# Patient Record
Sex: FEMALE | Race: BLACK OR AFRICAN AMERICAN | NOT HISPANIC OR LATINO | Employment: FULL TIME | URBAN - METROPOLITAN AREA
[De-identification: names, ages, dates, MRNs, and addresses within clinical notes are randomized per-mention and may not be internally consistent; named-entity substitution may affect disease eponyms.]

---

## 2018-08-13 ENCOUNTER — OFFICE VISIT (OUTPATIENT)
Dept: OBSTETRICS AND GYNECOLOGY | Facility: CLINIC | Age: 33
End: 2018-08-13
Payer: COMMERCIAL

## 2018-08-13 ENCOUNTER — PATIENT MESSAGE (OUTPATIENT)
Dept: OBSTETRICS AND GYNECOLOGY | Facility: CLINIC | Age: 33
End: 2018-08-13

## 2018-08-13 VITALS
SYSTOLIC BLOOD PRESSURE: 114 MMHG | WEIGHT: 149.94 LBS | BODY MASS INDEX: 25.6 KG/M2 | HEIGHT: 64 IN | DIASTOLIC BLOOD PRESSURE: 74 MMHG

## 2018-08-13 DIAGNOSIS — Z01.419 WELL WOMAN EXAM WITH ROUTINE GYNECOLOGICAL EXAM: Primary | ICD-10-CM

## 2018-08-13 DIAGNOSIS — Z30.431 INTRAUTERINE DEVICE SURVEILLANCE: ICD-10-CM

## 2018-08-13 DIAGNOSIS — N91.1 SECONDARY AMENORRHEA: ICD-10-CM

## 2018-08-13 DIAGNOSIS — N64.4 BILATERAL MASTODYNIA: ICD-10-CM

## 2018-08-13 PROCEDURE — 88175 CYTOPATH C/V AUTO FLUID REDO: CPT

## 2018-08-13 PROCEDURE — 99385 PREV VISIT NEW AGE 18-39: CPT | Mod: S$GLB,,, | Performed by: NURSE PRACTITIONER

## 2018-08-13 PROCEDURE — 99999 PR PBB SHADOW E&M-NEW PATIENT-LVL III: CPT | Mod: PBBFAC,,, | Performed by: NURSE PRACTITIONER

## 2018-08-13 NOTE — PROGRESS NOTES
HISTORY OF PRESENT ILLNESS:    Ganesh Schroeder is a 33 y.o. female, [id number]., No LMP recorded. Patient has had an implant.,  presents for a routine exam and c/o monthly breast pain.   -Denies bleeding or cramping with Mirena IUD, but has monthly breast pain.   -Denies lumps, nipple discharge, fever, trauma.   -Reports increased caffeine intake.     PAST HISTORY:  Past Medical History:   Diagnosis Date    Abnormal Pap smear of cervix        Past Surgical History:   Procedure Laterality Date     SECTION      x2    PELVIC LAPAROSCOPY      endometriosis    SALPINGECTOMY Right 2011    ectopic pregnancy    UMBILICAL HERNIA REPAIR         MEDICATIONS AND ALLERGIES:    Current Outpatient Medications:     levonorgestrel (MIRENA) 20 mcg/24 hr (5 years) IUD, 1 each by Intrauterine route once., Disp: , Rfl:     ibuprofen (ADVIL,MOTRIN) 600 MG tablet, Take 1 tablet by mouth Every 6 hours as needed., Disp: , Rfl:     Review of patient's allergies indicates:  No Known Allergies    Family History   Problem Relation Age of Onset    No Known Problems Paternal Grandfather     No Known Problems Paternal Grandmother     No Known Problems Maternal Grandmother     No Known Problems Maternal Grandfather     No Known Problems Father     No Known Problems Mother     No Known Problems Brother     No Known Problems Sister        Social History     Socioeconomic History    Marital status:      Spouse name: Not on file    Number of children: Not on file    Years of education: Not on file    Highest education level: Not on file   Social Needs    Financial resource strain: Not on file    Food insecurity - worry: Not on file    Food insecurity - inability: Not on file    Transportation needs - medical: Not on file    Transportation needs - non-medical: Not on file   Occupational History    Not on file   Tobacco Use    Smoking status: Never Smoker   Substance and Sexual Activity    Alcohol use: No      "Frequency: Never    Drug use: No    Sexual activity: Yes     Partners: Male     Birth control/protection: IUD   Other Topics Concern    Not on file   Social History Narrative    Not on file       OB HISTORY: Number of C/S: 2 Number of ectopic pregnancies:1    COMPREHENSIVE GYN HISTORY:  PAP History: Denies abnormal Paps. LAST PAP: 2010 NORMAL.  Infection History: Denies STDs. Denies PID.  Benign History: Denies uterine fibroids. Denies ovarian cysts. Denies endometriosis. Denies other conditions.  Cancer History: Denies cervical cancer. Denies uterine cancer or hyperplasia. Denies ovarian cancer. Denies vulvar cancer or pre-cancer. Denies vaginal cancer or pre-cancer. Denies breast cancer. Denies colon cancer.  Sexual Activity History: Reports currently being sexually active  Menstrual History: Denies menses.    Dysmenorrhea History: Denies dysmenorrhea.   Contraception: Mirena IUD inserted 2016 per pt "by Dr Veras".      ROS:  GENERAL: No weight changes. No swelling. No fatigue. No fever.  CARDIOVASCULAR: No chest pain. No shortness of breath. No leg cramps.   NEUROLOGICAL: No headaches. No vision changes.  BREASTS: + PAIN. No lumps. No discharge.  ABDOMEN: No pain. No nausea. No vomiting. No diarrhea. No constipation.  REPRODUCTIVE: No abnormal bleeding.   VULVA: No pain. No lesions. No itching.  VAGINA: No relaxation. No itching. No odor. No discharge. No lesions.  URINARY: No incontinence. No nocturia. No frequency. No dysuria.    /74   Ht 5' 4" (1.626 m)   Wt 68 kg (149 lb 14.6 oz)   BMI 25.73 kg/m²     PE:  APPEARANCE: Well nourished, well developed, in no acute distress.  AFFECT: WNL, alert and oriented x 3.  SKIN: No acne or hirsutism.  NECK: Neck symmetric, without masses or thyromegaly.  NODES: No inguinal, cervical, axillary or femoral lymph node enlargement.  CHEST: Good respiratory effort.   ABDOMEN: Soft. No tenderness or masses. UMBILICAL HERNIA has re-occurred. ASYMPTOMATIC.  BREASTS: " Symmetrical, no skin changes, visible lesions, palpable masses or nipple discharge bilaterally.  PELVIC: External female genitalia without lesions.  Female hair distribution. Adequate perineal body, Normal urethral meatus. Vagina moist and well rugated without lesions or discharge.  No significant cystocele or rectocele present. Cervix pink without lesions, discharge or tenderness. IUD STRINGS VISUALIZED AT OS. Uterus is 8 week size, regular, mobile and nontender. Adnexa without masses or tenderness.  EXTREMITIES: No edema    DIAGNOSIS:  1. Well woman exam with routine gynecological exam    2. Secondary amenorrhea due to IUD    3. Intrauterine device surveillance    4. Bilateral mastodynia        PLAN:    Orders Placed This Encounter    Liquid-based pap smear, screening   Declined STD testing    COUNSELING:  The patient was counseled today on:  -IUD danger signs;  -to decrease caffeine intake, wear good support bra, take OTC NSAIDs prn pain;  -A.C.S. Pap and pelvic exam guidelines (pap every 3 years), recomendations for yearly mammogram;  -to follow up with her PCP for other health maintenance.    FOLLOW-UP annually with Dr Veras.

## 2018-08-20 ENCOUNTER — OFFICE VISIT (OUTPATIENT)
Dept: INTERNAL MEDICINE | Facility: CLINIC | Age: 33
End: 2018-08-20
Payer: COMMERCIAL

## 2018-08-20 VITALS
HEIGHT: 64 IN | HEART RATE: 63 BPM | DIASTOLIC BLOOD PRESSURE: 70 MMHG | WEIGHT: 150 LBS | SYSTOLIC BLOOD PRESSURE: 124 MMHG | OXYGEN SATURATION: 97 % | BODY MASS INDEX: 25.61 KG/M2

## 2018-08-20 DIAGNOSIS — K42.9 UMBILICAL HERNIA WITHOUT OBSTRUCTION AND WITHOUT GANGRENE: ICD-10-CM

## 2018-08-20 DIAGNOSIS — Z00.00 HEALTH CARE MAINTENANCE: Primary | ICD-10-CM

## 2018-08-20 PROCEDURE — 99385 PREV VISIT NEW AGE 18-39: CPT | Mod: S$GLB,,, | Performed by: INTERNAL MEDICINE

## 2018-08-20 PROCEDURE — 99999 PR PBB SHADOW E&M-EST. PATIENT-LVL III: CPT | Mod: PBBFAC,,, | Performed by: INTERNAL MEDICINE

## 2018-08-20 NOTE — PROGRESS NOTES
"Subjective:       Patient ID: Ganesh Schroeder is a 33 y.o. female.    Chief Complaint: Establish Care (est care as a new patient.) and Umbilical Hernia (pt states that she had a hernia (mid abdominal/navel area) years ago and thinks that it may have returned. it causes inflammation, certain strain when bending over, growth in size but no SOB.)    HPI   Ganesh Schroeder is a 33 y.o. female here to establish care and have yearly preventative healthcare visit.     She has 2 children. Hadn't been keeping up with her own preventative visits.     She has hx of umbilical hernia which was repaired 5/6/16. Seems like it recurred for last 6 months. Feels strain and pulling jose guadalupe when holding 4 yo.  Reducible. Not painful.    Review of Systems   Constitutional: Negative for activity change and unexpected weight change.   HENT: Negative for hearing loss, rhinorrhea and trouble swallowing.    Eyes: Negative for discharge and visual disturbance.   Respiratory: Negative for chest tightness and wheezing.    Cardiovascular: Negative for chest pain and palpitations.   Gastrointestinal: Negative for blood in stool, constipation, diarrhea and vomiting.   Endocrine: Negative for polydipsia and polyuria.   Genitourinary: Negative for difficulty urinating, dysuria, hematuria and menstrual problem.   Musculoskeletal: Negative for arthralgias, joint swelling and neck pain.   Neurological: Positive for headaches. Negative for weakness.   Psychiatric/Behavioral: Negative for confusion and dysphoric mood.       Objective:   /70 (BP Location: Left arm, Patient Position: Sitting, BP Method: Medium (Manual))   Pulse 63   Ht 5' 4" (1.626 m)   Wt 68 kg (150 lb)   SpO2 97%   BMI 25.75 kg/m²      Physical Exam   Constitutional: She is oriented to person, place, and time. She appears well-developed and well-nourished.   HENT:   Head: Normocephalic and atraumatic.   Eyes: Conjunctivae and EOM are normal. Pupils are equal, round, and reactive to " light.   Neck: Neck supple. No thyromegaly present.   Cardiovascular: Normal rate, regular rhythm and normal heart sounds.   No murmur heard.  Pulmonary/Chest: Effort normal and breath sounds normal. No respiratory distress. She has no wheezes.   Abdominal: Soft. Bowel sounds are normal. She exhibits no distension. There is no tenderness. A hernia is present.   Soft reducible umbilical hernia   Musculoskeletal: Normal range of motion.   Neurological: She is alert and oriented to person, place, and time.   Skin: Skin is warm and dry. No rash noted.   Psychiatric: She has a normal mood and affect. Judgment and thought content normal.   Vitals reviewed.      Assessment:       1. Health care maintenance    2. Umbilical hernia without obstruction and without gangrene        Plan:       Ganesh was seen today for establish care and umbilical hernia.    Diagnoses and all orders for this visit:    Health care maintenance  -     CBC auto differential; Future  -     Hemoglobin A1c; Future  -     Comprehensive metabolic panel; Future  -     Lipid panel; Future  -     TSH; Future  -     Vitamin D; Future    Umbilical hernia without obstruction and without gangrene  Reviewed red flags, if these occur or becomes bothersome will refer to general surgery

## 2018-08-24 ENCOUNTER — LAB VISIT (OUTPATIENT)
Dept: LAB | Facility: HOSPITAL | Age: 33
End: 2018-08-24
Attending: INTERNAL MEDICINE
Payer: COMMERCIAL

## 2018-08-24 DIAGNOSIS — Z00.00 HEALTH CARE MAINTENANCE: ICD-10-CM

## 2018-08-24 LAB
25(OH)D3+25(OH)D2 SERPL-MCNC: 18 NG/ML
ALBUMIN SERPL BCP-MCNC: 3.8 G/DL
ALP SERPL-CCNC: 44 U/L
ALT SERPL W/O P-5'-P-CCNC: 11 U/L
ANION GAP SERPL CALC-SCNC: 6 MMOL/L
AST SERPL-CCNC: 16 U/L
BASOPHILS # BLD AUTO: 0.02 K/UL
BASOPHILS NFR BLD: 0.4 %
BILIRUB SERPL-MCNC: 0.5 MG/DL
BUN SERPL-MCNC: 9 MG/DL
CALCIUM SERPL-MCNC: 9.2 MG/DL
CHLORIDE SERPL-SCNC: 106 MMOL/L
CHOLEST SERPL-MCNC: 177 MG/DL
CHOLEST/HDLC SERPL: 2.7 {RATIO}
CO2 SERPL-SCNC: 25 MMOL/L
CREAT SERPL-MCNC: 1.1 MG/DL
DIFFERENTIAL METHOD: NORMAL
EOSINOPHIL # BLD AUTO: 0.1 K/UL
EOSINOPHIL NFR BLD: 2.8 %
ERYTHROCYTE [DISTWIDTH] IN BLOOD BY AUTOMATED COUNT: 13.1 %
EST. GFR  (AFRICAN AMERICAN): >60 ML/MIN/1.73 M^2
EST. GFR  (NON AFRICAN AMERICAN): >60 ML/MIN/1.73 M^2
ESTIMATED AVG GLUCOSE: 100 MG/DL
GLUCOSE SERPL-MCNC: 88 MG/DL
HBA1C MFR BLD HPLC: 5.1 %
HCT VFR BLD AUTO: 37 %
HDLC SERPL-MCNC: 66 MG/DL
HDLC SERPL: 37.3 %
HGB BLD-MCNC: 12.4 G/DL
LDLC SERPL CALC-MCNC: 98.8 MG/DL
LYMPHOCYTES # BLD AUTO: 1.4 K/UL
LYMPHOCYTES NFR BLD: 29.7 %
MCH RBC QN AUTO: 30.1 PG
MCHC RBC AUTO-ENTMCNC: 33.5 G/DL
MCV RBC AUTO: 90 FL
MONOCYTES # BLD AUTO: 0.4 K/UL
MONOCYTES NFR BLD: 8.1 %
NEUTROPHILS # BLD AUTO: 2.8 K/UL
NEUTROPHILS NFR BLD: 58.8 %
NONHDLC SERPL-MCNC: 111 MG/DL
PLATELET # BLD AUTO: 216 K/UL
PMV BLD AUTO: 9.7 FL
POTASSIUM SERPL-SCNC: 3.9 MMOL/L
PROT SERPL-MCNC: 7.1 G/DL
RBC # BLD AUTO: 4.12 M/UL
SODIUM SERPL-SCNC: 137 MMOL/L
TRIGL SERPL-MCNC: 61 MG/DL
TSH SERPL DL<=0.005 MIU/L-ACNC: 1.76 UIU/ML
WBC # BLD AUTO: 4.71 K/UL

## 2018-08-24 PROCEDURE — 85025 COMPLETE CBC W/AUTO DIFF WBC: CPT

## 2018-08-24 PROCEDURE — 83036 HEMOGLOBIN GLYCOSYLATED A1C: CPT

## 2018-08-24 PROCEDURE — 82306 VITAMIN D 25 HYDROXY: CPT

## 2018-08-24 PROCEDURE — 80061 LIPID PANEL: CPT

## 2018-08-24 PROCEDURE — 36415 COLL VENOUS BLD VENIPUNCTURE: CPT

## 2018-08-24 PROCEDURE — 84443 ASSAY THYROID STIM HORMONE: CPT

## 2018-08-24 PROCEDURE — 80053 COMPREHEN METABOLIC PANEL: CPT

## 2019-01-07 ENCOUNTER — PATIENT MESSAGE (OUTPATIENT)
Dept: INTERNAL MEDICINE | Facility: CLINIC | Age: 34
End: 2019-01-07

## 2019-01-07 DIAGNOSIS — K42.9 UMBILICAL HERNIA WITHOUT OBSTRUCTION AND WITHOUT GANGRENE: Primary | ICD-10-CM

## 2019-01-08 ENCOUNTER — PATIENT MESSAGE (OUTPATIENT)
Dept: INTERNAL MEDICINE | Facility: CLINIC | Age: 34
End: 2019-01-08

## 2019-01-23 ENCOUNTER — OFFICE VISIT (OUTPATIENT)
Dept: SURGERY | Facility: CLINIC | Age: 34
End: 2019-01-23
Payer: COMMERCIAL

## 2019-01-23 VITALS
HEIGHT: 64 IN | DIASTOLIC BLOOD PRESSURE: 90 MMHG | HEART RATE: 75 BPM | WEIGHT: 153 LBS | SYSTOLIC BLOOD PRESSURE: 120 MMHG | BODY MASS INDEX: 26.12 KG/M2

## 2019-01-23 DIAGNOSIS — K43.9 VENTRAL HERNIA WITHOUT OBSTRUCTION OR GANGRENE: Primary | ICD-10-CM

## 2019-01-23 DIAGNOSIS — Z01.818 PRE-OP TESTING: Primary | ICD-10-CM

## 2019-01-23 DIAGNOSIS — K43.2 INCISIONAL HERNIA, WITHOUT OBSTRUCTION OR GANGRENE: ICD-10-CM

## 2019-01-23 DIAGNOSIS — K43.2 RECURRENT VENTRAL INCISIONAL HERNIA: Primary | ICD-10-CM

## 2019-01-23 PROCEDURE — 99203 PR OFFICE/OUTPT VISIT, NEW, LEVL III, 30-44 MIN: ICD-10-PCS | Mod: S$GLB,,, | Performed by: SURGERY

## 2019-01-23 PROCEDURE — 99999 PR PBB SHADOW E&M-EST. PATIENT-LVL III: CPT | Mod: PBBFAC,,, | Performed by: SURGERY

## 2019-01-23 PROCEDURE — 3008F PR BODY MASS INDEX (BMI) DOCUMENTED: ICD-10-PCS | Mod: CPTII,S$GLB,, | Performed by: SURGERY

## 2019-01-23 PROCEDURE — 3008F BODY MASS INDEX DOCD: CPT | Mod: CPTII,S$GLB,, | Performed by: SURGERY

## 2019-01-23 PROCEDURE — 99999 PR PBB SHADOW E&M-EST. PATIENT-LVL III: ICD-10-PCS | Mod: PBBFAC,,, | Performed by: SURGERY

## 2019-01-23 PROCEDURE — 99203 OFFICE O/P NEW LOW 30 MIN: CPT | Mod: S$GLB,,, | Performed by: SURGERY

## 2019-01-23 RX ORDER — TOBRAMYCIN AND DEXAMETHASONE 3; 1 MG/ML; MG/ML
SUSPENSION/ DROPS OPHTHALMIC
Refills: 0 | COMMUNITY
Start: 2018-12-19 | End: 2019-09-09

## 2019-01-23 NOTE — PROGRESS NOTES
History & Physical    SUBJECTIVE:     History of Present Illness:  Patient is a 33 y.o. female who presents with a recurrent incisional ventral hernia. Her surgical history includes diagnostic laparoscopy and fulguration for endometriosis in  and laparoscopic unilateral salpingectomy for an ectopic pregnancy in , as well as  for the birth of her 2 children in  and . She developed a hernia during the pregnancy of her son in  and underwent ventral incisional hernia repair in May 2016; she is unsure of whether mesh was used or not. About a year ago she noticed recurrence however it has not been bothersome until more recently. Typically it is small and spontaneously reducible, however she has had a few episodes of increased pain up to 6-7/10 with a more prominent bulge that needs to be manually reduced. She denies any obstructive or strangulation symptoms.     Chief Complaint   Patient presents with    Hernia     Review of patient's allergies indicates:  No Known Allergies    Current Outpatient Medications   Medication Sig Dispense Refill    levonorgestrel (MIRENA) 20 mcg/24 hr (5 years) IUD 1 each by Intrauterine route once.      tobramycin-dexamethasone 0.3-0.1% (TOBRADEX) 0.3-0.1 % DrpS INT 1 GTT INTO OS QID  0     No current facility-administered medications for this visit.      Past Medical History:   Diagnosis Date    Abnormal Pap smear of cervix      Past Surgical History:   Procedure Laterality Date     SECTION  2010, 7/29/2014    x2    PELVIC LAPAROSCOPY      endometriosis    SALPINGECTOMY Right 2011    ectopic pregnancy    UMBILICAL HERNIA REPAIR  2016     Family History   Problem Relation Age of Onset    No Known Problems Paternal Grandfather     No Known Problems Paternal Grandmother     No Known Problems Maternal Grandmother     No Known Problems Maternal Grandfather     No Known Problems Father     No Known Problems Mother     No Known Problems  "Sister     No Known Problems Child     No Known Problems Child     Heart attack Neg Hx     Stroke Neg Hx     Diabetes Neg Hx     Cancer Neg Hx      Social History     Tobacco Use    Smoking status: Never Smoker    Smokeless tobacco: Never Used   Substance Use Topics    Alcohol use: No     Frequency: Never    Drug use: No      Review of Systems:  Review of Systems   Constitutional: Negative for activity change, appetite change, chills and fatigue.   HENT: Negative for congestion, sinus pain and sore throat.    Eyes: Negative.    Respiratory: Negative for shortness of breath and wheezing.    Cardiovascular: Negative for chest pain and palpitations.   Gastrointestinal: Positive for abdominal pain (at periumbilical hernia site). Negative for blood in stool, diarrhea and vomiting.   Endocrine: Negative.    Genitourinary: Negative.    Musculoskeletal: Negative.    Skin: Negative for rash and wound.   Allergic/Immunologic: Negative for environmental allergies, food allergies and immunocompromised state.   Neurological: Positive for headaches. Negative for dizziness and weakness.   Hematological: Negative.    Psychiatric/Behavioral: Negative.        OBJECTIVE:     Vital Signs (Most Recent)  Pulse: 75 (01/23/19 1326)  BP: (!) 120/90 (01/23/19 1326)  5' 4" (1.626 m)  69.4 kg (152 lb 16 oz)     Physical Exam:  Physical Exam   Constitutional: She is oriented to person, place, and time. She appears well-developed and well-nourished. No distress.   HENT:   Head: Normocephalic and atraumatic.   Eyes: Conjunctivae and EOM are normal. Pupils are equal, round, and reactive to light.   Neck: Normal range of motion. Neck supple.   Cardiovascular: Normal rate and regular rhythm.   Pulmonary/Chest: Effort normal. No respiratory distress.   Abdominal: Soft. She exhibits no mass. There is no tenderness. There is no rebound and no guarding. A hernia (small fat-containing supraumbilical ventral hernia) is present. "   Musculoskeletal: Normal range of motion. She exhibits no deformity.   Neurological: She is alert and oriented to person, place, and time.   Skin: Skin is warm and dry. No rash noted. No erythema.   Psychiatric: She has a normal mood and affect. Her behavior is normal.     Laboratory  CBC: Reviewed  CMP: Reviewed    ASSESSMENT/PLAN:   Mrs. Schroeder is a 33 year-old woman who presents with a small, reducible, minimally symptomatic recurrent ventral incisional hernia just above her umbilicus. Given that she is symptomatic, she would like repair. She has not had any symptoms of obstruction or strangulation. We discussed the risks, benefits and alternatives to hernia repair, the risks including but not limited to bleeding, infection, injury to intraabdominal organs such as bowel or bladder, and chronic pain, as well as recurrence. We reviewed the various approaches including open, laparoscopic or robotic. I would like to get her operative reports from Dr. Duran ([facility]? [facility]?) to see whether mesh was previously used or not. Given the size, if this was repair primarily, then I would approach this open with use of a mesh underlay. If mesh was used and may need to be excised to improve incorporation of new mesh, then a minimally invasive approach would be preferred. Given her post-op restrictions (no lifting, pushing or pulling >10lbs for 1 month post-op) she would like to hold off on surgery until early March. We will review her op note and discuss at that time. It was a pleasure meeting Mrs. Schroeder and thank you for this consultation.    April Burgess  1/23/2019

## 2019-01-23 NOTE — LETTER
January 23, 2019      Faviola Solis MD  [address]  [place] [address]           [facility] General Surgery  [address]  [place] [address]  Phone: [phone]          Patient: Ganesh Schroeder   MR Number: [id number]   YOB: 1985   Date of Visit: 1/23/2019       Dear Dr. Faviola Solis:    Thank you for referring Ganesh Schroeder to me for evaluation. Attached you will find relevant portions of my assessment and plan of care.    If you have questions, please do not hesitate to call me. I look forward to following Ganesh Schroeder along with you.    Sincerely,    April Burgess MD    Enclosure  CC:  No Recipients    If you would like to receive this communication electronically, please contact [email or web address] or [phone] to request more information on [email or web address] Link access.    For providers and/or their staff who would like to refer a patient to [facility], please contact us through our one-stop-shop provider referral line, [facility], at [phone].    If you feel you have received this communication in error or would no longer like to receive these types of communications, please e-mail [email or web address]

## 2019-01-23 NOTE — MEDICAL/APP STUDENT
Patient ID: Ganesh Schroeder is a 33 y.o. female.    Chief Complaint: No chief complaint on file.      HPI:  HPI    Pt has a PMHx of umbilical hernia repair (2016) about a year after birth of second child. Over the past 2 weeks she has been experience episodes of pain 3-4 times a week. Pt rates the intensity at 5 to 6 and describes it has a pressure type pain. The pain is the worst when the hernia obtrudes. She is able to push the hernia back in. Picking up her child is a trigger for the pain. Pertinent negatives include bowel changes, nausea or emesis. Pt erick chills, fever, chest pain, hematochezia, dysuria, and dizziness.     Pt has also been experiencing around 3 headaches a week. She notices they come on if she is the passenger in a car. Alive helps alleviate the pain.       Review of Systems   Constitutional: Negative for chills and fever.   Cardiovascular: Negative for chest pain.   Gastrointestinal: Positive for abdominal pain. Negative for blood in stool, constipation, diarrhea, nausea and vomiting.   Genitourinary: Negative for dysuria and frequency.   Skin: Negative for rash.   Neurological: Positive for headaches. Negative for dizziness.       Current Outpatient Medications   Medication Sig Dispense Refill    levonorgestrel (MIRENA) 20 mcg/24 hr (5 years) IUD 1 each by Intrauterine route once.      tobramycin-dexamethasone 0.3-0.1% (TOBRADEX) 0.3-0.1 % DrpS INT 1 GTT INTO OS QID  0     No current facility-administered medications for this visit.        Review of patient's allergies indicates:  No Known Allergies    Past Medical History:   Diagnosis Date    Abnormal Pap smear of cervix        Past Surgical History:   Procedure Laterality Date     SECTION  2010, 7/29/2014    x2    PELVIC LAPAROSCOPY      endometriosis    SALPINGECTOMY Right 2011    ectopic pregnancy    UMBILICAL HERNIA REPAIR  2016       Family History   Problem Relation Age of Onset    No Known Problems  Paternal Grandfather     No Known Problems Paternal Grandmother     No Known Problems Maternal Grandmother     No Known Problems Maternal Grandfather     No Known Problems Father     No Known Problems Mother     No Known Problems Sister     No Known Problems Child     No Known Problems Child     Heart attack Neg Hx     Stroke Neg Hx     Diabetes Neg Hx     Cancer Neg Hx        Social History     Socioeconomic History    Marital status:      Spouse name: Not on file    Number of children: Not on file    Years of education: Not on file    Highest education level: Not on file   Social Needs    Financial resource strain: Not on file    Food insecurity - worry: Not on file    Food insecurity - inability: Not on file    Transportation needs - medical: Not on file    Transportation needs - non-medical: Not on file   Occupational History    Not on file   Tobacco Use    Smoking status: Never Smoker    Smokeless tobacco: Never Used   Substance and Sexual Activity    Alcohol use: No     Frequency: Never    Drug use: No    Sexual activity: Yes     Partners: Male     Birth control/protection: IUD   Other Topics Concern    Not on file   Social History Narrative    Not on file       Vitals:    01/23/19 1326   BP: (!) 120/90   Pulse: 75       Physical Exam   Constitutional: No distress.   Cardiovascular: Normal heart sounds. Exam reveals no gallop and no friction rub.   No murmur heard.  Pulmonary/Chest: Effort normal and breath sounds normal. No stridor. No respiratory distress. She has no wheezes. She has no rales.   Abdominal: Soft. She exhibits no distension. There is no tenderness. A hernia (Umbilical hernia with small nodule of fat ) is present.       Assessment & Plan:   - obtain op notes from previous umbilical hernia repair to decide which approach to take for the hernia repair

## 2019-01-27 ENCOUNTER — PATIENT MESSAGE (OUTPATIENT)
Dept: ADMINISTRATIVE | Facility: OTHER | Age: 34
End: 2019-01-27

## 2019-02-21 ENCOUNTER — OFFICE VISIT (OUTPATIENT)
Dept: SURGERY | Facility: CLINIC | Age: 34
End: 2019-02-21
Payer: COMMERCIAL

## 2019-02-21 ENCOUNTER — DOCUMENTATION ONLY (OUTPATIENT)
Dept: CARDIOTHORACIC SURGERY | Facility: CLINIC | Age: 34
End: 2019-02-21

## 2019-02-21 VITALS
DIASTOLIC BLOOD PRESSURE: 72 MMHG | SYSTOLIC BLOOD PRESSURE: 118 MMHG | BODY MASS INDEX: 25.95 KG/M2 | WEIGHT: 152 LBS | HEIGHT: 64 IN | TEMPERATURE: 98 F | HEART RATE: 72 BPM

## 2019-02-21 DIAGNOSIS — K43.2 RECURRENT VENTRAL INCISIONAL HERNIA: Primary | ICD-10-CM

## 2019-02-21 PROCEDURE — 3008F PR BODY MASS INDEX (BMI) DOCUMENTED: ICD-10-PCS | Mod: CPTII,S$GLB,, | Performed by: SURGERY

## 2019-02-21 PROCEDURE — 3008F BODY MASS INDEX DOCD: CPT | Mod: CPTII,S$GLB,, | Performed by: SURGERY

## 2019-02-21 PROCEDURE — 99213 OFFICE O/P EST LOW 20 MIN: CPT | Mod: S$GLB,,, | Performed by: SURGERY

## 2019-02-21 PROCEDURE — 99999 PR PBB SHADOW E&M-EST. PATIENT-LVL III: ICD-10-PCS | Mod: PBBFAC,,, | Performed by: SURGERY

## 2019-02-21 PROCEDURE — 99213 PR OFFICE/OUTPT VISIT, EST, LEVL III, 20-29 MIN: ICD-10-PCS | Mod: S$GLB,,, | Performed by: SURGERY

## 2019-02-21 PROCEDURE — 99999 PR PBB SHADOW E&M-EST. PATIENT-LVL III: CPT | Mod: PBBFAC,,, | Performed by: SURGERY

## 2019-02-21 RX ORDER — PENICILLIN V POTASSIUM 500 MG/1
TABLET, FILM COATED ORAL
Refills: 0 | COMMUNITY
Start: 2019-01-31 | End: 2019-09-09

## 2019-02-21 NOTE — H&P (VIEW-ONLY)
History & Physical    SUBJECTIVE:     Chief Complaint: Recurrent ventral incisional hernia    History of Present Illness:  Ganesh Schroeder is a 33 y.o. female who presents with a recurrent incisional ventral hernia. Her surgical history includes diagnostic laparoscopy and fulguration for endometriosis in  and laparoscopic unilateral salpingectomy for an ectopic pregnancy in , as well as  for the birth of her 2 children in  and . She developed a hernia during the pregnancy of her son in  and underwent ventral incisional hernia repair in May 2016; she is unsure of whether mesh was used or not. About a year ago she noticed recurrence however it has not been bothersome until more recently. Typically it is small and spontaneously reducible, however she has had a few episodes of increased pain up to 6-/10 with a more prominent bulge that needs to be manually reduced. She denies any obstructive or strangulation symptoms.     Interval history:  -Outside operative report from [facility] from 16 repair of symptomatic umbilical hernia was uploaded. A 1.5 cm fascial defect was noted and closed with interrupted 0 Ethibond sutures in a Smead-Henao fashion. No mesh was used.  -No change in size of hernia. Patient is able to easily reduce hernia. No fevers, chills, N/V.  -She is interested in surgical repair    Review of patient's allergies indicates:  No Known Allergies    Past Medical History:   Diagnosis Date    Abnormal Pap smear of cervix      Past Surgical History:   Procedure Laterality Date     SECTION  2010, 7/29/2014    x2    PELVIC LAPAROSCOPY      endometriosis    SALPINGECTOMY Right 2011    ectopic pregnancy    UMBILICAL HERNIA REPAIR  2016     Family History   Problem Relation Age of Onset    No Known Problems Paternal Grandfather     No Known Problems Paternal Grandmother     No Known Problems Maternal Grandmother     No Known Problems Maternal Grandfather      "No Known Problems Father     No Known Problems Mother     No Known Problems Sister     No Known Problems Child     No Known Problems Child     Heart attack Neg Hx     Stroke Neg Hx     Diabetes Neg Hx     Cancer Neg Hx      Social History     Tobacco Use    Smoking status: Never Smoker    Smokeless tobacco: Never Used   Substance Use Topics    Alcohol use: No     Frequency: Never    Drug use: No        Review of Systems:  Review of Systems   Constitutional: Negative for chills and fever.   HENT: Negative.    Eyes: Negative.    Respiratory: Negative for chest tightness and shortness of breath.    Cardiovascular: Negative for chest pain and palpitations.   Gastrointestinal: Negative for abdominal pain, constipation, diarrhea, nausea and vomiting.   Endocrine: Negative.    Genitourinary: Negative for difficulty urinating and dysuria.   Musculoskeletal: Negative.    Skin: Negative.    Allergic/Immunologic: Negative.    Neurological: Positive for headaches. Negative for dizziness.   Hematological: Negative.    Psychiatric/Behavioral: Negative for agitation and confusion.       OBJECTIVE:     Vital Signs (Most Recent):  Temp: 98 °F (36.7 °C) (02/21/19 0928)  Pulse: 72 (02/21/19 0928)  BP: 118/72 (02/21/19 0928)    Estimated body mass index is 26.09 kg/m² as calculated from the following:    Height as of this encounter: 5' 4" (1.626 m).    Weight as of this encounter: 68.9 kg (152 lb).    Physical Exam:  Physical Exam   Constitutional: She is oriented to person, place, and time. She appears well-developed and well-nourished. No distress.   Pulmonary/Chest: Effort normal. No respiratory distress.   Abdominal: Soft. She exhibits no distension.   ~3x3 cm supraumbilical hernia, one fingerbreadth above navel   Neurological: She is alert and oriented to person, place, and time.   Skin: Skin is warm and dry.   Psychiatric: She has a normal mood and affect. Her behavior is normal. Judgment and thought content normal. "   Vitals reviewed.      Significant Labs:  CBC:  Lab Results   Component Value Date    WBC 4.71 08/24/2018    HGB 12.4 08/24/2018    HCT 37.0 08/24/2018    MCV 90 08/24/2018     08/24/2018       BMP:  Lab Results   Component Value Date     08/24/2018    K 3.9 08/24/2018     08/24/2018    CO2 25 08/24/2018    BUN 9 08/24/2018    CREATININE 1.1 08/24/2018    CALCIUM 9.2 08/24/2018    ANIONGAP 6 (L) 08/24/2018    ESTGFRAFRICA >60 08/24/2018    EGFRNONAA >60 08/24/2018       ASSESSMENT/PLAN:     -Will proceed with robotic ventral incisional hernia repair with mesh    -Consent in chart    BRUNO Smi MD  General Surgery, PGY-1  Pager: [phone]

## 2019-02-21 NOTE — PROGRESS NOTES
History & Physical    SUBJECTIVE:     Chief Complaint: Recurrent ventral incisional hernia    History of Present Illness:  Ganesh Schroeder is a 33 y.o. female who presents with a recurrent incisional ventral hernia. Her surgical history includes diagnostic laparoscopy and fulguration for endometriosis in  and laparoscopic unilateral salpingectomy for an ectopic pregnancy in , as well as  for the birth of her 2 children in  and . She developed a hernia during the pregnancy of her son in  and underwent ventral incisional hernia repair in May 2016; she is unsure of whether mesh was used or not. About a year ago she noticed recurrence however it has not been bothersome until more recently. Typically it is small and spontaneously reducible, however she has had a few episodes of increased pain up to 6-/10 with a more prominent bulge that needs to be manually reduced. She denies any obstructive or strangulation symptoms.     Interval history:  -Outside operative report from [facility] from 16 repair of symptomatic umbilical hernia was uploaded. A 1.5 cm fascial defect was noted and closed with interrupted 0 Ethibond sutures in a Smead-Henao fashion. No mesh was used.  -No change in size of hernia. Patient is able to easily reduce hernia. No fevers, chills, N/V.  -She is interested in surgical repair    Review of patient's allergies indicates:  No Known Allergies    Past Medical History:   Diagnosis Date    Abnormal Pap smear of cervix      Past Surgical History:   Procedure Laterality Date     SECTION  2010, 7/29/2014    x2    PELVIC LAPAROSCOPY      endometriosis    SALPINGECTOMY Right 2011    ectopic pregnancy    UMBILICAL HERNIA REPAIR  2016     Family History   Problem Relation Age of Onset    No Known Problems Paternal Grandfather     No Known Problems Paternal Grandmother     No Known Problems Maternal Grandmother     No Known Problems Maternal Grandfather      "No Known Problems Father     No Known Problems Mother     No Known Problems Sister     No Known Problems Child     No Known Problems Child     Heart attack Neg Hx     Stroke Neg Hx     Diabetes Neg Hx     Cancer Neg Hx      Social History     Tobacco Use    Smoking status: Never Smoker    Smokeless tobacco: Never Used   Substance Use Topics    Alcohol use: No     Frequency: Never    Drug use: No        Review of Systems:  Review of Systems   Constitutional: Negative for chills and fever.   HENT: Negative.    Eyes: Negative.    Respiratory: Negative for chest tightness and shortness of breath.    Cardiovascular: Negative for chest pain and palpitations.   Gastrointestinal: Negative for abdominal pain, constipation, diarrhea, nausea and vomiting.   Endocrine: Negative.    Genitourinary: Negative for difficulty urinating and dysuria.   Musculoskeletal: Negative.    Skin: Negative.    Allergic/Immunologic: Negative.    Neurological: Positive for headaches. Negative for dizziness.   Hematological: Negative.    Psychiatric/Behavioral: Negative for agitation and confusion.       OBJECTIVE:     Vital Signs (Most Recent):  Temp: 98 °F (36.7 °C) (02/21/19 0928)  Pulse: 72 (02/21/19 0928)  BP: 118/72 (02/21/19 0928)    Estimated body mass index is 26.09 kg/m² as calculated from the following:    Height as of this encounter: 5' 4" (1.626 m).    Weight as of this encounter: 68.9 kg (152 lb).    Physical Exam:  Physical Exam   Constitutional: She is oriented to person, place, and time. She appears well-developed and well-nourished. No distress.   Pulmonary/Chest: Effort normal. No respiratory distress.   Abdominal: Soft. She exhibits no distension.   ~3x3 cm supraumbilical hernia, one fingerbreadth above navel   Neurological: She is alert and oriented to person, place, and time.   Skin: Skin is warm and dry.   Psychiatric: She has a normal mood and affect. Her behavior is normal. Judgment and thought content normal. "   Vitals reviewed.      Significant Labs:  CBC:  Lab Results   Component Value Date    WBC 4.71 08/24/2018    HGB 12.4 08/24/2018    HCT 37.0 08/24/2018    MCV 90 08/24/2018     08/24/2018       BMP:  Lab Results   Component Value Date     08/24/2018    K 3.9 08/24/2018     08/24/2018    CO2 25 08/24/2018    BUN 9 08/24/2018    CREATININE 1.1 08/24/2018    CALCIUM 9.2 08/24/2018    ANIONGAP 6 (L) 08/24/2018    ESTGFRAFRICA >60 08/24/2018    EGFRNONAA >60 08/24/2018       ASSESSMENT/PLAN:     -Will proceed with robotic ventral incisional hernia repair with mesh    -Consent in chart    BRUNO Sim MD  General Surgery, PGY-1  Pager: [phone]

## 2019-02-21 NOTE — PROGRESS NOTES
PREPARING FOR SURGERY  Your surgery has been scheduled for:   Day: _Thursday_____ Date:  _3-7-19___  Arrival Time: TBD  Start Time: TBD  Informed pt that the surgery clinic will call her the day before surgery to notify her of the arrival time for surgery; pt verbalized understanding.  You should report to the second floor surgery center, located on the [facility] side of the second floor of the [facility]. The phone number is [phone].     PLEASE NOTE  · If you are allergic to any medications, please inform your doctor or the nurse responsible for your care.  · Tell the doctor if you take aspirin, products containing aspirin, herbal medications or blood thinners, such as Coumadin, Pradaxa, or Plavix.  · Notify your doctor if you are diabetic and provide information about the medications you take.  · Arrange for someone to drive you home following surgery. You will not be allowed to leave the surgical facility alone or drive yourself home following sedation and anesthesia.  · If you have not already done so, please bring a list of your medications with you the day of your surgery.     BEFORE SURGERY  Stop taking all herbal medications 14 days prior to surgery  Stop taking aspirin, products containing aspirin 0 days before surgery  Stop taking blood thinners 5 days before surgery  Refrain from drinking alcohol beverages for 24 hours before and after surgery  Stop or limit smoking 0 days before surgery  Other: ________________________________________________________     THE NIGHT BEFORE SURGERY  Eat a light supper on the night before your surgery, no greasy or fatty foods.  DO NOT EAT OR DRINK ANYTHING AFTER MIDNIGHT, INCLUDING GUM, HARD CANDY, MINTS, OR CHEWING TOBACCO  Take a complete shower. Wash your body from the neck down with Hibiclens (chlorhexidine gluconate) soap. Hibiclens soap may be purchased over the counter at the pharmacy. Keep the soap away from your eyes, ears, and mouth. After  "washing with Hibiclens, rinse thoroughly. You may also use any soap labeled "antibacterial". Shampoo your hair with your regular shampoo.     THE DAY OF SURGERY  Take another shower with Hibiclens or any antibacterial soap, to reduce the change of infection.  Medications to take the morning of surgery:_N/A__ with a small sip of water. Do not take diuretics or fluid pills.  Diabetic medication instructions will be given by the preop center. They will call you before your surgery.  You may brush your teeth and rinse your mouth, but do not shallow any water.  Do not apply perfume, powder, body lotions or deodorant on the day of surgery.  Do not wear any makeup, including mascara and false eyelashes.  Nail polish should be removed.  Wear comfortable clothes, such as button front shirt and loose-fitting pants.  Leave all jewelry, including body piercings and valuables at home.  Hairpins and clasps must be removed before you enter the operating room.  You may wear glasses, dentures and hearing aids before and after surgery. They may need to be removed before going into the operating room. Contact lenses worn before surgery must be removed before entering the operating room. Please bring a case for your hearing aids, glasses and/or contacts.  Bring any devices you will need after surgery such as crutches or canes.  If you have sleep apnea, please bring your CPAP machine.  If you have an implantable device, such as a pacemaker or AICD, please bring the device information card, if you have one.     If you have any questions or concerns, please don't hesitate to call.     Rocio Khoury RN  RN Clinician  Thoracic and Cardiovascular Surgery  [address] [place] [address] [id number] [phone] fax  "

## 2019-03-06 ENCOUNTER — TELEPHONE (OUTPATIENT)
Dept: SURGERY | Facility: CLINIC | Age: 34
End: 2019-03-06

## 2019-03-06 NOTE — TELEPHONE ENCOUNTER
Called pt and informed her of arrival time for surgery.  Pt instructed to report to [facility] at 10:00 tomorrow morning. Pt reminded to perform Hibiclens shower tonight and tomorrow morning, and to become NPO at 4:00am.  Pt verbalized understanding.

## 2019-03-07 ENCOUNTER — ANESTHESIA EVENT (OUTPATIENT)
Dept: SURGERY | Facility: HOSPITAL | Age: 34
End: 2019-03-07
Payer: COMMERCIAL

## 2019-03-07 ENCOUNTER — ANESTHESIA (OUTPATIENT)
Dept: SURGERY | Facility: HOSPITAL | Age: 34
End: 2019-03-07
Payer: COMMERCIAL

## 2019-03-07 ENCOUNTER — HOSPITAL ENCOUNTER (OUTPATIENT)
Facility: HOSPITAL | Age: 34
Discharge: HOME | End: 2019-03-08
Attending: SURGERY | Admitting: SURGERY
Payer: COMMERCIAL

## 2019-03-07 DIAGNOSIS — K43.2 VENTRAL INCISIONAL HERNIA: Primary | ICD-10-CM

## 2019-03-07 LAB
B-HCG UR QL: NEGATIVE
BASOPHILS # BLD AUTO: 0.01 K/UL
BASOPHILS NFR BLD: 0.1 %
CTP QC/QA: YES
DIFFERENTIAL METHOD: ABNORMAL
EOSINOPHIL # BLD AUTO: 0 K/UL
EOSINOPHIL NFR BLD: 0 %
ERYTHROCYTE [DISTWIDTH] IN BLOOD BY AUTOMATED COUNT: 13 %
HCT VFR BLD AUTO: 33.7 %
HGB BLD-MCNC: 11.4 G/DL
IMM GRANULOCYTES # BLD AUTO: 0.07 K/UL
IMM GRANULOCYTES NFR BLD AUTO: 0.5 %
LYMPHOCYTES # BLD AUTO: 0.5 K/UL
LYMPHOCYTES NFR BLD: 3.5 %
MCH RBC QN AUTO: 30.5 PG
MCHC RBC AUTO-ENTMCNC: 33.8 G/DL
MCV RBC AUTO: 90 FL
MONOCYTES # BLD AUTO: 0.1 K/UL
MONOCYTES NFR BLD: 0.5 %
NEUTROPHILS # BLD AUTO: 12.5 K/UL
NEUTROPHILS NFR BLD: 95.4 %
NRBC BLD-RTO: 0 /100 WBC
PLATELET # BLD AUTO: 207 K/UL
PMV BLD AUTO: 10.2 FL
RBC # BLD AUTO: 3.74 M/UL
WBC # BLD AUTO: 13.06 K/UL

## 2019-03-07 PROCEDURE — C1781 MESH (IMPLANTABLE): HCPCS | Performed by: SURGERY

## 2019-03-07 PROCEDURE — 49656 PR LAP, RECURRENT INCISIONAL HERNIA REPAIR,REDUCIBLE: CPT | Mod: ,,, | Performed by: SURGERY

## 2019-03-07 PROCEDURE — 71000039 HC RECOVERY, EACH ADD'L HOUR: Performed by: SURGERY

## 2019-03-07 PROCEDURE — 36415 COLL VENOUS BLD VENIPUNCTURE: CPT

## 2019-03-07 PROCEDURE — G0378 HOSPITAL OBSERVATION PER HR: HCPCS

## 2019-03-07 PROCEDURE — 85025 COMPLETE CBC W/AUTO DIFF WBC: CPT

## 2019-03-07 PROCEDURE — D9220A PRA ANESTHESIA: Mod: CRNA,,, | Performed by: NURSE ANESTHETIST, CERTIFIED REGISTERED

## 2019-03-07 PROCEDURE — 49656 PR LAP, RECURRENT INCISIONAL HERNIA REPAIR,REDUCIBLE: ICD-10-PCS | Mod: ,,, | Performed by: SURGERY

## 2019-03-07 PROCEDURE — 71000016 HC POSTOP RECOV ADDL HR: Performed by: SURGERY

## 2019-03-07 PROCEDURE — 63600175 PHARM REV CODE 636 W HCPCS: Performed by: NURSE ANESTHETIST, CERTIFIED REGISTERED

## 2019-03-07 PROCEDURE — 81025 URINE PREGNANCY TEST: CPT | Performed by: SURGERY

## 2019-03-07 PROCEDURE — 25000003 PHARM REV CODE 250: Performed by: SURGERY

## 2019-03-07 PROCEDURE — 25000003 PHARM REV CODE 250: Performed by: NURSE ANESTHETIST, CERTIFIED REGISTERED

## 2019-03-07 PROCEDURE — 27201423 OPTIME MED/SURG SUP & DEVICES STERILE SUPPLY: Performed by: SURGERY

## 2019-03-07 PROCEDURE — D9220A PRA ANESTHESIA: ICD-10-PCS | Mod: ANES,,, | Performed by: ANESTHESIOLOGY

## 2019-03-07 PROCEDURE — 25000003 PHARM REV CODE 250: Performed by: STUDENT IN AN ORGANIZED HEALTH CARE EDUCATION/TRAINING PROGRAM

## 2019-03-07 PROCEDURE — 27000221 HC OXYGEN, UP TO 24 HOURS

## 2019-03-07 PROCEDURE — D9220A PRA ANESTHESIA: ICD-10-PCS | Mod: CRNA,,, | Performed by: NURSE ANESTHETIST, CERTIFIED REGISTERED

## 2019-03-07 PROCEDURE — 36000711: Performed by: SURGERY

## 2019-03-07 PROCEDURE — D9220A PRA ANESTHESIA: Mod: ANES,,, | Performed by: ANESTHESIOLOGY

## 2019-03-07 PROCEDURE — 36000710: Performed by: SURGERY

## 2019-03-07 PROCEDURE — 37000008 HC ANESTHESIA 1ST 15 MINUTES: Performed by: SURGERY

## 2019-03-07 PROCEDURE — 71000033 HC RECOVERY, INTIAL HOUR: Performed by: SURGERY

## 2019-03-07 PROCEDURE — 63600175 PHARM REV CODE 636 W HCPCS

## 2019-03-07 PROCEDURE — 37000009 HC ANESTHESIA EA ADD 15 MINS: Performed by: SURGERY

## 2019-03-07 PROCEDURE — 94761 N-INVAS EAR/PLS OXIMETRY MLT: CPT

## 2019-03-07 PROCEDURE — 71000015 HC POSTOP RECOV 1ST HR: Performed by: SURGERY

## 2019-03-07 PROCEDURE — 63600175 PHARM REV CODE 636 W HCPCS: Performed by: STUDENT IN AN ORGANIZED HEALTH CARE EDUCATION/TRAINING PROGRAM

## 2019-03-07 DEVICE — MESH VENTRALIGHT ST 4.5IN CIR: Type: IMPLANTABLE DEVICE | Site: ABDOMEN | Status: FUNCTIONAL

## 2019-03-07 RX ORDER — MEPERIDINE HYDROCHLORIDE 50 MG/ML
12.5 INJECTION INTRAMUSCULAR; INTRAVENOUS; SUBCUTANEOUS ONCE
Status: COMPLETED | OUTPATIENT
Start: 2019-03-07 | End: 2019-03-07

## 2019-03-07 RX ORDER — LIDOCAINE HYDROCHLORIDE 10 MG/ML
1 INJECTION, SOLUTION EPIDURAL; INFILTRATION; INTRACAUDAL; PERINEURAL ONCE
Status: COMPLETED | OUTPATIENT
Start: 2019-03-07 | End: 2019-03-07

## 2019-03-07 RX ORDER — LIDOCAINE HCL/PF 100 MG/5ML
SYRINGE (ML) INTRAVENOUS
Status: DISCONTINUED | OUTPATIENT
Start: 2019-03-07 | End: 2019-03-07

## 2019-03-07 RX ORDER — BUPIVACAINE HYDROCHLORIDE 2.5 MG/ML
INJECTION, SOLUTION EPIDURAL; INFILTRATION; INTRACAUDAL
Status: DISCONTINUED | OUTPATIENT
Start: 2019-03-07 | End: 2019-03-07 | Stop reason: HOSPADM

## 2019-03-07 RX ORDER — OXYCODONE AND ACETAMINOPHEN 10; 325 MG/1; MG/1
1 TABLET ORAL EVERY 4 HOURS PRN
Status: DISCONTINUED | OUTPATIENT
Start: 2019-03-07 | End: 2019-03-08 | Stop reason: HOSPADM

## 2019-03-07 RX ORDER — DEXAMETHASONE SODIUM PHOSPHATE 4 MG/ML
INJECTION, SOLUTION INTRA-ARTICULAR; INTRALESIONAL; INTRAMUSCULAR; INTRAVENOUS; SOFT TISSUE
Status: DISCONTINUED | OUTPATIENT
Start: 2019-03-07 | End: 2019-03-07

## 2019-03-07 RX ORDER — MIDAZOLAM HYDROCHLORIDE 1 MG/ML
INJECTION, SOLUTION INTRAMUSCULAR; INTRAVENOUS
Status: DISCONTINUED | OUTPATIENT
Start: 2019-03-07 | End: 2019-03-07

## 2019-03-07 RX ORDER — SODIUM CHLORIDE 0.9 % (FLUSH) 0.9 %
3 SYRINGE (ML) INJECTION
Status: DISCONTINUED | OUTPATIENT
Start: 2019-03-07 | End: 2019-03-08 | Stop reason: HOSPADM

## 2019-03-07 RX ORDER — OXYCODONE AND ACETAMINOPHEN 5; 325 MG/1; MG/1
1-2 TABLET ORAL EVERY 4 HOURS PRN
Qty: 60 TABLET | Refills: 0 | Status: SHIPPED | OUTPATIENT
Start: 2019-03-07 | End: 2019-09-09

## 2019-03-07 RX ORDER — ACETAMINOPHEN 10 MG/ML
INJECTION, SOLUTION INTRAVENOUS
Status: DISCONTINUED | OUTPATIENT
Start: 2019-03-07 | End: 2019-03-07

## 2019-03-07 RX ORDER — SODIUM CHLORIDE 9 MG/ML
INJECTION, SOLUTION INTRAVENOUS CONTINUOUS
Status: DISCONTINUED | OUTPATIENT
Start: 2019-03-07 | End: 2019-03-08 | Stop reason: HOSPADM

## 2019-03-07 RX ORDER — PHENYLEPHRINE HYDROCHLORIDE 10 MG/ML
INJECTION INTRAVENOUS
Status: DISCONTINUED | OUTPATIENT
Start: 2019-03-07 | End: 2019-03-07

## 2019-03-07 RX ORDER — MEPERIDINE HYDROCHLORIDE 50 MG/ML
INJECTION INTRAMUSCULAR; INTRAVENOUS; SUBCUTANEOUS
Status: COMPLETED
Start: 2019-03-07 | End: 2019-03-07

## 2019-03-07 RX ORDER — ROCURONIUM BROMIDE 10 MG/ML
INJECTION, SOLUTION INTRAVENOUS
Status: DISCONTINUED | OUTPATIENT
Start: 2019-03-07 | End: 2019-03-07

## 2019-03-07 RX ORDER — CEFAZOLIN SODIUM 1 G/3ML
2 INJECTION, POWDER, FOR SOLUTION INTRAMUSCULAR; INTRAVENOUS
Status: COMPLETED | OUTPATIENT
Start: 2019-03-07 | End: 2019-03-07

## 2019-03-07 RX ORDER — POLYETHYLENE GLYCOL 3350 17 G/17G
17 POWDER, FOR SOLUTION ORAL 2 TIMES DAILY PRN
Refills: 0 | COMMUNITY
Start: 2019-03-07 | End: 2019-09-09

## 2019-03-07 RX ORDER — GLYCOPYRROLATE 0.2 MG/ML
INJECTION INTRAMUSCULAR; INTRAVENOUS
Status: DISCONTINUED | OUTPATIENT
Start: 2019-03-07 | End: 2019-03-07

## 2019-03-07 RX ORDER — HYDROMORPHONE HYDROCHLORIDE 1 MG/ML
0.2 INJECTION, SOLUTION INTRAMUSCULAR; INTRAVENOUS; SUBCUTANEOUS EVERY 5 MIN PRN
Status: DISCONTINUED | OUTPATIENT
Start: 2019-03-07 | End: 2019-03-07 | Stop reason: HOSPADM

## 2019-03-07 RX ORDER — ONDANSETRON 2 MG/ML
INJECTION INTRAMUSCULAR; INTRAVENOUS
Status: DISCONTINUED | OUTPATIENT
Start: 2019-03-07 | End: 2019-03-07

## 2019-03-07 RX ORDER — LORAZEPAM 2 MG/ML
0.25 INJECTION INTRAMUSCULAR ONCE AS NEEDED
Status: DISCONTINUED | OUTPATIENT
Start: 2019-03-07 | End: 2019-03-07 | Stop reason: HOSPADM

## 2019-03-07 RX ORDER — FENTANYL CITRATE 50 UG/ML
INJECTION, SOLUTION INTRAMUSCULAR; INTRAVENOUS
Status: DISCONTINUED | OUTPATIENT
Start: 2019-03-07 | End: 2019-03-07

## 2019-03-07 RX ORDER — ONDANSETRON 2 MG/ML
4 INJECTION INTRAMUSCULAR; INTRAVENOUS EVERY 6 HOURS PRN
Status: DISCONTINUED | OUTPATIENT
Start: 2019-03-07 | End: 2019-03-08 | Stop reason: HOSPADM

## 2019-03-07 RX ORDER — PROPOFOL 10 MG/ML
VIAL (ML) INTRAVENOUS
Status: DISCONTINUED | OUTPATIENT
Start: 2019-03-07 | End: 2019-03-07

## 2019-03-07 RX ORDER — NEOSTIGMINE METHYLSULFATE 1 MG/ML
INJECTION, SOLUTION INTRAVENOUS
Status: DISCONTINUED | OUTPATIENT
Start: 2019-03-07 | End: 2019-03-07

## 2019-03-07 RX ORDER — KETAMINE HCL IN 0.9 % NACL 50 MG/5 ML
SYRINGE (ML) INTRAVENOUS
Status: DISCONTINUED | OUTPATIENT
Start: 2019-03-07 | End: 2019-03-07

## 2019-03-07 RX ADMIN — PROPOFOL 150 MG: 10 INJECTION, EMULSION INTRAVENOUS at 12:03

## 2019-03-07 RX ADMIN — MEPERIDINE HYDROCHLORIDE 12.5 MG: 50 INJECTION INTRAMUSCULAR; INTRAVENOUS; SUBCUTANEOUS at 04:03

## 2019-03-07 RX ADMIN — MIDAZOLAM HYDROCHLORIDE 2 MG: 1 INJECTION, SOLUTION INTRAMUSCULAR; INTRAVENOUS at 11:03

## 2019-03-07 RX ADMIN — CEFAZOLIN 2 G: 330 INJECTION, POWDER, FOR SOLUTION INTRAMUSCULAR; INTRAVENOUS at 12:03

## 2019-03-07 RX ADMIN — OXYCODONE HYDROCHLORIDE AND ACETAMINOPHEN 1 TABLET: 10; 325 TABLET ORAL at 09:03

## 2019-03-07 RX ADMIN — Medication 20 MG: at 12:03

## 2019-03-07 RX ADMIN — DEXAMETHASONE SODIUM PHOSPHATE 8 MG: 4 INJECTION, SOLUTION INTRAMUSCULAR; INTRAVENOUS at 12:03

## 2019-03-07 RX ADMIN — PROPOFOL 50 MG: 10 INJECTION, EMULSION INTRAVENOUS at 12:03

## 2019-03-07 RX ADMIN — PHENYLEPHRINE HYDROCHLORIDE 100 MCG: 10 INJECTION INTRAVENOUS at 01:03

## 2019-03-07 RX ADMIN — PHENYLEPHRINE HYDROCHLORIDE 100 MCG: 10 INJECTION INTRAVENOUS at 02:03

## 2019-03-07 RX ADMIN — SODIUM CHLORIDE, SODIUM GLUCONATE, SODIUM ACETATE, POTASSIUM CHLORIDE, MAGNESIUM CHLORIDE, SODIUM PHOSPHATE, DIBASIC, AND POTASSIUM PHOSPHATE: .53; .5; .37; .037; .03; .012; .00082 INJECTION, SOLUTION INTRAVENOUS at 12:03

## 2019-03-07 RX ADMIN — SODIUM CHLORIDE: 0.9 INJECTION, SOLUTION INTRAVENOUS at 10:03

## 2019-03-07 RX ADMIN — FENTANYL CITRATE 50 MCG: 50 INJECTION, SOLUTION INTRAMUSCULAR; INTRAVENOUS at 12:03

## 2019-03-07 RX ADMIN — LIDOCAINE HYDROCHLORIDE 75 MG: 20 INJECTION, SOLUTION INTRAVENOUS at 12:03

## 2019-03-07 RX ADMIN — GLYCOPYRROLATE 0.4 MG: 0.2 INJECTION, SOLUTION INTRAMUSCULAR; INTRAVENOUS at 02:03

## 2019-03-07 RX ADMIN — Medication 25 MG: at 12:03

## 2019-03-07 RX ADMIN — ACETAMINOPHEN 1000 MG: 10 INJECTION, SOLUTION INTRAVENOUS at 02:03

## 2019-03-07 RX ADMIN — LIDOCAINE HYDROCHLORIDE 10 MG: 10 INJECTION, SOLUTION EPIDURAL; INFILTRATION; INTRACAUDAL; PERINEURAL at 10:03

## 2019-03-07 RX ADMIN — FENTANYL CITRATE 25 MCG: 50 INJECTION, SOLUTION INTRAMUSCULAR; INTRAVENOUS at 02:03

## 2019-03-07 RX ADMIN — PHENYLEPHRINE HYDROCHLORIDE 100 MCG: 10 INJECTION INTRAVENOUS at 12:03

## 2019-03-07 RX ADMIN — ONDANSETRON 4 MG: 2 INJECTION INTRAMUSCULAR; INTRAVENOUS at 02:03

## 2019-03-07 RX ADMIN — ROCURONIUM BROMIDE 50 MG: 10 INJECTION, SOLUTION INTRAVENOUS at 12:03

## 2019-03-07 RX ADMIN — NEOSTIGMINE METHYLSULFATE 3 MG: 1 INJECTION INTRAVENOUS at 02:03

## 2019-03-07 RX ADMIN — OXYCODONE HYDROCHLORIDE AND ACETAMINOPHEN 1 TABLET: 10; 325 TABLET ORAL at 04:03

## 2019-03-07 RX ADMIN — FENTANYL CITRATE 100 MCG: 50 INJECTION, SOLUTION INTRAMUSCULAR; INTRAVENOUS at 12:03

## 2019-03-07 NOTE — INTERVAL H&P NOTE
The patient has been examined and the H&P has been reviewed:    I concur with the findings and no changes have occurred since H&P was written.    Anesthesia/Surgery risks, benefits and alternative options discussed and understood by patient/family.          Active Hospital Problems    Diagnosis  POA    Ventral incisional hernia [K43.2]  Yes      Resolved Hospital Problems   No resolved problems to display.

## 2019-03-07 NOTE — ANESTHESIA PREPROCEDURE EVALUATION
03/07/2019  Ganesh Schroeder is a 33 y.o., female.    Anesthesia Evaluation    I have reviewed the Patient Summary Reports.        Review of Systems  Anesthesia Hx:  No problems with previous Anesthesia    Social:  Non-Smoker    Hematology/Oncology:  Hematology Normal   Oncology Normal     EENT/Dental:EENT/Dental Normal   Cardiovascular:  Cardiovascular Normal     Pulmonary:  Pulmonary Normal    Renal/:  Renal/ Normal     Hepatic/GI:  Hepatic/GI Normal    Musculoskeletal:  Musculoskeletal Normal    Neurological:  Neurology Normal    Endocrine:  Endocrine Normal    Dermatological:  Skin Normal    Psych:  Psychiatric Normal           Physical Exam  General:  Well nourished    Airway/Jaw/Neck:  Airway Findings: Mouth Opening: Normal Tongue: Normal  General Airway Assessment: Adult  Mallampati: II  Improves to II with phonation.  TM Distance: Normal, at least 6 cm  Jaw/Neck Findings:  Neck ROM: Normal ROM      Dental:  Dental Findings: In tact   Chest/Lungs:  Chest/Lungs Findings: Clear to auscultation, Normal Respiratory Rate     Heart/Vascular:  Heart Findings: Rate: Normal  Rhythm: Regular Rhythm  Sounds: Normal             Anesthesia Plan  Type of Anesthesia, risks & benefits discussed:  Anesthesia Type:  general  Patient's Preference: General  Intra-op Monitoring Plan:   Intra-op Monitoring Plan Comments:   Post Op Pain Control Plan:   Post Op Pain Control Plan Comments:   Induction:   IV  Beta Blocker:  Patient is not currently on a Beta-Blocker (No further documentation required).       Informed Consent: Patient understands risks and agrees with Anesthesia plan.  Questions answered. Anesthesia consent signed with patient.  ASA Score: 2     Day of Surgery Review of History & Physical: I have interviewed and examined the patient. I have reviewed the patient's H&P dated:  There are no significant changes.           Ready For Surgery From Anesthesia Perspective.

## 2019-03-07 NOTE — TRANSFER OF CARE
"Anesthesia Transfer of Care Note    Patient: Ganesh Schroeder    Procedure(s) Performed: Procedure(s) (LRB):  XI ROBOTIC REPAIR, HERNIA, VENTRAL WITH MESH (N/A)    Patient location: PACU    Anesthesia Type: general    Transport from OR: Transported from OR on 6-10 L/min O2 by face mask with adequate spontaneous ventilation    Post pain: adequate analgesia    Post assessment: no apparent anesthetic complications and tolerated procedure well    Post vital signs: stable    Level of consciousness: sedated and responds to stimulation    Nausea/Vomiting: no nausea/vomiting    Complications: none    Transfer of care protocol was followed      Last vitals:   Visit Vitals  BP (!) 98/58 (BP Location: Left arm, Patient Position: Lying)   Pulse 75   Temp 36.2 °C (97.2 °F) (Temporal)   Resp 14   Ht 5' 4" (1.626 m)   Wt 68 kg (150 lb)   LMP  (Within Years)   SpO2 100%   Breastfeeding? No   BMI 25.75 kg/m²     "

## 2019-03-07 NOTE — BRIEF OP NOTE
[facility]  Brief Operative Note     SUMMARY     Surgery Date: 3/7/2019     Surgeon(s) and Role:     * April Burgess MD - Primary     * SUNSHINE Celestin Jr., MD - Resident - Assisting    Pre-op Diagnosis:  Ventral hernia without obstruction or gangrene [K43.9]  Incisional hernia, without obstruction or gangrene [K43.2]    Post-op Diagnosis:  Post-Op Diagnosis Codes:     * Ventral hernia without obstruction or gangrene [K43.9]     * Incisional hernia, without obstruction or gangrene [K43.2]    Procedure(s) (LRB):  XI ROBOTIC REPAIR, HERNIA, VENTRAL WITH MESH (N/A)    Anesthesia: General    Findings/Key Components: 2 x 2 cm incisional ventral hernia containing fat.  Surrounding fascia was lax.  Mesh covered this area as well.  Robotic assisted repair.    Estimated Blood Loss: 10 cc         Specimens:   Specimen (12h ago, onward)    None          Discharge Note    SUMMARY     Admit Date: 3/7/2019    Discharge Date and Time:  03/07/2019 3:15 PM    Hospital Course (synopsis of major diagnoses, care, treatment, and services provided during the course of the hospital stay): Patient kept overnight for observation post operatively due to tachycardia and poor pain control. Overnight her pain significantly improved and her tachycardia resolved. Upon discharge she was ambulating without assistance, able to void and tolerating a regular diet without n/v.     PE: RRR  No respiratory distress; normal effort  Abd soft, non-distended with appropriate incisional tenderness  -Incisions c/d/i with dermabond in place    Final Diagnosis: Post-Op Diagnosis Codes:     * Ventral hernia without obstruction or gangrene [K43.9]     * Incisional hernia, without obstruction or gangrene [K43.2]    Disposition: Home or Self Care    Follow Up/Patient Instructions:     Medications:  Reconciled Home Medications:      Medication List      START taking these medications    oxyCODONE-acetaminophen 5-325 mg per  tablet  Commonly known as:  PERCOCET  Take 1-2 tablets by mouth every 4 (four) hours as needed for Pain.     polyethylene glycol 17 gram/dose powder  Commonly known as:  GLYCOLAX  Take 17 g by mouth 2 (two) times daily as needed (Constipation).        CONTINUE taking these medications    levonorgestrel 20 mcg/24 hr (5 years) IUD  Commonly known as:  MIRENA  1 each by Intrauterine route once.     penicillin v potassium 500 MG tablet  Commonly known as:  VEETID  TK 1 T PO BID     tobramycin-dexamethasone 0.3-0.1% 0.3-0.1 % Drps  Commonly known as:  TOBRADEX  INT 1 GTT INTO OS QID          Discharge Procedure Orders   Lifting restrictions   Order Comments: Do not lift anything greater than 10-15 lbs for 6 weeks     Notify your health care provider if you experience any of the following:  increased confusion or weakness     Notify your health care provider if you experience any of the following:  persistent dizziness, light-headedness, or visual disturbances     Notify your health care provider if you experience any of the following:  worsening rash     Notify your health care provider if you experience any of the following:  severe persistent headache     Notify your health care provider if you experience any of the following:  difficulty breathing or increased cough     Notify your health care provider if you experience any of the following:  redness, tenderness, or signs of infection (pain, swelling, redness, odor or green/yellow discharge around incision site)     Notify your health care provider if you experience any of the following:  severe uncontrolled pain     Notify your health care provider if you experience any of the following:  persistent nausea and vomiting or diarrhea     Notify your health care provider if you experience any of the following:  temperature >100.4     No dressing needed     Activity as tolerated   Order Comments: May shower tomorrow.  Wash gently with warm soap and water.  Do not bathe or  soak the incision for two weeks.  Dermabond (surgical glue) will peel off on its own after a couple of weeks.    You may need an over the counter stool softener, such as Miralax, for constipation caused by the pain medication.  We recommend titrating this to 1-2 BM every 1-2 days.     Follow-up Information     April Burgess MD In 2 weeks.    Specialties:  Bariatrics, General Surgery  Why:  For wound re-check  Contact information:  [address]  [facility] [id number] [phone]

## 2019-03-07 NOTE — OP NOTE
DATE OF PROCEDURE: 3/7/2019    PRE OP DIAGNOSIS: Recurrent ventral hernia without obstruction or gangrene [K43.9]  Incisional hernia, without obstruction or gangrene [K43.2]    POST OP DIAGNOSIS: Recurrent ventral hernia without obstruction or gangrene [K43.9]  Incisional hernia, without obstruction or gangrene [K43.2]    PROCEDURE: Procedure(s) (LRB):  XI ROBOTIC REPAIR, HERNIA, RECURRENT VENTRAL INCISIONAL WITH MESH (N/A)    Surgeon(s) and Role:     * April Burgess MD - Primary     * SUNSHINE Celestin Jr., MD - Resident - Assisting    ANESTHESIA: General.     FINDINGS:  1. Primary recurrent hernia defect measuring 2 x 1 cm with moderate amount of herniated pre-peritoneal fat. Small recurrent incisional hernia just inferior to this containing a small amount of herniated pre-peritoneal fat. Multiple areas of weakness and small fascial defects along prior incisional hernia repair.   2. Placement of a 11.4 x 11.4 cm Ventralight ST round mesh in an IPOM fashion to cover entirety of above defects.    INDICATION:  Patient is a 33 year-old woman who presents with a recurrent incisional ventral hernia. Her surgical history includes diagnostic laparoscopy and fulguration for endometriosis in  and laparoscopic unilateral salpingectomy for an ectopic pregnancy in , as well as  for the birth of her 2 children in  and . She developed an incisional hernia during the pregnancy of her son in  and underwent primary ventral incisional hernia repair in May 2016. Her hernia is small and reducible but symptomatic. After discussing the risks, benefits and alternatives to repair, as well as the various approaches including open, laparoscopic and robotic, she elected to proceed with robotic recurrent ventral incisional hernia repair with mesh.     DESCRIPTION OF OPERATION: The patient was met in the pre-op area and her identity and consent confirmed. She was then brought to the Operating Room and placed  on the table in the supine position. General anesthesia was induced and she was intubated without incident. SCDs and a warming blanket were placed, and 2g cefazolin were infused within 30 minutes of the incision. Her arms were padded and tucked and his abdomen prepped and draped in sterile fashion. A pre-procedural pause was performed by all members of the surgical and anesthesia teams. Access to the abdomen was gained using a Veress needle at Ugalde's point (left subcostal) on the first pass and insufflated to 15 mmHg. The Veress was then removed and the abdomen entered with a 8-mm Optiview trocar under direct visualization. Inspection of the abdomen revealed no injuries. Two additional 8-mm trocars were then placed laterally and in the LLQ under vision. The [employer] Xi robot was then docked and the 30-degree scope placed through the middle port. The remainder of the instruments were placed under visualization. At this point I scrubbed out and moved to the surgeon's console. A pre-peritoneal flap was made and dissection continued to the site of the prior hernia repair through which a moderate amount of pre-peritoneal fat had herniated. This was reduced in its entirety and a smaller hernia just inferior was noted and the contents also reduced. The peritoneal sac was excised and enough preperitoneal fat and falciform ligament was dissected off of the anterior abdominal wall to allow for appropriate closure of the hernia defect. The defect was then closed with 0 Stratafix in a running fashion. An 11.4 x 11.4 cm Ventralight ST mesh was then sutured to the abdominal wall with two 2-0 V-cecille sutures in a running u-stitch fashion circumferentially at its edge with the coated surface facing the bowel intraperitoneally. It laid nice and flat without tension or wrinkles. The sutures and previously removed peritoneal sac were then removed from the abdomen. The robot was then undocked and the abdomen desufflated. The skin  incisions were closed with 4-0 Monocryl in a running subcuticular fashion and reinforced with Liquiband. The patient was awoken from anesthesia and extubated without complication. He was brought to the PACU in good condition having tolerated the operation well.     EBL: 5 mL  Specimens: None  Complications: None apparent  Drains: None  Disposition: Home following PACU     Surgical instrument, sponge and needle count was correct.  I was present (and scrubbed when appropriate) for the entirety of this case.     April Burgess  3/7/2019

## 2019-03-08 VITALS
HEART RATE: 66 BPM | RESPIRATION RATE: 17 BRPM | SYSTOLIC BLOOD PRESSURE: 97 MMHG | TEMPERATURE: 97 F | WEIGHT: 150 LBS | HEIGHT: 64 IN | OXYGEN SATURATION: 97 % | BODY MASS INDEX: 25.61 KG/M2 | DIASTOLIC BLOOD PRESSURE: 50 MMHG

## 2019-03-08 LAB
ANION GAP SERPL CALC-SCNC: 7 MMOL/L
BASOPHILS # BLD AUTO: 0.01 K/UL
BASOPHILS NFR BLD: 0.1 %
BUN SERPL-MCNC: 8 MG/DL
CALCIUM SERPL-MCNC: 8.4 MG/DL
CHLORIDE SERPL-SCNC: 105 MMOL/L
CO2 SERPL-SCNC: 22 MMOL/L
CREAT SERPL-MCNC: 0.8 MG/DL
DIFFERENTIAL METHOD: ABNORMAL
EOSINOPHIL # BLD AUTO: 0 K/UL
EOSINOPHIL NFR BLD: 0 %
ERYTHROCYTE [DISTWIDTH] IN BLOOD BY AUTOMATED COUNT: 13.2 %
EST. GFR  (AFRICAN AMERICAN): >60 ML/MIN/1.73 M^2
EST. GFR  (NON AFRICAN AMERICAN): >60 ML/MIN/1.73 M^2
GLUCOSE SERPL-MCNC: 94 MG/DL
HCT VFR BLD AUTO: 31.8 %
HGB BLD-MCNC: 10.7 G/DL
IMM GRANULOCYTES # BLD AUTO: 0.05 K/UL
IMM GRANULOCYTES NFR BLD AUTO: 0.5 %
LYMPHOCYTES # BLD AUTO: 0.9 K/UL
LYMPHOCYTES NFR BLD: 8 %
MCH RBC QN AUTO: 30.2 PG
MCHC RBC AUTO-ENTMCNC: 33.6 G/DL
MCV RBC AUTO: 90 FL
MONOCYTES # BLD AUTO: 0.7 K/UL
MONOCYTES NFR BLD: 5.9 %
NEUTROPHILS # BLD AUTO: 9.5 K/UL
NEUTROPHILS NFR BLD: 85.5 %
NRBC BLD-RTO: 0 /100 WBC
PLATELET # BLD AUTO: 206 K/UL
PMV BLD AUTO: 10.5 FL
POTASSIUM SERPL-SCNC: 3.9 MMOL/L
RBC # BLD AUTO: 3.54 M/UL
SODIUM SERPL-SCNC: 134 MMOL/L
WBC # BLD AUTO: 11.08 K/UL

## 2019-03-08 PROCEDURE — 80048 BASIC METABOLIC PNL TOTAL CA: CPT

## 2019-03-08 PROCEDURE — G0378 HOSPITAL OBSERVATION PER HR: HCPCS

## 2019-03-08 PROCEDURE — 25000003 PHARM REV CODE 250: Performed by: SURGERY

## 2019-03-08 PROCEDURE — 85025 COMPLETE CBC W/AUTO DIFF WBC: CPT

## 2019-03-08 PROCEDURE — 36415 COLL VENOUS BLD VENIPUNCTURE: CPT

## 2019-03-08 RX ADMIN — OXYCODONE HYDROCHLORIDE AND ACETAMINOPHEN 1 TABLET: 10; 325 TABLET ORAL at 08:03

## 2019-03-08 RX ADMIN — OXYCODONE HYDROCHLORIDE AND ACETAMINOPHEN 1 TABLET: 10; 325 TABLET ORAL at 02:03

## 2019-03-08 NOTE — PROGRESS NOTES
Dr. Burgess at bedside. Patient states that she feels like she can't wake up from anethesia. She gets intermittent tachycardia since procedure. See VS flow sheet. Dr. Burgess states that she will admit patient for observation.

## 2019-03-08 NOTE — NURSING
Patient and spouse verbalized understanding of discharge instructions. Prescription provided. No questions or concerns. Wheelchair requested.

## 2019-03-08 NOTE — PLAN OF CARE
Patient discharged home 3/8/2019 with no needs.  Dr Ananth Burgess's office will schedule patient's post op appointment.       03/08/19 1139   Final Note   Assessment Type Final Discharge Note   Anticipated Discharge Disposition Home   Right Care Referral Info   Post Acute Recommendation No Care

## 2019-03-08 NOTE — NURSING TRANSFER
Nursing Transfer Note      3/7/2019     Transfer To: 542[facility] from [facility]    Transfer via stretcher    Transfer with SL x 1    Transported by PCT    Medicines sent: None    Chart send with patient: Yes    Notified: Report called to RN    Patient reassessed at: 1830. Awake and alert. Sleepy. VSS. Denies pain or nausea. Tolerating liquids well. Stable condition.  at side.    Upon arrival to floor: bed in lowest position

## 2019-03-08 NOTE — PLAN OF CARE
Patient is a 33 year old female admitted from home 3/7/2019 and underwent Robotic Ventral Hernia Repair with Mesh.  Patient discharged home 3/8/2019 prior to visit for admit assessment.      PCP  Faviola Solis MD  [address] [facility] [address] [phone]      [facility] [id number] [place] [address] - [address] AT [facility]  [address]  [facility] [address]  Phone: [phone] Fax: [phone]    [facility] [id number] [place] [address] - [address] [facility] [place]  [address]  [facility] [address]  Phone: [phone] Fax: [phone]      Extended Emergency Contact Information  Primary Emergency Contact: No,Contact   [place]  Relation: None       03/08/19 1137   Discharge Assessment   Assessment Type Discharge Planning Assessment   Assessment information obtained from? Medical Record   Discharge Plan A Home

## 2019-03-09 NOTE — DISCHARGE SUMMARY
[facility]  General Surgery  Discharge Summary      Patient Name: Ganesh Schroeder  MRN: [id number]  Admission Date: 3/7/2019  Hospital Length of Stay: 0 days  Discharge Date and Time: 3/8/2019 10:03 AM  Attending Physician: April Burgess MD  Discharging Provider: Gurmeet Sim MD  Primary Care Provider: Faviola Solis MD    HPI:   33 y.o. female with recurrent incisional ventral hernia who underwent robotic ventral incisional hernia repair with mesh on 3/7/19.    Procedure(s) (LRB):  XI ROBOTIC REPAIR, HERNIA, VENTRAL WITH MESH (N/A)      Indwelling Lines/Drains at time of discharge:   Lines/Drains/Airways          None        Hospital Course: Patient underwent robotic ventral incisional hernia repair with mesh on 3/7/19. Patient had tachycardia post-op and the decision was made to observe overnight. She was transferred to the floor from PACU. Her tachycardia improved and she was deemed safe for discharge home on 3/8.     Physical Exam   Constitutional: She is oriented to person, place, and time and well-developed, well-nourished, and in no distress.   HENT:   Head: Normocephalic and atraumatic.   Eyes: No scleral icterus.   Cardiovascular: Normal rate.   Pulmonary/Chest: Effort normal. No respiratory distress.   Abdominal: Soft. She exhibits no distension. There is no tenderness. There is no rebound and no guarding.   Incisions clean/dry/intact   Neurological: She is alert and oriented to person, place, and time.   Skin: Skin is warm and dry.   Psychiatric: Mood, memory, affect and judgment normal.   Nursing note and vitals reviewed.        Consults:     Significant Diagnostic Studies: Labs:   BMP:   Recent Labs   Lab 03/08/19  0601   GLU 94   *   K 3.9      CO2 22*   BUN 8   CREATININE 0.8   CALCIUM 8.4*    and CMP   Recent Labs   Lab 03/08/19  0601   *   K 3.9      CO2 22*   GLU 94   BUN 8   CREATININE 0.8   CALCIUM 8.4*   ANIONGAP 7*   ESTGFRAFRICA  >60.0   EGFRNONAA >60.0       Pending Diagnostic Studies:     None        Final Active Diagnoses:    Diagnosis Date Noted POA    PRINCIPAL PROBLEM:  Ventral incisional hernia [K43.2] 03/07/2019 Yes      Problems Resolved During this Admission:      Discharged Condition: good    Disposition: Home or Self Care    Follow Up:  Follow-up Information     April Burgess MD In 2 weeks.    Specialties:  Bariatrics, General Surgery  Why:  For wound re-check  Contact information:  [address]  [facility] [address]  [phone]                 Patient Instructions:      Lifting restrictions   Order Comments: Do not lift anything greater than 10-15 lbs for 6 weeks     Notify your health care provider if you experience any of the following:  increased confusion or weakness     Notify your health care provider if you experience any of the following:  persistent dizziness, light-headedness, or visual disturbances     Notify your health care provider if you experience any of the following:  worsening rash     Notify your health care provider if you experience any of the following:  severe persistent headache     Notify your health care provider if you experience any of the following:  difficulty breathing or increased cough     Notify your health care provider if you experience any of the following:  redness, tenderness, or signs of infection (pain, swelling, redness, odor or green/yellow discharge around incision site)     Notify your health care provider if you experience any of the following:  severe uncontrolled pain     Notify your health care provider if you experience any of the following:  persistent nausea and vomiting or diarrhea     Notify your health care provider if you experience any of the following:  temperature >100.4     No dressing needed     Activity as tolerated   Order Comments: May shower tomorrow.  Wash gently with warm soap and water.  Do not bathe or soak the incision for two weeks.   Dermabond (surgical glue) will peel off on its own after a couple of weeks.    You may need an over the counter stool softener, such as Miralax, for constipation caused by the pain medication.  We recommend titrating this to 1-2 BM every 1-2 days.     Medications:  Reconciled Home Medications:      Medication List      START taking these medications    oxyCODONE-acetaminophen 5-325 mg per tablet  Commonly known as:  PERCOCET  Take 1-2 tablets by mouth every 4 (four) hours as needed for Pain.     polyethylene glycol 17 gram/dose powder  Commonly known as:  GLYCOLAX  Take 17 g by mouth 2 (two) times daily as needed (Constipation).        CONTINUE taking these medications    levonorgestrel 20 mcg/24 hr (5 years) IUD  Commonly known as:  MIRENA  1 each by Intrauterine route once.     penicillin v potassium 500 MG tablet  Commonly known as:  VEETID  TK 1 T PO BID     tobramycin-dexamethasone 0.3-0.1% 0.3-0.1 % Drps  Commonly known as:  TOBRADEX  INT 1 GTT INTO OS QID          Time spent on the discharge of patient: 20 minutes    Gurmeet Sim MD  General Surgery  [facility]

## 2019-03-10 NOTE — ANESTHESIA POSTPROCEDURE EVALUATION
"Anesthesia Post Evaluation    Patient: Ganesh Schroeder    Procedure(s) Performed: Procedure(s) (LRB):  XI ROBOTIC REPAIR, HERNIA, VENTRAL WITH MESH (N/A)    Final Anesthesia Type: general  Patient location during evaluation: PACU  Patient participation: Yes- Able to Participate  Level of consciousness: awake and alert  Post-procedure vital signs: reviewed and stable  Pain management: adequate  Airway patency: patent  PONV status at discharge: No PONV  Anesthetic complications: no      Cardiovascular status: blood pressure returned to baseline and stable  Respiratory status: unassisted  Hydration status: euvolemic  Follow-up not needed.        Visit Vitals  BP (!) 97/50 (BP Location: Left arm, Patient Position: Lying)   Pulse 66   Temp 35.9 °C (96.7 °F) (Oral)   Resp 17   Ht 5' 4" (1.626 m)   Wt 68 kg (150 lb)   LMP  (Within Years)   SpO2 97%   Breastfeeding? No   BMI 25.75 kg/m²       Pain/Chandni Score: No Data Recorded      "

## 2019-03-11 ENCOUNTER — PATIENT MESSAGE (OUTPATIENT)
Dept: SURGERY | Facility: CLINIC | Age: 34
End: 2019-03-11

## 2019-03-26 ENCOUNTER — OFFICE VISIT (OUTPATIENT)
Dept: SURGERY | Facility: CLINIC | Age: 34
End: 2019-03-26
Payer: COMMERCIAL

## 2019-03-26 VITALS
SYSTOLIC BLOOD PRESSURE: 105 MMHG | HEART RATE: 70 BPM | DIASTOLIC BLOOD PRESSURE: 72 MMHG | WEIGHT: 153.69 LBS | HEIGHT: 64 IN | BODY MASS INDEX: 26.24 KG/M2

## 2019-03-26 DIAGNOSIS — Z87.19 S/P REPAIR OF RECURRENT VENTRAL HERNIA: ICD-10-CM

## 2019-03-26 DIAGNOSIS — Z98.890 S/P REPAIR OF RECURRENT VENTRAL HERNIA: ICD-10-CM

## 2019-03-26 DIAGNOSIS — K43.2 RECURRENT VENTRAL INCISIONAL HERNIA: Primary | ICD-10-CM

## 2019-03-26 PROCEDURE — 99024 POSTOP FOLLOW-UP VISIT: CPT | Mod: S$GLB,,, | Performed by: SURGERY

## 2019-03-26 PROCEDURE — 99999 PR PBB SHADOW E&M-EST. PATIENT-LVL III: CPT | Mod: PBBFAC,,, | Performed by: SURGERY

## 2019-03-26 PROCEDURE — 99999 PR PBB SHADOW E&M-EST. PATIENT-LVL III: ICD-10-PCS | Mod: PBBFAC,,, | Performed by: SURGERY

## 2019-03-26 PROCEDURE — 99024 PR POST-OP FOLLOW-UP VISIT: ICD-10-PCS | Mod: S$GLB,,, | Performed by: SURGERY

## 2019-03-26 NOTE — PROGRESS NOTES
HPI:  Mrs. Schroeder is a 33 year-old woman who presents for 2-week f/u status post-robotic recurrent ventral incisional hernia repair with mesh on 3/7/19. She is overall doing well without complaints. She has minimal pain at this point and is back at work and performing all daily tasks. She is eating well and with normal BMs. She had experienced some redness around her incisions and one day of clear drainage from the lower incision, all of which has resolved. She denies fevers, chills or s/s of systemic infection. She does not note any recurrence.    PHYSICAL EXAM:  Physical Exam   Constitutional: She is oriented to person, place, and time. She appears well-nourished. No distress.   Cardiovascular: Normal rate and regular rhythm.   Pulmonary/Chest: Effort normal. No respiratory distress.   Abdominal: Soft. She exhibits no distension. There is no tenderness. There is no guarding. No hernia.   Small palpable firmness <1cm in size likely seroma just overlying site of hernia repair   Musculoskeletal: Normal range of motion. She exhibits no edema or deformity.   Neurological: She is alert and oriented to person, place, and time.   Skin: Skin is warm and dry. No rash noted. No erythema.   Well-healed incisions x3   Psychiatric: She has a normal mood and affect. Her behavior is normal.       ASSESSMENT:    The patient is doing well after surgery.     PLAN:    Follow up PRN.  Activity: light duty for 4 more weeks    April Burgess  3/26/2019

## 2019-04-25 ENCOUNTER — PATIENT MESSAGE (OUTPATIENT)
Dept: RESEARCH | Facility: HOSPITAL | Age: 34
End: 2019-04-25

## 2019-05-09 NOTE — HPI
33 y.o. female with recurrent incisional ventral hernia who underwent robotic ventral incisional hernia repair with mesh on 3/7/19.   Declined

## 2019-05-15 ENCOUNTER — OFFICE VISIT (OUTPATIENT)
Dept: INTERNAL MEDICINE | Facility: CLINIC | Age: 34
End: 2019-05-15
Payer: COMMERCIAL

## 2019-05-15 VITALS
HEART RATE: 68 BPM | BODY MASS INDEX: 26.72 KG/M2 | SYSTOLIC BLOOD PRESSURE: 126 MMHG | DIASTOLIC BLOOD PRESSURE: 70 MMHG | TEMPERATURE: 98 F | OXYGEN SATURATION: 98 % | WEIGHT: 156.5 LBS | HEIGHT: 64 IN

## 2019-05-15 DIAGNOSIS — J32.9 SINUSITIS, UNSPECIFIED CHRONICITY, UNSPECIFIED LOCATION: Primary | ICD-10-CM

## 2019-05-15 PROCEDURE — 3008F BODY MASS INDEX DOCD: CPT | Mod: CPTII,S$GLB,, | Performed by: INTERNAL MEDICINE

## 2019-05-15 PROCEDURE — 99999 PR PBB SHADOW E&M-EST. PATIENT-LVL III: CPT | Mod: PBBFAC,,, | Performed by: INTERNAL MEDICINE

## 2019-05-15 PROCEDURE — 99213 OFFICE O/P EST LOW 20 MIN: CPT | Mod: S$GLB,,, | Performed by: INTERNAL MEDICINE

## 2019-05-15 PROCEDURE — 99213 PR OFFICE/OUTPT VISIT, EST, LEVL III, 20-29 MIN: ICD-10-PCS | Mod: S$GLB,,, | Performed by: INTERNAL MEDICINE

## 2019-05-15 PROCEDURE — 99999 PR PBB SHADOW E&M-EST. PATIENT-LVL III: ICD-10-PCS | Mod: PBBFAC,,, | Performed by: INTERNAL MEDICINE

## 2019-05-15 PROCEDURE — 3008F PR BODY MASS INDEX (BMI) DOCUMENTED: ICD-10-PCS | Mod: CPTII,S$GLB,, | Performed by: INTERNAL MEDICINE

## 2019-05-15 RX ORDER — PROMETHAZINE HYDROCHLORIDE AND CODEINE PHOSPHATE 6.25; 1 MG/5ML; MG/5ML
5 SOLUTION ORAL NIGHTLY PRN
Qty: 120 ML | Refills: 0 | Status: SHIPPED | OUTPATIENT
Start: 2019-05-15 | End: 2019-05-25

## 2019-05-15 RX ORDER — AMOXICILLIN 875 MG/1
875 TABLET, FILM COATED ORAL EVERY 12 HOURS
Qty: 20 TABLET | Refills: 0 | Status: SHIPPED | OUTPATIENT
Start: 2019-05-15 | End: 2019-09-09

## 2019-05-15 NOTE — PROGRESS NOTES
Subjective:       Patient ID: Ganesh Schroeder is a 33 y.o. female.    Chief Complaint: Sinus Problem    2 weeks ago patient thought she was having an allergy attack - runny/stuffy nose, lots of sneezing, scratchy throat.  Zyzal did not help.  Then she began to have facial headache and bad tasting post nasal drip.  Now cough and nose are productive of thick green mucus.  No fever or chills    Review of Systems   Constitutional: Negative for activity change, chills, fatigue and fever.   HENT: Negative for congestion, ear pain, nosebleeds, postnasal drip, sinus pressure and sore throat.    Eyes: Negative.  Negative for visual disturbance.   Respiratory: Negative for cough, chest tightness, shortness of breath and wheezing.    Cardiovascular: Negative for chest pain.   Gastrointestinal: Negative for abdominal pain, diarrhea, nausea and vomiting.   Genitourinary: Negative for difficulty urinating, dysuria, frequency and urgency.   Musculoskeletal: Negative for arthralgias and neck stiffness.   Skin: Negative for rash.   Neurological: Negative for dizziness, weakness and headaches.   Psychiatric/Behavioral: Negative for sleep disturbance. The patient is not nervous/anxious.        Objective:      Physical Exam   Constitutional: She is oriented to person, place, and time. She appears well-developed and well-nourished.  Non-toxic appearance. No distress.   HENT:   Head: Normocephalic and atraumatic.   Right Ear: Tympanic membrane, external ear and ear canal normal.   Left Ear: Tympanic membrane, external ear and ear canal normal.   Nose: Right sinus exhibits maxillary sinus tenderness and frontal sinus tenderness. Left sinus exhibits maxillary sinus tenderness and frontal sinus tenderness.   Mouth/Throat:       Eyes: Pupils are equal, round, and reactive to light. EOM are normal. No scleral icterus.   Neck: Normal range of motion. Neck supple. No thyromegaly present.   Cardiovascular: Normal rate, regular rhythm and normal  heart sounds.   Pulmonary/Chest: Effort normal and breath sounds normal.   Abdominal: Soft. Bowel sounds are normal. She exhibits no mass. There is no tenderness. There is no rebound.   Musculoskeletal: Normal range of motion.   Lymphadenopathy:     She has no cervical adenopathy.   Neurological: She is alert and oriented to person, place, and time. She has normal reflexes. She displays normal reflexes. No cranial nerve deficit. She exhibits normal muscle tone. Coordination normal.   Skin: Skin is warm and dry.   Psychiatric: She has a normal mood and affect. Her behavior is normal.       Assessment:       1. Sinusitis, unspecified chronicity, unspecified location        Plan:   Ganesh was seen today for sinus problem.    Diagnoses and all orders for this visit:    Sinusitis, unspecified chronicity, unspecified location    Other orders  -     amoxicillin (AMOXIL) 875 MG tablet; Take 1 tablet (875 mg total) by mouth every 12 (twelve) hours.  -     promethazine-codeine 6.25-10 mg/5 ml (PHENERGAN WITH CODEINE) 6.25-10 mg/5 mL syrup; Take 5 mLs by mouth nightly as needed.

## 2019-08-29 ENCOUNTER — OFFICE VISIT (OUTPATIENT)
Dept: INTERNAL MEDICINE | Facility: CLINIC | Age: 34
End: 2019-08-29
Attending: INTERNAL MEDICINE
Payer: COMMERCIAL

## 2019-08-29 VITALS
SYSTOLIC BLOOD PRESSURE: 110 MMHG | DIASTOLIC BLOOD PRESSURE: 80 MMHG | WEIGHT: 155.63 LBS | HEIGHT: 64 IN | BODY MASS INDEX: 26.57 KG/M2 | HEART RATE: 83 BPM

## 2019-08-29 DIAGNOSIS — J32.8 OTHER CHRONIC SINUSITIS: Primary | ICD-10-CM

## 2019-08-29 PROCEDURE — 3008F PR BODY MASS INDEX (BMI) DOCUMENTED: ICD-10-PCS | Mod: CPTII,S$GLB,, | Performed by: STUDENT IN AN ORGANIZED HEALTH CARE EDUCATION/TRAINING PROGRAM

## 2019-08-29 PROCEDURE — 3008F BODY MASS INDEX DOCD: CPT | Mod: CPTII,S$GLB,, | Performed by: STUDENT IN AN ORGANIZED HEALTH CARE EDUCATION/TRAINING PROGRAM

## 2019-08-29 PROCEDURE — 99213 PR OFFICE/OUTPT VISIT, EST, LEVL III, 20-29 MIN: ICD-10-PCS | Mod: S$GLB,,, | Performed by: STUDENT IN AN ORGANIZED HEALTH CARE EDUCATION/TRAINING PROGRAM

## 2019-08-29 PROCEDURE — 99213 OFFICE O/P EST LOW 20 MIN: CPT | Mod: S$GLB,,, | Performed by: STUDENT IN AN ORGANIZED HEALTH CARE EDUCATION/TRAINING PROGRAM

## 2019-08-29 PROCEDURE — 99999 PR PBB SHADOW E&M-EST. PATIENT-LVL IV: ICD-10-PCS | Mod: PBBFAC,,, | Performed by: STUDENT IN AN ORGANIZED HEALTH CARE EDUCATION/TRAINING PROGRAM

## 2019-08-29 PROCEDURE — 99999 PR PBB SHADOW E&M-EST. PATIENT-LVL IV: CPT | Mod: PBBFAC,,, | Performed by: STUDENT IN AN ORGANIZED HEALTH CARE EDUCATION/TRAINING PROGRAM

## 2019-08-29 RX ORDER — PREDNISONE 20 MG/1
40 TABLET ORAL DAILY
Qty: 14 TABLET | Refills: 0 | Status: SHIPPED | OUTPATIENT
Start: 2019-08-29 | End: 2019-09-05

## 2019-08-29 NOTE — PROGRESS NOTES
[facility]IDENT [facility]                                                             CLINIC NOTE    Patient Name: Ganesh Schroeder  YOB: 1985    PRESENTING HISTORY     Chief Complaint   Patient presents with    Nasal Congestion       History of Present Illness:  Ms. Ganesh Schroeder is a 34 y.o. female wo significant PMHx.     Pt presents for evaluation of nasal congestion for the past three months. She initially visited with internal medicine who prescribed amoxacillin and promethazine with codeine for sinusitis with little benefit, unfortunately. She has also tried intranasal fluticasone BID for the past two months with minimal benefit. Last night she attempted symptomatic relief with nasal lavage but found this only made her congestion worse and resulted in the development of post-nasal drip and a sensation of ear fullness. She denies fever, sneezing or sore throat.     Her sinus congestion has been accompanied by near daily frontal headache for which she has been taking OTC NSAID's. She also reports a history of exercise-induced asthma, however she has not had to use her inhaler since this sinus congestion had started.     Review of Systems   Constitutional: Negative for chills, fever, malaise/fatigue and weight loss.   HENT: Positive for congestion and sinus pain. Negative for ear discharge and sore throat.    Eyes: Negative for blurred vision and redness.   Respiratory: Negative for cough, shortness of breath and wheezing.    Cardiovascular: Negative for chest pain, palpitations and leg swelling.   Gastrointestinal: Negative for abdominal pain, constipation, diarrhea, nausea and vomiting.   Genitourinary: Negative for dysuria and frequency.   Musculoskeletal: Negative for joint pain and myalgias.   Skin: Negative for itching and rash.   Neurological: Negative for dizziness, seizures, loss of consciousness and headaches.   Endo/Heme/Allergies: Negative  for environmental allergies. Does not bruise/bleed easily.   Psychiatric/Behavioral: Negative for depression. The patient is not nervous/anxious and does not have insomnia.          PAST HISTORY:     Past Medical History:   Diagnosis Date    Abnormal Pap smear of cervix        Past Surgical History:   Procedure Laterality Date     SECTION  2010, 7/29/2014    x2    PELVIC LAPAROSCOPY      endometriosis    SALPINGECTOMY Right 2011    ectopic pregnancy    UMBILICAL HERNIA REPAIR  2016    XI ROBOTIC REPAIR, HERNIA, VENTRAL WITH MESH N/A 3/7/2019    Performed by April Burgess MD at [facility] [address] [place] [address]       Family History   Problem Relation Age of Onset    No Known Problems Paternal Grandfather     No Known Problems Paternal Grandmother     No Known Problems Maternal Grandmother     No Known Problems Maternal Grandfather     No Known Problems Father     No Known Problems Mother     No Known Problems Sister     No Known Problems Child     No Known Problems Child     Heart attack Neg Hx     Stroke Neg Hx     Diabetes Neg Hx     Cancer Neg Hx        Social History     Socioeconomic History    Marital status:      Spouse name: Not on file    Number of children: Not on file    Years of education: Not on file    Highest education level: Not on file   Occupational History    Not on file   Social Needs    Financial resource strain: Not on file    Food insecurity:     Worry: Not on file     Inability: Not on file    Transportation needs:     Medical: Not on file     Non-medical: Not on file   Tobacco Use    Smoking status: Never Smoker    Smokeless tobacco: Never Used   Substance and Sexual Activity    Alcohol use: No     Frequency: Never    Drug use: No    Sexual activity: Yes     Partners: Male     Birth control/protection: IUD   Lifestyle    Physical activity:     Days per week: Not on file     Minutes per session: Not on file    Stress: Not on file  "  Relationships    Social connections:     Talks on phone: Not on file     Gets together: Not on file     Attends Jainism service: Not on file     Active member of club or organization: Not on file     Attends meetings of clubs or organizations: Not on file     Relationship status: Not on file   Other Topics Concern    Not on file   Social History Narrative    Not on file       MEDICATIONS & ALLERGIES:               Medication List with Changes/Refills   Current Medications    AMOXICILLIN (AMOXIL) 875 MG TABLET    Take 1 tablet (875 mg total) by mouth every 12 (twelve) hours.    LEVONORGESTREL (MIRENA) 20 MCG/24 HR (5 YEARS) IUD    1 each by Intrauterine route once.    OXYCODONE-ACETAMINOPHEN (PERCOCET) 5-325 MG PER TABLET    Take 1-2 tablets by mouth every 4 (four) hours as needed for Pain.    PENICILLIN V POTASSIUM (VEETID) 500 MG TABLET    TK 1 T PO BID    POLYETHYLENE GLYCOL (GLYCOLAX) 17 GRAM/DOSE POWDER    Take 17 g by mouth 2 (two) times daily as needed (Constipation).    TOBRAMYCIN-DEXAMETHASONE 0.3-0.1% (TOBRADEX) 0.3-0.1 % DRPS    INT 1 GTT INTO OS QID       Review of patient's allergies indicates:  No Known Allergies    OBJECTIVE:     Vital Signs:  Vitals:    08/29/19 0915   BP: 110/80   Pulse: 83   Weight: 70.6 kg (155 lb 10.3 oz)   Height: 5' 4" (1.626 m)     Wt Readings from Last 5 Encounters:   08/29/19 70.6 kg (155 lb 10.3 oz)   05/15/19 71 kg (156 lb 8.4 oz)   03/26/19 69.7 kg (153 lb 10.6 oz)   03/07/19 68 kg (150 lb)   02/21/19 68.9 kg (152 lb)       No results found for this or any previous visit (from the past 24 hour(s)).      Physical Exam   Constitutional: She is oriented to person, place, and time and well-developed, well-nourished, and in no distress.   HENT:   Head: Normocephalic and atraumatic.   Nose: Mucosal edema, rhinorrhea and nasal deformity present.   Marked mucosal edema with near occlusion of her nasal airway.      Concern for possible polyps.    Eyes: Pupils are equal, " round, and reactive to light. EOM are normal. No scleral icterus.   Neck: Normal range of motion. Neck supple. No thyromegaly present.   Cardiovascular: Normal rate, regular rhythm and normal heart sounds.   No murmur heard.  Pulmonary/Chest: Effort normal and breath sounds normal. No respiratory distress. She has no wheezes.   Abdominal: Soft. Bowel sounds are normal. She exhibits no distension. There is no tenderness.   Lymphadenopathy:     She has no cervical adenopathy.   Neurological: She is alert and oriented to person, place, and time.   Skin: Skin is warm and dry. No erythema.   Psychiatric: Affect normal.         ASSESSMENT & PLAN:     Ganesh was seen today for evaluation of sinus congestion of two months' duration refractory to a course of antibiotics and intranasal topical steroids. 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 Will treat mucosal edema with prednisone x 7 days and refer to ENT for further evaluation with nasal endoscopy vs CT imaging.     Diagnoses and all orders for this visit:    Other chronic sinusitis  -     predniSONE (DELTASONE) 20 MG tablet; Take 2 tablets (40 mg total) by mouth once daily. for 7 days  -     Ambulatory consult to ENT      Advised her to reach out to set up an appointment with her PCP.    RTC as needed. Pt was instructed to contact the clinic if symptoms worsened or if new symptoms arise.    I have discussed the plan with Dr Kevin.    Behram Khan, MD  Internal Medicine PGY-3  M: [phone]

## 2019-09-06 NOTE — PROGRESS NOTES
"I have personally taken the history and examined this patient and agree with the resident's note as stated above with the following thoughts:  /80 (BP Location: Left arm, Patient Position: Sitting, BP Method: Medium (Manual))   Pulse 83   Ht 5' 4" (1.626 m)   Wt 70.6 kg (155 lb 10.3 oz)   BMI 26.72 kg/m²     WIll have her referred to ent for upper airway obstruction.     "

## 2019-09-09 ENCOUNTER — OFFICE VISIT (OUTPATIENT)
Dept: OTOLARYNGOLOGY | Facility: CLINIC | Age: 34
End: 2019-09-09
Payer: COMMERCIAL

## 2019-09-09 VITALS
WEIGHT: 156 LBS | HEART RATE: 73 BPM | DIASTOLIC BLOOD PRESSURE: 78 MMHG | SYSTOLIC BLOOD PRESSURE: 104 MMHG | BODY MASS INDEX: 26.78 KG/M2

## 2019-09-09 DIAGNOSIS — J31.0 CHRONIC RHINITIS: Primary | ICD-10-CM

## 2019-09-09 PROCEDURE — 99999 PR PBB SHADOW E&M-EST. PATIENT-LVL III: CPT | Mod: PBBFAC,,, | Performed by: NURSE PRACTITIONER

## 2019-09-09 PROCEDURE — 3008F PR BODY MASS INDEX (BMI) DOCUMENTED: ICD-10-PCS | Mod: CPTII,S$GLB,, | Performed by: NURSE PRACTITIONER

## 2019-09-09 PROCEDURE — 99202 OFFICE O/P NEW SF 15 MIN: CPT | Mod: S$GLB,,, | Performed by: NURSE PRACTITIONER

## 2019-09-09 PROCEDURE — 99999 PR PBB SHADOW E&M-EST. PATIENT-LVL III: ICD-10-PCS | Mod: PBBFAC,,, | Performed by: NURSE PRACTITIONER

## 2019-09-09 PROCEDURE — 3008F BODY MASS INDEX DOCD: CPT | Mod: CPTII,S$GLB,, | Performed by: NURSE PRACTITIONER

## 2019-09-09 PROCEDURE — 99202 PR OFFICE/OUTPT VISIT, NEW, LEVL II, 15-29 MIN: ICD-10-PCS | Mod: S$GLB,,, | Performed by: NURSE PRACTITIONER

## 2019-09-09 RX ORDER — CETIRIZINE HYDROCHLORIDE 10 MG/1
10 TABLET ORAL DAILY
Qty: 30 TABLET | Refills: 2 | Status: SHIPPED | OUTPATIENT
Start: 2019-09-09 | End: 2020-12-02 | Stop reason: SDUPTHER

## 2019-09-09 NOTE — PROGRESS NOTES
Subjective:      Ganesh Schroeder is a 34 y.o. female who was referred to me by Dr. Behram Khan in consultation for nasal congestion.    Ms. Schroeder presents today with nasal congestion for the past 3 months. She reports having a sinus infection in May that was treated with oral antibiotics and Flonase without benefit. She was recently treated with oral prednisone with significant benefit. She continues to have nasal congestion but states it is much better now. She has associated rhinorrhea, sneezing and post-nasal drip. She denies any antihistamine use.    Current sinonasal medications as above.  She does not regularly use nasal decongestant sprays.    She does not recall previously having allergy testing.    She relates a history of asthma as a child.    She denies a history of reflux symptoms.  She has not previously had an EGD.    She denies have a diagnosis of obstructive sleep apnea.     She has not had sinonasal surgery.    She does not recall a prior history of nasal trauma.    SNOT-22 score = 38, NOSE score = 55%    Past Medical History  She has a past medical history of Abnormal Pap smear of cervix.    Past Surgical History  She has a past surgical history that includes Pelvic laparoscopy; Salpingectomy (Right, );  section (2010, 2014); Umbilical hernia repair (2016); and Robot-assisted repair of ventral hernia using da Lora Xi (N/A, 3/7/2019).    Family History  Her family history includes No Known Problems in her child, child, father, maternal grandfather, maternal grandmother, mother, paternal grandfather, paternal grandmother, and sister.    Social History  She reports that she has never smoked. She has never used smokeless tobacco. She reports that she does not drink alcohol or use drugs.    Allergies  She has No Known Allergies.    Medications   She has a current medication list which includes the following prescription(s): cetirizine.    Review of Systems  Review of  Systems   Constitutional: Negative for chills, fatigue and fever.   HENT: Positive for congestion, postnasal drip, rhinorrhea and sneezing. Negative for facial swelling, nosebleeds, sinus pressure, sinus pain, sore throat and tinnitus.    Eyes: Positive for itching. Negative for photophobia, redness and visual disturbance.   Respiratory: Negative for apnea, cough, shortness of breath, wheezing and stridor.    Cardiovascular: Negative for chest pain and palpitations.   Gastrointestinal: Negative for diarrhea, nausea and vomiting.   Endocrine: Negative.    Genitourinary: Negative for decreased urine volume, dysuria and frequency.   Musculoskeletal: Negative for arthralgias, myalgias and neck stiffness.   Skin: Negative for rash and wound.   Allergic/Immunologic: Positive for environmental allergies. Negative for food allergies and immunocompromised state.   Neurological: Negative for dizziness, syncope, weakness, light-headedness and headaches.   Hematological: Negative for adenopathy. Does not bruise/bleed easily.   Psychiatric/Behavioral: Negative for confusion, decreased concentration and sleep disturbance.          Objective:     /78   Pulse 73   Wt 70.8 kg (156 lb)   BMI 26.78 kg/m²        Constitutional:   She is oriented to person, place, and time. Vital signs are normal. She appears well-developed and well-nourished. She appears alert. Normal speech.      Head:  Normocephalic and atraumatic.     Ears:    Right Ear: No lacerations. No drainage, swelling or tenderness. No foreign bodies. No mastoid tenderness. Tympanic membrane is not injected, not scarred, not perforated, not erythematous, not retracted and not bulging. Tympanic membrane mobility is normal. No middle ear effusion. No hemotympanum.   Left Ear: No lacerations. No drainage, swelling or tenderness. No foreign bodies. No mastoid tenderness. Tympanic membrane is not injected, not scarred, not perforated, not erythematous, not retracted and  not bulging. Tympanic membrane mobility is normal.  No middle ear effusion. No hemotympanum.     Nose:  Mucosal edema and rhinorrhea present. No nose lacerations, sinus tenderness, septal deviation, nasal septal hematoma or polyps. No epistaxis.  No foreign bodies. No turbinate hypertrophy.  Right sinus exhibits no maxillary sinus tenderness and no frontal sinus tenderness. Left sinus exhibits no maxillary sinus tenderness and no frontal sinus tenderness.     Mouth/Throat  Oropharynx clear and moist without lesions or asymmetry, normal uvula midline and lips, teeth, and gums normal. No uvula swelling, oral lesions, trismus, mucous membrane lesions or xerostomia. No oropharyngeal exudate, posterior oropharyngeal edema or posterior oropharyngeal erythema.     Neck:  Neck normal without thyromegaly masses, asymmetry, normal tracheal structure, crepitus, and tenderness and no adenopathy.     Cardiovascular:   Normal heart sounds and normal pulses.      Pulmonary/Chest:   Effort normal and breath sounds normal.     Psychiatric:   She has a normal mood and affect. Her speech is normal and behavior is normal.     Neurological:   She is alert and oriented to person, place, and time. No cranial nerve deficit.     Skin:   No abrasions, lacerations, lesions, or rashes.       Procedure    None        Data Reviewed    WBC (K/uL)   Date Value   03/08/2019 11.08     Eosinophil% (%)   Date Value   03/08/2019 0.0     Eos # (K/uL)   Date Value   03/08/2019 0.0     Platelets (K/uL)   Date Value   03/08/2019 206     Glucose (mg/dL)   Date Value   03/08/2019 94     No results found for: IGE    No sinus imaging available.       Assessment:     1. Chronic rhinitis         Plan:     I had a long discussion with the patient regarding her condition and the further workup and management options.    Ganesh was seen today for sinus problem.    Diagnoses and all orders for this visit:    Chronic rhinitis  -     cetirizine (ZYRTEC) 10 MG tablet;  Take 1 tablet (10 mg total) by mouth once daily.        -     Continue Flonase - two sprays each nostril once daily.    Follow up in about 6 months (around 3/9/2020), or if symptoms worsen or fail to improve.

## 2019-09-09 NOTE — LETTER
September 9, 2019      Behram Khan, MD  [address]  [facility] [address]           [facility]  [address]  [facility] [address]  Phone: [phone]  Fax: [phone]          Patient: Ganesh Schroeder   MR Number: [id number]   YOB: 1985   Date of Visit: 9/9/2019       Dear Dr. Behram Khan:    Thank you for referring Ganesh Schroeder to me for evaluation. Attached you will find relevant portions of my assessment and plan of care.    If you have questions, please do not hesitate to call me. I look forward to following Ganesh Schroeder along with you.    Sincerely,    Kathrine Ovalle, NP    Enclosure  CC:  No Recipients    If you would like to receive this communication electronically, please contact [email or web address] or [phone] to request more information on [facility] access.    For providers and/or their staff who would like to refer a patient to [facility], please contact us through our one-stop-shop provider referral line, [facility], at [phone].    If you feel you have received this communication in error or would no longer like to receive these types of communications, please e-mail [email or web address]

## 2019-12-22 ENCOUNTER — PATIENT OUTREACH (OUTPATIENT)
Dept: ADMINISTRATIVE | Facility: OTHER | Age: 34
End: 2019-12-22

## 2019-12-26 ENCOUNTER — IMMUNIZATION (OUTPATIENT)
Dept: PHARMACY | Facility: CLINIC | Age: 34
End: 2019-12-26
Payer: COMMERCIAL

## 2019-12-26 ENCOUNTER — OFFICE VISIT (OUTPATIENT)
Dept: OTOLARYNGOLOGY | Facility: CLINIC | Age: 34
End: 2019-12-26
Payer: COMMERCIAL

## 2019-12-26 DIAGNOSIS — J31.0 CHRONIC RHINITIS: Primary | ICD-10-CM

## 2019-12-26 DIAGNOSIS — J34.89 NASAL OBSTRUCTION: ICD-10-CM

## 2019-12-26 DIAGNOSIS — J34.2 NASAL SEPTAL DEVIATION: ICD-10-CM

## 2019-12-26 DIAGNOSIS — J34.3 HYPERTROPHY OF INFERIOR NASAL TURBINATE: ICD-10-CM

## 2019-12-26 PROCEDURE — 31231 NASAL ENDOSCOPY DX: CPT | Mod: S$GLB,,, | Performed by: NURSE PRACTITIONER

## 2019-12-26 PROCEDURE — 99213 OFFICE O/P EST LOW 20 MIN: CPT | Mod: 25,S$GLB,, | Performed by: NURSE PRACTITIONER

## 2019-12-26 PROCEDURE — 31231 NASAL/SINUS ENDOSCOPY: ICD-10-PCS | Mod: S$GLB,,, | Performed by: NURSE PRACTITIONER

## 2019-12-26 PROCEDURE — 99213 PR OFFICE/OUTPT VISIT, EST, LEVL III, 20-29 MIN: ICD-10-PCS | Mod: 25,S$GLB,, | Performed by: NURSE PRACTITIONER

## 2019-12-26 PROCEDURE — 99999 PR PBB SHADOW E&M-EST. PATIENT-LVL II: CPT | Mod: PBBFAC,,, | Performed by: NURSE PRACTITIONER

## 2019-12-26 PROCEDURE — 99999 PR PBB SHADOW E&M-EST. PATIENT-LVL II: ICD-10-PCS | Mod: PBBFAC,,, | Performed by: NURSE PRACTITIONER

## 2019-12-26 RX ORDER — AZELASTINE 1 MG/ML
1 SPRAY, METERED NASAL 2 TIMES DAILY
Qty: 30 ML | Refills: 3 | Status: SHIPPED | OUTPATIENT
Start: 2019-12-26 | End: 2020-12-07

## 2019-12-26 NOTE — PROCEDURES
"Nasal/sinus endoscopy  Date/Time: 12/26/2019 7:30 AM    Time out: Immediately prior to procedure a "time out" was called to verify the correct patient, procedure, equipment, support staff and site/side marked as required.  Performed by: Kathrine Ovalle NP  Authorized by: Kathrine Ovalle NP     Consent Done?:  Yes (Verbal)  Anesthesia:     Local anesthetic:  Lidocaine 4% and Rohit-Synephrine 1/2%    Type of Endoscope:  Flexible    Patient tolerance:  Patient tolerated the procedure well with no immediate complications  Nose:     Procedure Performed:  Nasal Endoscopy  External:      No external nasal deformity  Intranasal:      Mucosa no polyps     Mucosa ulcers not present     No mucosa lesions present     Enlarged turbinates     Septum gross deformity  Nasopharynx:      No mucosa lesions     Adenoids not present     Posterior choanae patent     Eustachian tube not patent     Right septal deviation  Bilateral inferior turbinate hypertrophy  Clear mucoid mucus flow in posterior nasal cavity  No polyps or purulence  Examined with scope # [id number]      "

## 2019-12-26 NOTE — PROGRESS NOTES
Subjective:      Ganesh is a 34 y.o. female who comes for follow-up of rhinitis.  Her last visit with me was on 9/9/2019.  Ms. Schroeder reports continue nasal congestion that is worse at night when she lays down. He states the nasal congestion makes her have to breathe through her mouth causing dry mouth. She reports associated rhinorrhea. She denies sinus pressure or pain. She denies cough or fever. She has tried fluticasone and Zyrtec without benefit.    HPI (9/9/19):  Ganesh Schroeder is a 34 y.o. female who was referred to me by Dr. Behram Khan in consultation for nasal congestion. Ms. Schroeder presents today with nasal congestion for the past 3 months. She reports having a sinus infection in May that was treated with oral antibiotics and Flonase without benefit. She was recently treated with oral prednisone with significant benefit. She continues to have nasal congestion but states it is much better now. She has associated rhinorrhea, sneezing and post-nasal drip. She denies any antihistamine use. Current sinonasal medications as above.  She does not regularly use nasal decongestant sprays. She does not recall previously having allergy testing. She relates a history of asthma as a child. She denies a history of reflux symptoms.  She has not previously had an EGD. She denies have a diagnosis of obstructive sleep apnea. She has not had sinonasal surgery. She does not recall a prior history of nasal trauma.        The patient's medications, allergies, past medical, surgical, social and family histories were reviewed and updated as appropriate.    A detailed review of systems was obtained with pertinent positives as per the above HPI, and otherwise negative.        Objective:     There were no vitals taken for this visit.       Constitutional:   She is oriented to person, place, and time. Vital signs are normal. She appears well-developed and well-nourished. She appears alert. Normal speech.       Head:  Normocephalic and atraumatic.     Ears:    Right Ear: No lacerations. No drainage, swelling or tenderness. No foreign bodies. No mastoid tenderness. Tympanic membrane is not injected, not scarred, not perforated, not erythematous, not retracted and not bulging. Tympanic membrane mobility is normal. No middle ear effusion. No hemotympanum.   Left Ear: No lacerations. No drainage, swelling or tenderness. No foreign bodies. No mastoid tenderness. Tympanic membrane is not injected, not scarred, not perforated, not erythematous, not retracted and not bulging. Tympanic membrane mobility is normal.  No middle ear effusion. No hemotympanum.     Nose:  Mucosal edema and rhinorrhea present. No nose lacerations, sinus tenderness, septal deviation, nasal septal hematoma or polyps. No epistaxis.  No foreign bodies. Turbinate hypertrophy.  Right sinus exhibits no maxillary sinus tenderness and no frontal sinus tenderness. Left sinus exhibits no maxillary sinus tenderness and no frontal sinus tenderness.     Mouth/Throat  Oropharynx clear and moist without lesions or asymmetry, normal uvula midline and lips, teeth, and gums normal. No uvula swelling, oral lesions, trismus, mucous membrane lesions or xerostomia. No oropharyngeal exudate, posterior oropharyngeal edema or posterior oropharyngeal erythema.     Neck:  Neck normal without thyromegaly masses, asymmetry, normal tracheal structure, crepitus, and tenderness and no adenopathy.     Psychiatric:   She has a normal mood and affect. Her speech is normal and behavior is normal.     Neurological:   She is alert and oriented to person, place, and time. No cranial nerve deficit.     Skin:   No abrasions, lacerations, lesions, or rashes.       Procedure    Nasal endoscopy performed.  See procedure note.      Data Reviewed    WBC (K/uL)   Date Value   03/08/2019 11.08     Eosinophil% (%)   Date Value   03/08/2019 0.0     Eos # (K/uL)   Date Value   03/08/2019 0.0      Platelets (K/uL)   Date Value   03/08/2019 206     Glucose (mg/dL)   Date Value   03/08/2019 94     No results found for: IGE        Assessment:     1. Chronic rhinitis    2. Nasal obstruction    3. Nasal septal deviation    4. Hypertrophy of inferior nasal turbinate         Plan:     Continue Fluticasone 2 sprays each nostril once daily.  I ordered Azelastine 1 spray each nostril twice daily for rhinitis symptoms.  At this time she has not benefited from antihistamines or topical steroid for her nasal congestion.  I placed referral to Rhinology for nasal obstruction due to right septal deviation and inferior turbinate hypertrophy which I believe will require surgical intervention to improve nasal air flow.

## 2020-01-02 ENCOUNTER — OFFICE VISIT (OUTPATIENT)
Dept: OTOLARYNGOLOGY | Facility: CLINIC | Age: 35
End: 2020-01-02
Payer: COMMERCIAL

## 2020-01-02 VITALS
SYSTOLIC BLOOD PRESSURE: 118 MMHG | DIASTOLIC BLOOD PRESSURE: 70 MMHG | HEIGHT: 64 IN | WEIGHT: 163.69 LBS | BODY MASS INDEX: 27.95 KG/M2

## 2020-01-02 DIAGNOSIS — J34.2 NASAL SEPTAL DEVIATION: ICD-10-CM

## 2020-01-02 DIAGNOSIS — J31.0 CHRONIC RHINITIS: ICD-10-CM

## 2020-01-02 DIAGNOSIS — J34.89 NASAL OBSTRUCTION: ICD-10-CM

## 2020-01-02 DIAGNOSIS — J32.2 CHRONIC ETHMOIDAL SINUSITIS: Primary | ICD-10-CM

## 2020-01-02 DIAGNOSIS — J34.3 HYPERTROPHY OF INFERIOR NASAL TURBINATE: ICD-10-CM

## 2020-01-02 PROCEDURE — 31231 NASAL ENDOSCOPY DX: CPT | Mod: S$GLB,,, | Performed by: OTOLARYNGOLOGY

## 2020-01-02 PROCEDURE — 3008F BODY MASS INDEX DOCD: CPT | Mod: CPTII,S$GLB,, | Performed by: OTOLARYNGOLOGY

## 2020-01-02 PROCEDURE — 99214 OFFICE O/P EST MOD 30 MIN: CPT | Mod: 25,S$GLB,, | Performed by: OTOLARYNGOLOGY

## 2020-01-02 PROCEDURE — 31231 NASAL/SINUS ENDOSCOPY: ICD-10-PCS | Mod: S$GLB,,, | Performed by: OTOLARYNGOLOGY

## 2020-01-02 PROCEDURE — 99214 PR OFFICE/OUTPT VISIT, EST, LEVL IV, 30-39 MIN: ICD-10-PCS | Mod: 25,S$GLB,, | Performed by: OTOLARYNGOLOGY

## 2020-01-02 PROCEDURE — 3008F PR BODY MASS INDEX (BMI) DOCUMENTED: ICD-10-PCS | Mod: CPTII,S$GLB,, | Performed by: OTOLARYNGOLOGY

## 2020-01-02 RX ORDER — FLUTICASONE PROPIONATE 50 MCG
2 SPRAY, SUSPENSION (ML) NASAL DAILY
Qty: 16 G | Refills: 2 | Status: SHIPPED | OUTPATIENT
Start: 2020-01-02 | End: 2020-04-01

## 2020-01-02 RX ORDER — FLUTICASONE PROPIONATE 50 MCG
1 SPRAY, SUSPENSION (ML) NASAL DAILY
COMMUNITY
End: 2020-12-07

## 2020-01-02 NOTE — PROGRESS NOTES
Subjective:      Ganesh Schroeder is a 34 y.o. female who was referred to me by Kathrine Ovalle in consultation for nasal obstruction and sinusitis.  The patient was initially referred to Kathrine Ovalle NP, by Dr. Behram Khan in consultation for nasal congestion on September 9, 2019.  At that time she had presented with nasal congestion for 3 months. She reported having a sinus infection in May that was treated with oral antibiotics and Flonase without benefit, followed by treatment with oral prednisone with significant though temporary benefit. She was placed on appropriate regimen of medical therapy when initially evaluated including fluticasone and Zyrtec but this was without benefit upon re-evaluation at follow-up on December 26, 2019 when she reported continued nasal congestion that is worse at night when she lays down. Azelastine dosed at 1 spray each side twice daily was started at this last visit in addition to her existing medical regimen and she has used this for about 1 week now but has not noticed any benefit. She has not used nasal saline irrigations.    She rates the severity of her difficulty breathing through her nose (as graded from the NOSE scale) today as a moderate problem during the day and a severe problem at night.  Her nasal congestion and trouble sleeping she rates as a severe problem all of the time and her nasal blockage and inability to get air through her nose during exertion as a fairly bad problem. The nasal congestion makes her have to breathe through her mouth causing dry mouth and she also reports loud snoring at night which causes her to sleep in a separate room from her  often because of her nasal obstruction and noisy breathing.     She reports associated rhinorrhea, frequent sneezing, and post-nasal drip. She denies discolored green or yellow nasal discharge reports that the drainage is generally clear mucus material.  She constantly feels the need to blow her  nose which is productive of clear mucus.  She denies sinus pressure or pain associated with this and has not experienced associated epistaxis. She denies significant cough, throat pain, fever, chills, headache, nausea or vomiting.     She denies any antihistamine use. Current sinonasal medications as above.  She does not regularly use nasal decongestant sprays. She does not recall previously having allergy testing. She relates a history of asthma as a child but without any significant symptoms currently and no use of medication for this. She denies a history of reflux symptoms.  She has not previously had an EGD. She denies have a diagnosis of obstructive sleep apnea. She has not had sinonasal surgery. She does not recall a prior history of nasal trauma.    SNOT-22 score = 53, NOSE score = 18 (at night), 16 (during day)    Global QOL = 50%      Past Medical History  She has a past medical history of Abnormal Pap smear of cervix.    Past Surgical History  She has a past surgical history that includes Pelvic laparoscopy; Salpingectomy (Right, );  section (2010, 2014); Umbilical hernia repair (2016); and Robot-assisted repair of ventral hernia using da Lora Xi (N/A, 3/7/2019).    Family History  Her family history includes No Known Problems in her child, child, father, maternal grandfather, maternal grandmother, mother, paternal grandfather, paternal grandmother, and sister.    Social History  She reports that she has never smoked. She has never used smokeless tobacco. She reports that she does not drink alcohol or use drugs.    Allergies  She has No Known Allergies.    Medications   She has a current medication list which includes the following prescription(s): azelastine, fluticasone propionate, cetirizine, and fluticasone propionate.    Review of Systems  Review of Systems   Constitutional: Negative.    HENT: Positive for postnasal drip, rhinorrhea, sinus pressure and sore throat.  "Negative for hoarse voice.         Snoring   Eyes: Negative.    Respiratory: Positive for cough.    Cardiovascular: Negative.    Gastrointestinal: Negative.    Endocrine: Negative.    Genitourinary: Negative.    Musculoskeletal: Negative.    Skin: Negative.    Allergic/Immunologic: Negative.         Seasonal Allergies   Neurological: Positive for headaches.   Hematological: Negative.    Psychiatric/Behavioral: Positive for sleep disturbance.     Objective:     /70 (BP Location: Right arm, Patient Position: Sitting, BP Method: Small (Manual))   Ht 5' 4" (1.626 m)   Wt 74.2 kg (163 lb 11.1 oz)   BMI 28.10 kg/m²        Constitutional:   Vital signs are normal. She appears well-developed and well-nourished. She does not have a sickly appearance. She does not appear ill. No distress. Abnormal speech (She has a hypo nasal speech quality consistent with her bilateral nasal obstruction).  No hoarse voice, breathy voice and strained voice.      Head:  Normocephalic and atraumatic. Head is without TMJ tenderness. No skin lesions. Salivary glands normal.  Facial strength is normal.      Ears:  Hearing normal to normal and whispered voice; external ear normal without scars, lesions, or masses; ear canal, tympanic membrane, and middle ear normal..   Right Ear: No drainage, swelling or tenderness. Tympanic membrane is not injected, not scarred, not perforated, not erythematous, not retracted and not bulging. No middle ear effusion. No decreased hearing is noted.   Left Ear: No drainage, swelling or tenderness. Tympanic membrane is not injected, not scarred, not perforated, not erythematous, not retracted and not bulging.  No middle ear effusion. No decreased hearing is noted.     Nose:  Mucosal edema, rhinorrhea and septal deviation present. No sinus tenderness, nasal septal hematoma or polyps. No epistaxis.  No foreign bodies. Turbinate hypertrophy.  Right sinus exhibits no maxillary sinus tenderness and no frontal " sinus tenderness. Left sinus exhibits no maxillary sinus tenderness and no frontal sinus tenderness.     Mouth/Throat  Oropharynx clear and moist without lesions or asymmetry, normal uvula midline and lips, teeth, and gums normal. No uvula swelling, oral lesions or trismus. No oropharyngeal exudate, posterior oropharyngeal edema or posterior oropharyngeal erythema.     Neck:  Neck normal without thyromegaly masses, asymmetry, normal tracheal structure, crepitus, and tenderness, thyroid normal, trachea normal, phonation normal, full range of motion with neck supple and no adenopathy. No edema, no erythema, no stridor and no neck rigidity present.     Cardiovascular:   Normal rate, regular rhythm and normal pulses.      Pulmonary/Chest:   Effort normal. No stridor. No apnea. No respiratory distress.     Psychiatric:   She has a normal mood and affect. Her speech is abnormal (She has a hypo nasal speech quality consistent with her bilateral nasal obstruction) and behavior is normal.     Neurological:   She has neurological normal, alert and oriented. No cranial nerve deficit or sensory deficit. Coordination and gait normal.     Skin:   No abrasions, lacerations, lesions, or rashes.       Procedure    Nasal endoscopy performed.  See procedure note.  Overall there was significant mucosal edema bilaterally with near complete obstruction of the bilateral nasal passages due to multiple factors including inferior turbinate hypertrophy, septal deviation, diffuse mucosal edema, and narrowing at the area of the internal nasal valve bilaterally from all of these sources.  There is also finding of thick mucoid drainage bilaterally which appeared in trapped by obstructed paranasal sinuses as increased drainage of this material resulted following application of topical decongestant.  There were no nasal polyps or saturnino purulent drainage identified though examination was limited by narrow nasal cavities bilaterally and the  significant diffuse mucosal edema present.  No other masses, lesions, or concerning findings were identified.    Left              Right                          Data Reviewed    WBC (K/uL)   Date Value   03/08/2019 11.08     Eosinophil% (%)   Date Value   03/08/2019 0.0     Eos # (K/uL)   Date Value   03/08/2019 0.0     Platelets (K/uL)   Date Value   03/08/2019 206     Glucose (mg/dL)   Date Value   03/08/2019 94     No results found for: IGE     Past medical records were reviewed with data pertinent to the chief complaint summarized in the HPI. Data was obtained from records including specifically, the documentation from Kathrine Ovalle NP, on 12/26/19 and on 9/9/19 which was personally reviewed and describes the patient's initial evaluation on 9/9 and subsequent management and evaluation which prompted referral to me at the visit on 12/26.      No sinus imaging available.       Assessment:     1. Chronic ethmoidal sinusitis    2. Chronic rhinitis    3. Nasal obstruction    4. Hypertrophy of inferior nasal turbinate    5. Nasal septal deviation         Plan:     I had a long discussion with the patient regarding her condition and the further workup and management options.  Given findings on her examination including diagnostic nasal endoscopy performed today I have advised obtaining a CT scan as there is significant bilateral mucosal edema and obstructed mucoid drainage present without complete evaluation of the paranasal sinuses given the multiple sources of partial nasal obstruction and no history of prior sinonasal surgery. While there is indication for nasal turbinate reduction and septoplasty to improve her nasal airway and relieve these sources of partial nasal obstruction, these findings alone do not explain the significant degree of mucosal edema and mucoid drainage present bilaterally, therefore I feel it prudent to fully evaluate with a sinus CT before making decisions regarding potential sinonasal  surgery. Well no nasal polyps were specifically identified nor purulent drainage on endoscopy examination was limited and given the degree of inflammation present I would not be surprised by presence of additional sinonasal inflammatory disease. It may also be that her degree of partial nasal obstruction and congestion is presenting access of topical therapies which may otherwise provide adequate relief of her symptoms related to sinonasal inflammation. With her current degree of nasal congestion and obstruction it is hard to imagine any of her prescribed therapies reach more than the most anterior nasal mucosa within the nasal vestibule and heads of the inferior turbinates and anterior nasal septum.     In the meantime while obtaining her CT scan, I have recommended continued medical management with a combination of nasal saline irrigations,  inhaled nasal steroids (fluticasone), and azelastine which she recently started.  In order to ensure optimal use of these topical therapies despite her significant nasal congestion and partial obstruction I provided education on the use of these medications today. Explanation was given regarding saline irrigations, and also provided as written instructions. Fluticasone nasal spray was prescribed, and azelastine was continued; specific instructions also given on proper use to maximize nasal delivery of the medication. I emphasized the importance of daily use to achieve therapeutic effect. Written instructions regarding the use of fluticasone and azelastine nasal spray were also provided.    Follow up in about 1 month (around 2/2/2020).

## 2020-01-02 NOTE — PATIENT INSTRUCTIONS
"Information and instructions from your visit with me today:    · Start using fluticasone nasal spray:    This medication is the same as the Flonase brand nasal spray. Use this medication as instructed on the prescription, 2 sprays on each side of your nose once daily. You need to use this medication every day regardless of symptoms, as it takes time to work and get the benefits. It does not work on an "as needed" basis like taking a decongestant. Place the tip of the medication bottle in your nose and aim slightly up and out on each side to get medication high and deep into your nose and sinuses, and not have it all deposit in the very front of your nose. You can imagine aiming towards the back of your eyeball on each side for this, as opposed to straight back to the center of your nose and head.     · Start using azelastine nasal spray:    This medication is an antihistamine used to treat nasal symptoms of allergy, which works specifically in the nose unlike antihistamine pills which have more of an effect on the while body. Use this medication as instructed on the prescription, 1 spray on each side of your nose twice daily. You need to use this medication every day to prevent symptoms. If your symptoms only occur in a particular season, then the medication can be used seasonally instead of year long. Place the tip of the medication bottle in your nose and aim slightly up and out on each side to get medication high and deep into your nose and sinuses, imagine aiming towards the back of your eyeball on each side for this, just like the fluticasone nasal spray.    · Start nasal irrigations with saline solution:    SALINE SINUS RINSE (Omega Med brand): You should do a full bottle, half on one side of your nose and half on the other, 1-2 times per day (or more if able to, you cannot do this too much). Follow the instructions on the box: mix the salt packet with clean water (bottle, previously boiled, distilled, etc -- " not tap water) to the line on the bottle to make the irrigation.      · Do not use nasal decongestant sprays such as Afrin or similar products.    It was nice meeting you today, and I look forward to helping you feel better soon. Please don't hesitate to call if you have any other questions or concerns, or if I can be of any assistance in the meantime.       [facility]    Phone  [phone]    Fax      [phone]        Chuck Quintero MD  Rhinology, Sinus, and Skull Base Surgery  Department of Otorhinolaryngology

## 2020-01-02 NOTE — LETTER
January 2, 2020      Kathrine Ovalle, NP  [address]  [facility] [address]           [facility]  [address]   [place] [address]  Phone: [phone]          Patient: Ganesh Schroeder   MR Number: [id number]   YOB: 1985   Date of Visit: 1/2/2020       Dear Kathrine Ovalle:    Thank you for referring Ganesh Schroeder to me for evaluation. Attached you will find relevant portions of my assessment and plan of care.    If you have questions, please do not hesitate to call me. I look forward to following Ganesh Schroeder along with you.    Sincerely,    Chuck Quintero MD    Enclosure  CC:  No Recipients    If you would like to receive this communication electronically, please contact [email or web address] or [phone] to request more information on [employer] access.    For providers and/or their staff who would like to refer a patient to [facility], please contact us through our one-stop-shop provider referral line, [facility], at [phone].    If you feel you have received this communication in error or would no longer like to receive these types of communications, please e-mail [email or web address]

## 2020-01-03 NOTE — PROCEDURES
Nasal/sinus endoscopy  Date/Time: 1/2/2020 9:00 AM  Performed by: Chuck Quintero MD  Authorized by: Chuck Quintero MD     Consent Done?:  Yes (Verbal)  Anesthesia:     Local anesthetic:  4% Xylocaine spray with Rohit-Synephrine    Patient tolerance:  Patient tolerated the procedure well with no immediate complications  Nose:     Procedure Performed:  Nasal Endoscopy  External:      No external nasal deformity  Intranasal:      Mucosa no polyps     Mucosa ulcers not present     Mucosa lesions present (Diffuse mucosal edema is present throughout the nasal cavities and visualized paranasal sinuses)     Enlarged turbinates     Septum gross deformity ( septal deviation is present primarily impacting the left [facility])  Nasopharynx:      No mucosa lesions     Adenoids not present     Posterior choanae patent     Eustachian tube patent     Overall there was significant mucosal edema bilaterally with near complete obstruction of the bilateral nasal passages due to multiple factors including inferior turbinate hypertrophy, septal deviation, diffuse mucosal edema, and narrowing at the area of the internal nasal valve bilaterally from all of these sources.  There is also finding of thick mucoid drainage bilaterally which appeared in trapped by obstructed paranasal sinuses as increased drainage of this material resulted following application of topical decongestant.  There were no nasal polyps or saturnino purulent drainage identified though examination was limited by narrow nasal cavities bilaterally and the significant diffuse mucosal edema present.  No other masses, lesions, or concerning findings were identified. There is no evidence of prior sinus surgery or sinonasal procedures based on examination.    See note from same date for representative images from this examination which illustrate carmona findings described above.

## 2020-01-27 ENCOUNTER — TELEPHONE (OUTPATIENT)
Dept: OTOLARYNGOLOGY | Facility: CLINIC | Age: 35
End: 2020-01-27

## 2020-01-27 NOTE — TELEPHONE ENCOUNTER
Called Patient and no answer, left message for Patient to call me back regarding a CT that Dr. Quintero ordered.  The financial dept at [facility] has been trying to reach this Patient due to a financial obligation.

## 2020-01-28 ENCOUNTER — HOSPITAL ENCOUNTER (OUTPATIENT)
Dept: RADIOLOGY | Facility: HOSPITAL | Age: 35
Discharge: HOME | End: 2020-01-28
Attending: OTOLARYNGOLOGY
Payer: COMMERCIAL

## 2020-01-28 DIAGNOSIS — J32.2 CHRONIC ETHMOIDAL SINUSITIS: ICD-10-CM

## 2020-01-28 DIAGNOSIS — J31.0 CHRONIC RHINITIS: ICD-10-CM

## 2020-01-28 DIAGNOSIS — J34.89 NASAL OBSTRUCTION: ICD-10-CM

## 2020-01-28 PROCEDURE — 70486 CT MAXILLOFACIAL W/O DYE: CPT | Mod: 26,,, | Performed by: RADIOLOGY

## 2020-01-28 PROCEDURE — 70486 CT MEDTRONIC SINUSES WITHOUT: ICD-10-PCS | Mod: 26,,, | Performed by: RADIOLOGY

## 2020-01-28 PROCEDURE — 70486 CT MAXILLOFACIAL W/O DYE: CPT | Mod: TC

## 2020-02-20 ENCOUNTER — OFFICE VISIT (OUTPATIENT)
Dept: OTOLARYNGOLOGY | Facility: CLINIC | Age: 35
End: 2020-02-20
Payer: COMMERCIAL

## 2020-02-20 VITALS
DIASTOLIC BLOOD PRESSURE: 60 MMHG | WEIGHT: 163 LBS | HEIGHT: 64 IN | SYSTOLIC BLOOD PRESSURE: 90 MMHG | BODY MASS INDEX: 27.83 KG/M2

## 2020-02-20 DIAGNOSIS — J34.89 NASAL OBSTRUCTION: Primary | ICD-10-CM

## 2020-02-20 DIAGNOSIS — J32.1 CHRONIC FRONTAL SINUSITIS: ICD-10-CM

## 2020-02-20 DIAGNOSIS — J32.2 CHRONIC ETHMOIDAL SINUSITIS: ICD-10-CM

## 2020-02-20 DIAGNOSIS — J34.3 HYPERTROPHY OF INFERIOR NASAL TURBINATE: ICD-10-CM

## 2020-02-20 DIAGNOSIS — J34.2 NASAL SEPTAL DEVIATION: ICD-10-CM

## 2020-02-20 DIAGNOSIS — J31.0 CHRONIC RHINITIS: ICD-10-CM

## 2020-02-20 DIAGNOSIS — J34.89 CONCHA BULLOSA: ICD-10-CM

## 2020-02-20 DIAGNOSIS — J32.0 CHRONIC MAXILLARY SINUSITIS: ICD-10-CM

## 2020-02-20 DIAGNOSIS — K04.8 DENTAL CYST: ICD-10-CM

## 2020-02-20 PROCEDURE — 99215 OFFICE O/P EST HI 40 MIN: CPT | Mod: S$GLB,,, | Performed by: OTOLARYNGOLOGY

## 2020-02-20 PROCEDURE — 3008F PR BODY MASS INDEX (BMI) DOCUMENTED: ICD-10-PCS | Mod: CPTII,S$GLB,, | Performed by: OTOLARYNGOLOGY

## 2020-02-20 PROCEDURE — 3008F BODY MASS INDEX DOCD: CPT | Mod: CPTII,S$GLB,, | Performed by: OTOLARYNGOLOGY

## 2020-02-20 PROCEDURE — 99215 PR OFFICE/OUTPT VISIT, EST, LEVL V, 40-54 MIN: ICD-10-PCS | Mod: S$GLB,,, | Performed by: OTOLARYNGOLOGY

## 2020-02-20 RX ORDER — LIDOCAINE HYDROCHLORIDE 10 MG/ML
1 INJECTION, SOLUTION EPIDURAL; INFILTRATION; INTRACAUDAL; PERINEURAL ONCE
Status: CANCELLED | OUTPATIENT
Start: 2020-02-20 | End: 2020-02-20

## 2020-02-20 RX ORDER — DEXAMETHASONE SODIUM PHOSPHATE 4 MG/ML
8 INJECTION, SOLUTION INTRA-ARTICULAR; INTRALESIONAL; INTRAMUSCULAR; INTRAVENOUS; SOFT TISSUE
Status: CANCELLED | OUTPATIENT
Start: 2020-02-20

## 2020-02-20 RX ORDER — OXYMETAZOLINE HCL 0.05 %
2 SPRAY, NON-AEROSOL (ML) NASAL
Status: CANCELLED | OUTPATIENT
Start: 2020-02-20

## 2020-02-20 RX ORDER — LIDOCAINE HYDROCHLORIDE 10 MG/ML
1 INJECTION, SOLUTION EPIDURAL; INFILTRATION; INTRACAUDAL; PERINEURAL ONCE
Status: DISCONTINUED | OUTPATIENT
Start: 2020-02-20 | End: 2020-02-25

## 2020-02-20 NOTE — PROGRESS NOTES
"  Subjective:      Ganesh is a 34 y.o. female who comes for follow-up of sinusitis.  Her last visit with me was on 1/2/2020 and that prior history is copied below in reference to today's update. Since this last evaluation, significant changes in symptoms have included very little benefit with adequate medical management. She has had some mild improvements in congestion with use of nasal steroids and saline irrigations. She denies any new abnormal discharge, pain, or epistaxis. There have been no recent acute episodes of worsening since our last evaluation, and no required therapies for acute exacerbations. She has been using these therapies appropriately as previously instructed. She obtained the sinus CT I previously ordered, and we reviewed this imaging today (see below).    SNOT-22 score = 32  NOSE score = 11  Global QOL assessment ("overall, how do you feel today?" from 1 "very bad" to 10 "very good"): 7    History from prior evaluation 1/2/2020 which this current visit is in follow-up to, is copied below for reference with all changes and updates as described above:  History and Subjective Data 1/2/2020:   Ganesh Schroeder is a 34 y.o. female who was referred to me by Kathrine Ovalle in consultation for nasal obstruction and sinusitis.  The patient was initially referred to Kathrine Ovalle, PATRICIA, by Dr. Behram Khan in consultation for nasal congestion on September 9, 2019.  At that time she had presented with nasal congestion for 3 months. She reported having a sinus infection in May that was treated with oral antibiotics and Flonase without benefit, followed by treatment with oral prednisone with significant though temporary benefit. She was placed on appropriate regimen of medical therapy when initially evaluated including fluticasone and Zyrtec but this was without benefit upon re-evaluation at follow-up on December 26, 2019 when she reported continued nasal congestion that is worse at night when she lays " down. Azelastine dosed at 1 spray each side twice daily was started at this last visit in addition to her existing medical regimen and she has used this for about 1 week now but has not noticed any benefit. She has not used nasal saline irrigations.     She rates the severity of her difficulty breathing through her nose (as graded from the NOSE scale) today as a moderate problem during the day and a severe problem at night.  Her nasal congestion and trouble sleeping she rates as a severe problem all of the time and her nasal blockage and inability to get air through her nose during exertion as a fairly bad problem. The nasal congestion makes her have to breathe through her mouth causing dry mouth and she also reports loud snoring at night which causes her to sleep in a separate room from her  often because of her nasal obstruction and noisy breathing.      She reports associated rhinorrhea, frequent sneezing, and post-nasal drip. She denies discolored green or yellow nasal discharge reports that the drainage is generally clear mucus material.  She constantly feels the need to blow her nose which is productive of clear mucus.  She denies sinus pressure or pain associated with this and has not experienced associated epistaxis. She denies significant cough, throat pain, fever, chills, headache, nausea or vomiting.      She denies any antihistamine use. Current sinonasal medications as above.  She does not regularly use nasal decongestant sprays. She does not recall previously having allergy testing. She relates a history of asthma as a child but without any significant symptoms currently and no use of medication for this. She denies a history of reflux symptoms.  She has not previously had an EGD. She denies have a diagnosis of obstructive sleep apnea. She has not had sinonasal surgery. She does not recall a prior history of nasal trauma.     SNOT-22 score = 53, NOSE score = 18 (at night), 16 (during  "day)     Global QOL = 50%    Review of Systems:  A detailed review of systems was obtained with pertinent positives, and pertinent changes from last review of systems obtained on 2020, as per the above HPI.    New ROS performed today:  Review of Systems   Constitutional: Negative.    HENT: Positive for postnasal drip, rhinorrhea and sinus pressure. Negative for hoarse voice.    Eyes: Negative.    Respiratory: Negative.         Snoring   Cardiovascular: Negative.    Gastrointestinal: Negative.    Endocrine: Negative.    Genitourinary: Negative.    Musculoskeletal: Negative.    Skin: Negative.    Allergic/Immunologic: Negative.    Neurological: Positive for headaches.   Hematological: Negative.    Psychiatric/Behavioral: Negative.        Past Medical History:  She has a past medical history of Abnormal Pap smear of cervix.    Past Surgical History:  She has a past surgical history that includes Pelvic laparoscopy; Salpingectomy (Right, );  section (2010, 2014); Umbilical hernia repair (2016); and Robot-assisted repair of ventral hernia using da Lora Xi (N/A, 3/7/2019).    Allergies:  She has No Known Allergies.    Medications:  She has a current medication list which includes the following prescription(s): fluticasone propionate, fluticasone propionate, azelastine, and cetirizine, and the following Facility-Administered Medications: lidocaine (pf) 10 mg/ml (1%).      Objective:     BP 90/60 (BP Location: Right arm, Patient Position: Sitting, BP Method: Small (Manual))   Ht 5' 4" (1.626 m)   Wt 73.9 kg (163 lb)   BMI 27.98 kg/m²        Constitutional:   Vital signs are normal. She appears well-developed and well-nourished. She does not have a sickly appearance. She does not appear ill. No distress. Abnormal speech (She has a hypo nasal speech quality consistent with her bilateral nasal obstruction).  No hoarse voice, breathy voice and strained voice.      Head:  Normocephalic and " atraumatic. Head is without TMJ tenderness. No skin lesions. Salivary glands normal.  Facial strength is normal.      Ears:  Hearing normal to normal and whispered voice; external ear normal without scars, lesions, or masses; ear canal, tympanic membrane, and middle ear normal..   Right Ear: No drainage, swelling or tenderness. Tympanic membrane is not injected, not scarred, not perforated, not erythematous, not retracted and not bulging. No middle ear effusion. No decreased hearing is noted.   Left Ear: No drainage, swelling or tenderness. Tympanic membrane is not injected, not scarred, not perforated, not erythematous, not retracted and not bulging.  No middle ear effusion. No decreased hearing is noted.     Nose:  Mucosal edema, rhinorrhea and septal deviation present. No sinus tenderness, nasal septal hematoma or polyps. No epistaxis.  No foreign bodies. Turbinate hypertrophy.  Right sinus exhibits no maxillary sinus tenderness and no frontal sinus tenderness. Left sinus exhibits no maxillary sinus tenderness and no frontal sinus tenderness.     Mouth/Throat  Oropharynx clear and moist without lesions or asymmetry, normal uvula midline and lips, teeth, and gums normal. No uvula swelling, oral lesions or trismus. No oropharyngeal exudate, posterior oropharyngeal edema or posterior oropharyngeal erythema.     Neck:  Neck normal without thyromegaly masses, asymmetry, normal tracheal structure, crepitus, and tenderness, thyroid normal, trachea normal, phonation normal, full range of motion with neck supple and no adenopathy. No edema, no erythema, no stridor and no neck rigidity present.     Cardiovascular:   Normal rate, regular rhythm, S1 normal, S2 normal, normal heart sounds, normal pulses and rate and rhythm, heart sounds, and pulses normal.    No murmur heard.    Pulmonary/Chest:   Effort normal, breath sounds normal and effort and breath sounds normal. No stridor. No apnea. No respiratory distress. She has no  decreased breath sounds. She has no wheezes.     Psychiatric:   She has a normal mood and affect. Her speech is abnormal (She has a hypo nasal speech quality consistent with her bilateral nasal obstruction) and behavior is normal.     Neurological:   She has neurological normal, alert and oriented. No cranial nerve deficit or sensory deficit. Coordination and gait normal.     Skin:   No abrasions, lacerations, lesions, or rashes.       Procedure    None    Data Reviewed    WBC (K/uL)   Date Value   03/08/2019 11.08     Eosinophil% (%)   Date Value   03/08/2019 0.0     Eos # (K/uL)   Date Value   03/08/2019 0.0     Platelets (K/uL)   Date Value   03/08/2019 206     Glucose (mg/dL)   Date Value   03/08/2019 94     No results found for: IGE     Past medical records were reviewed with data pertinent to the chief complaint summarized in the HPI. Data was obtained from records including specifically, the documentation from Kathrine Ovalle NP, on 12/26/19 and on 9/9/19 which was personally reviewed and describes the patient's initial evaluation on 9/9 and subsequent management and evaluation which prompted referral to me at the visit on 12/26.      Review of radiographic imaging:I personally reviewed and interpreted imaging relative to the chief complaint consisting of sinus CT performed on 1/28/2020.  Key findings from this imaging study include findings of sinusitis, partial nasal obstruction from isaias bullosa, turbinate hypertrophy,, and biphasic septal widening from septal swell body hypertrophy.  Representative images selected from this study and demonstrating key findings are copied below:       Small incidental cyst above right maxillary canine.      Middle turbinate isaias bullosa bilateral. Turbinate hypertrophy with chronic inflammatory changes/degeneration of conchal bone. Left Maxillary mucous retention cyst.      Superior turbinate isaias bullosa bilateral. Turbinate hypertrophy with chronic inflammatory  changes/degeneration of conchal bone. Maxillary mucosal thickening.        Ethmoid mucosal thickening and partial opacifications extending to frontal recess and inferior frontals, with osteitic thickened frontal beak.    Assessment:     1. Nasal obstruction    2. Chronic ethmoidal sinusitis    3. Chronic frontal sinusitis    4. Chronic maxillary sinusitis    5. Chronic rhinitis    6. Hypertrophy of inferior nasal turbinate    7. Nasal septal deviation    8. Isaias bullosa    9. Dental cyst         Plan:     We discussed all issues and options for management regarding these diagnoses today. The risks, benefits and alternatives for management options were discussed and questions answered. She has elected to proceed with sinonasal surgery, in addition to ongoing medical therapy.    She would benefit from endoscopic sinus surgery for the treatment of her condition.  This would include the ethmoid, maxillary and frontal sinuses and would be bilateral. Drew MOON frontals anticipated.  Inferior turbinate reduction would be included, as well as bilateral isaias bullosa resection of middle turbinate isaias bullosa as well as small focal resection of superior turbinate isaias bullosa during focused posterior ethmoidectomy. Limited septoplasty to extent necessary to address septal swell body was discussed as a possibility as well. I discussed the risks, benefits and alternatives to surgery with the patient, as well as the expected postoperative course.  I gave her the opportunity to ask questions and I answered all of them.  I provided relevant printed information on her condition for her to review at home.  Same-day discharge is anticipated.  She will need evaluation in the pre-anesthesia clinic.   The surgery will be tentatively scheduled for 4/21/2020.  She will need to return for a postoperative visit 1, 2, and 3 weeks after surgery. Ongoing medical management planned with nasal saline irrigations and steroid spray.  Observation planned regarding small incidental dental cyst seen on sinus CT.     She voiced understanding of this discussion and instructions for management, and all of her questions were answered. I have recommended follow-up for post-operative evaluation after planned procedures and this has been arranged and scheduled appropriately for the anticipated date of surgery. First post-op scheduled for 4/29. I have encouraged her to call for any questions or concerns in the meantime.     Over 40 minutes were spent on this encounter with over half this time spent in face-to-face evaluation and in counseling and coordination of care. This was necessary to adequately address all elements of her concerns and provide appropriate explanation and counseling. This specifically included detailed discussion of recent imaging, exam findings, recommended surgical procedures/treatments, and counseling regarding the surgical procedures described above. A plan was formulated based on this thorough and detailed discussion with the patient and in consideration of all aspects of past therapies and goals for care.    Chuck Quintero MD    Orders Placed This Encounter   Procedures    Case Request Operating Room: SEPTOPLASTY, NOSE, ENDOSCOPIC, EXCISION, NASAL TURBINATE, SUBMUCOSAL, FESS, USING COMPUTER-ASSISTED NAVIGATION, ENDOSCOPY, NOSE, WITH BALBINA BULLOSA RESECTION           Chuck Quintero MD    Rhinology, Allergy, and Sinus-Skull Base Surgery    Department of Otorhinolaryngology    [facility]    Phone  [phone]    Fax      [phone]

## 2020-03-23 ENCOUNTER — TELEPHONE (OUTPATIENT)
Dept: OTOLARYNGOLOGY | Facility: CLINIC | Age: 35
End: 2020-03-23

## 2020-03-23 NOTE — TELEPHONE ENCOUNTER
Patient has surgery scheduled for 4/21/2020 with Dr. Quintero for Septo/Turb/Fess/CB. Tried calling patient to inform her of the cancellation due to the COVID-19, no answer. Left message for patient to call the office back.

## 2020-03-25 ENCOUNTER — OFFICE VISIT (OUTPATIENT)
Dept: OTOLARYNGOLOGY | Facility: CLINIC | Age: 35
End: 2020-03-25
Payer: COMMERCIAL

## 2020-03-25 ENCOUNTER — PATIENT MESSAGE (OUTPATIENT)
Dept: OTOLARYNGOLOGY | Facility: CLINIC | Age: 35
End: 2020-03-25

## 2020-03-25 DIAGNOSIS — J34.89 NASAL OBSTRUCTION: ICD-10-CM

## 2020-03-25 DIAGNOSIS — J34.2 NASAL SEPTAL DEVIATION: ICD-10-CM

## 2020-03-25 DIAGNOSIS — J32.2 CHRONIC ETHMOIDAL SINUSITIS: ICD-10-CM

## 2020-03-25 DIAGNOSIS — J01.41 ACUTE RECURRENT PANSINUSITIS: Primary | ICD-10-CM

## 2020-03-25 DIAGNOSIS — J34.3 HYPERTROPHY OF INFERIOR NASAL TURBINATE: ICD-10-CM

## 2020-03-25 DIAGNOSIS — J32.1 CHRONIC FRONTAL SINUSITIS: ICD-10-CM

## 2020-03-25 DIAGNOSIS — J32.0 CHRONIC MAXILLARY SINUSITIS: ICD-10-CM

## 2020-03-25 PROCEDURE — 99214 OFFICE O/P EST MOD 30 MIN: CPT | Mod: 95,,, | Performed by: OTOLARYNGOLOGY

## 2020-03-25 PROCEDURE — 99214 PR OFFICE/OUTPT VISIT, EST, LEVL IV, 30-39 MIN: ICD-10-PCS | Mod: 95,,, | Performed by: OTOLARYNGOLOGY

## 2020-03-25 RX ORDER — PREDNISONE 10 MG/1
TABLET ORAL
Qty: 24 TABLET | Refills: 0 | Status: SHIPPED | OUTPATIENT
Start: 2020-03-25 | End: 2020-12-07 | Stop reason: ALTCHOICE

## 2020-03-25 RX ORDER — AMOXICILLIN AND CLAVULANATE POTASSIUM 875; 125 MG/1; MG/1
1 TABLET, FILM COATED ORAL 2 TIMES DAILY
Qty: 28 TABLET | Refills: 0 | Status: SHIPPED | OUTPATIENT
Start: 2020-03-25 | End: 2020-04-08

## 2020-03-25 NOTE — PROGRESS NOTES
Subjective:      Ganesh is a 34 y.o. female who comes for follow-up of sinusitis and due to acute worsening symptoms of sinusitis. The patient's virtual visit today was conducted via telemedicine (video visit) as indicated to reduce risk of transmission of Covid-19 given the current pandemic and safety measures related to this. Her last visit with me was on 2/20/2020. Since this last evaluation, significant changes in symptoms have included acute worsening of symptoms at the end of last week. Sneezing, nose running a lot. No significant cough just mild intermittent. No fevers/chills. No known Covid-19 contacts/exposures. Had yellowish drainage a few days ago now more clear. Facial pain and pressure in midface and in forehead. No change in sense of smell/no anosmia. No significant ear pain or drainage. No specific treatment yet for this. Started taking tylenol for headache pain a few days ago. Has been using flonase and azelastine. Despite these therapies symptoms have worsened and are not improving.      History from prior evaluation 2/20/2020 which this current visit is in follow-up to, is copied below for reference with all changes and updates as described above:  Ganesh is a 34 y.o. female who comes for follow-up of sinusitis.  Her last visit with me was on 1/2/2020 and that prior history is copied below in reference to today's update. Since this last evaluation, significant changes in symptoms have included very little benefit with adequate medical management. She has had some mild improvements in congestion with use of nasal steroids and saline irrigations. She denies any new abnormal discharge, pain, or epistaxis. There have been no recent acute episodes of worsening since our last evaluation, and no required therapies for acute exacerbations. She has been using these therapies appropriately as previously instructed. She obtained the sinus CT I previously ordered, and we reviewed this imaging today (see  below).    Review of Systems:  A detailed review of systems was obtained with pertinent positives, and pertinent changes from last review of systems obtained on 2020, as per the above HPI.    New ROS performed today:  Review of Systems   Constitutional: Negative.  Negative for chills and fever.   HENT: Positive for congestion, postnasal drip, rhinorrhea, sinus pressure, sinus pain and sneezing. Negative for ear discharge, nosebleeds and trouble swallowing.    Eyes: Negative.  Negative for pain and visual disturbance.   Respiratory: Negative.  Negative for shortness of breath and stridor.         Snoring   Cardiovascular: Negative.  Negative for chest pain.   Gastrointestinal: Negative.  Negative for nausea and vomiting.   Endocrine: Negative.    Genitourinary: Negative.    Musculoskeletal: Negative.  Negative for neck pain and neck stiffness.   Skin: Negative.  Negative for rash.   Allergic/Immunologic: Negative.  Negative for environmental allergies, food allergies and immunocompromised state.   Neurological: Positive for headaches.   Hematological: Negative.  Negative for adenopathy. Does not bruise/bleed easily.   Psychiatric/Behavioral: Negative.        Past Medical History:  She has a past medical history of Abnormal Pap smear of cervix.    Past Surgical History:  She has a past surgical history that includes Pelvic laparoscopy; Salpingectomy (Right, );  section (2010, 2014); Umbilical hernia repair (2016); and Robot-assisted repair of ventral hernia using da Lora Xi (N/A, 3/7/2019).    Allergies:  She has No Known Allergies.    Medications:  She has a current medication list which includes the following prescription(s): amoxicillin-clavulanate 875-125mg, azelastine, cetirizine, fluticasone propionate, fluticasone propionate, and prednisone.      Objective:     Constitutional:   She is oriented to person, place, and time. She appears well-developed and well-nourished. She is  cooperative. She does not have a sickly appearance, appear ill, or demonstrate any distress. Normal speech without hoarse, breathy, or strained voice.    Head:  Normocephalic and atraumatic to visual inspection by video visit. Head is without abrasion, contusion or laceration. Hair is normal. No scars or skin lesions. Facial strength is normal and symmetric.  Ears:  Right Ear: Auricle normal in appearance and position without proptosis. No lacerations. No drainage or swelling. No decreased hearing is noted.   Left Ear: Auricle normal in appearance and position without proptosis. No lacerations. No drainage or swelling. No decreased hearing is noted.   Eyes:  Sclera are clear bilaterally, pupils are equal and round, and there is no discharge or drainage present. No resting nystagmus present. Lid position appears normal and symmetric. Globe positioning appears grossly symmetric without enophthalmos or exophthalmos.   Nose:  External nose is normal without gross asymmetry or deformity. Nasal skin is normal. The columella and nasal tip are straight without significant deviation. No anterior rhinorrhea, epistaxis, or abnormal findings in the nasal vestibules/anterior nares. No reported sinus tenderness. Turbinates and mucosal edema/inflammation unable to assess (video visit). Nasal breathing grossly intact without mouth breathing and or significant hyponasal voice quality.  Mouth/Throat  Normal dentition, lips, and oral movement. Normal oral competence and ability to manage secretions with intact swallow.  Neck:  Trachea normal appearance midline, phonation normal and full range of motion of neck. No visible neck masses or swelling, and no apparent edema or erythema. There is no audible stridor.   Pulmonary/Chest:   Effort normal. No audible stridor or wheeze. No apnea, respiratory distress, or shortness of breath.   Psychiatric:   She has a normal mood and affect. Her speech is normal and behavior is normal.    Neurological:   She is neurologically normal, alert, and oriented to person, place, and time. No cranial nerve deficit (of facial expression, mastication, extraoccular movement, tongue movement, or pupillary function - by video visit assessment) or sensory deficit (by patient reported, as not physically evaluated by palpation due to video visit). Coordination of movements is grossly normal and there is no tremor present.  Skin:   No abrasions, lacerations, lesions, or rashes of visible skin.     Procedure     None    Data Reviewed    WBC (K/uL)   Date Value   03/08/2019 11.08     Eosinophil% (%)   Date Value   03/08/2019 0.0     Eos # (K/uL)   Date Value   03/08/2019 0.0     Platelets (K/uL)   Date Value   03/08/2019 206     Glucose (mg/dL)   Date Value   03/08/2019 94     No results found for: IGE    Past medical records were reviewed with data pertinent to the chief complaint summarized in the HPI. Data was obtained from records including specifically, the documentation from Kathrine Ovalle NP, on 12/26/19 and on 9/9/19 which was personally reviewed and describes the patient's initial evaluation on 9/9 and subsequent management and evaluation which prompted referral to me at the visit on 12/26.       Review of radiographic imaging:I personally reviewed and interpreted imaging relative to the chief complaint consisting of sinus CT performed on 1/28/2020.  Key findings from this imaging study include findings of sinusitis, partial nasal obstruction from isaias bullosa, turbinate hypertrophy,, and biphasic septal widening from septal swell body hypertrophy.  Representative images selected from this study and demonstrating key findings are copied below:        Small incidental cyst above right maxillary canine.       Middle turbinate isaias bullosa bilateral. Turbinate hypertrophy with chronic inflammatory changes/degeneration of conchal bone. Left Maxillary mucous retention cyst.       Superior turbinate isaias  bullosa bilateral. Turbinate hypertrophy with chronic inflammatory changes/degeneration of conchal bone. Maxillary mucosal thickening.         Ethmoid mucosal thickening and partial opacifications extending to frontal recess and inferior frontals, with osteitic thickened frontal beak.    Assessment:     1. Acute recurrent pansinusitis    2. Chronic ethmoidal sinusitis    3. Chronic frontal sinusitis    4. Chronic maxillary sinusitis    5. Hypertrophy of inferior nasal turbinate    6. Nasal septal deviation    7. Nasal obstruction         Plan:     The patients presentation including history, symptoms, and findings on exam are most consistent with acute on chronic sinusitis. This diagnosis was discussed and all questions were answered. I recommended medical management with a combination of antibiotics to cover common bacterial causes of sinusitis as well as oral steroids, in addition to use of nasal steroids and nasal saline irrigations. The goals of treatment including eliminating infection as well as unblocking the sinuses and allowing the cilia and sinonasal mucosa to function properly again were explained.     Nasal saline irrigations and topical steroids:  I have recommended ongoing medical management with a combination of nasal saline irrigations, nasal antihistamine spray, and inhaled nasal steroids (fluticasone). Regarding nasal saline irrigations, specific instructions were provided on acquiring and using this treatment. Explanation was given regarding performing these irrigations, and also provided as written instructions. Fluticasone nasal spray and azelastine were not prescribed as she already has these- will call for refills if needed.    Antibiotics:  Instructions were given to take this medicine until it is gone, even if symptoms resolve sooner than the duration of prescribed antibiotics. I discussed risks and benefits of this and provided recommendations regarding common side effects. In regard to  common GI issues related to oral antibiotic use, I reviewed the potential for nausea, diarrhea, and/or constipation by reducing good bacteria and recommended replenishing good josé with probiotics (supplement pills, eating probiotic yogurt, and/or drinking kefir yogurt drink).   -     amoxicillin-clavulanate 875-125mg (AUGMENTIN) 875-125 mg per tablet; Take 1 tablet by mouth 2 (two) times daily. for 14 days  Dispense: 28 tablet; Refill: 0    Oral steroids:  The risks and benefits of oral steroid treatment with prednisone were discussed, including potential complications associated with oral steroid use, particular concerns for individuals with diabetes or glaucoma, as well as common adverse effects of this medication. The patient has elected to proceed with a course of oral steroids and taper of oral prednisone was prescribed. In addition to discussing the use of this medication, I have also provided written instructions on use and recommendations regarding common potential adverse effects. Encouraged to call for any additional questions or concerns, and to discontinue use if significant adverse reactions occur. Specific instructions for prednisone taper were provided with prescription of prednisone, 10mg tablets: 3 tablets per day for the first 4 days, 2 tablets per day for the next 4 days, then 1 tablet per day for the next 4 days -- a total of 24 tablets and 12 days to complete the taper. I reviewed potential adverse effects of difficulty sleeping, and recommend taking in the morning.    -     predniSONE (DELTASONE) 10 MG tablet; Take 3 tabs daily x4 days, 2 tabs daily x4 days, 1 tab daily x4 days, then stop.  Dispense: 24 tablet; Refill: 0    Postponed surgery for chronic sinusitis:  Her planned surgery to address chronic sinusitis and sources of partial nasal obstruction was previously scheduled for 4/21/2020. However this will now be postponed due to Covid-19 concerns, likely by approximately 3 months unless  there are acute changes or urgent issues to address sooner. I discussed this need for rescheduling surgery with the patient and advised contacting me for any significant changes or acute concerns in the meantime. I have recommended a followup preoperative visit prior to surgery, either in clinic or by video virtual visit depending on what is determined to be most appropriate closer to that time.      Chuck Quintero MD    Rhinology, Allergy, and Sinus-Skull Base Surgery    Department of Otorhinolaryngology    [facility]    Phone  [phone]    Fax      [phone]

## 2020-03-25 NOTE — PATIENT INSTRUCTIONS
Information and instructions from your visit with me today:    Diagnosis: Acute Sinusitis  Acute sinusitis is irritation and swelling of the sinuses. It can occur following a viral infection after a common cold or other causes of inflammation and welling in the nose that leads to sinus blockage.        Sinuses are air-filled spaces in the skull behind the face. They are kept moist and clean by a lining of mucosa. Things such as pollen, smoke, and chemical fumes can irritate the mucosa. It can then swell up. As a response to irritation, the mucosa makes more mucus and other fluids. Tiny hairlike cilia cover the mucosa. Cilia help carry mucus toward the opening of the sinus. Too much mucus may cause the cilia to stop working. This blocks the sinus opening. A buildup of fluid in the sinuses then causes pain and pressure. It can also encourage bacteria to grow in the sinuses.    Treating acute sinusitis  Treatment is aimed at unblocking the sinus opening and helping the cilia work again. Steroids are powerful medications that are effective at reducing swelling and treating inflammation, so are commonly used to treat this key underlying issue of acute sinusitis. If you have a bacterial infection, you will need to take antibiotic medicine for 10 to 14 days. Take this medicine until it is gone, even if you feel better.    My recommendations and treatments I started for you today:    · Start antibiotics:    Take all of this medication as instructed until completed. Normally a 10-14 day course.    Antibiotics can also upset your stomach and cause nausea, diarrhea, and/or constipation by reducing the good bacteria in your gut. Replenish the good bacteria by consuming PROBIOTICS either as supplement pills, eating probiotic yogurt, or drinking kefir yogurt drink. Look for terms like live and active cultures or live probiotics on the product - a common brand of probiotic yogurt is Cohealo.    · Start steroid  medication:    Take according to specific instructions until completed:  Prednisone, 10mg tablets: 3 tablets per day for the first 4 days, 2 tablets per day for the next 4 days, then 1 tablet per day for the next 4 days -- a total of 24 tablets and 12 days to complete the taper of this medication.   Steroids can cause difficulty sleeping, so I recommend you take this medication in the morning. If you still have trouble sleeping, taking melatonin (3-5mg) at night may help.   For individuals with diabetes: Steroids increase blood sugars. It is critical to monitor blood glucose carefully and adjust insulin as needed to control blood sugars while taking this medication.     · Continue using fluticasone nasal spray (and other nasal sprays already using e.g. Azelastine):    Use flonase as instructed on the prescription, 2 sprays on each side of your nose once daily. You need to use this medication every day regardless of symptoms, as it takes time to work and get the benefits. Use other sprays as indicated.    · Start nasal irrigations with saline solution:    SALINE SINUS RINSE (Omega Med brand): You should do a full bottle, half on one side of your nose and half on the other, 1-2 times per day (or more if able to, you cannot do this too much). Follow the instructions on the box: mix the salt packet with clean water (bottle, previously boiled, distilled, etc -- not tap water) to the line on the bottle to make the irrigation.      · Do not use nasal decongestant sprays such as Afrin or similar products more than 3 days maximum.    If you have significant pain the best regimen of medication is using ACETAMINOPHEN (Tylenol) and IBUPROFEN (Motrin). You can use any over-the-counter versions of acetaminophen and ibuprofen.  For severe discomfort these can be alternated so that you are taking acetaminophen then ibuprofen every 3 hours while awake, repeating as needed (6 hours between doses of the same type of  medication).    It was nice seeing you today, and I look forward to helping you feel better soon. Please don't hesitate to call if you have any other questions or concerns, or if I can be of any assistance in the meantime.       Chuck Quintero    [facility]    Phone  [phone]    Fax      [phone]        Chuck Quintero MD  Rhinology, Sinus, and Skull Base Surgery  Department of Otorhinolaryngology

## 2020-03-26 ENCOUNTER — PATIENT MESSAGE (OUTPATIENT)
Dept: OTOLARYNGOLOGY | Facility: CLINIC | Age: 35
End: 2020-03-26

## 2020-05-09 ENCOUNTER — RESEARCH ENCOUNTER (OUTPATIENT)
Dept: RESEARCH | Facility: HOSPITAL | Age: 35
End: 2020-05-09

## 2020-05-09 ENCOUNTER — LAB VISIT (OUTPATIENT)
Dept: PRIMARY CARE CLINIC | Facility: CLINIC | Age: 35
End: 2020-05-09
Payer: COMMERCIAL

## 2020-05-09 DIAGNOSIS — Z00.6 RESEARCH STUDY PATIENT: Primary | ICD-10-CM

## 2020-05-09 LAB — SARS-COV-2 IGG SERPLBLD QL IA.RAPID: POSITIVE

## 2020-05-09 PROCEDURE — 86769 SARS-COV-2 COVID-19 ANTIBODY: CPT

## 2020-05-09 PROCEDURE — U0002 COVID-19 LAB TEST NON-CDC: HCPCS

## 2020-05-09 NOTE — PROGRESS NOTES
Date of Consent: 05/09/2020    Sponsor: [facility]    Study Title/IRB Number: Observational study of Sars-CoV2 Immunoglobulin G (IgG) seroprevalence among the [facility] population over time 2020.163  Principle Investigator: Alejandra Bernal, PhD    Did the patient need translation services? No   name: N/A    Prior to the Informed Consent (IC) being signed, or any study protocol required data collection, testing, procedure, or intervention being performed, the following was done and/or discussed:   Patient was given a paper copy of the IC for review    Patient was given study FAQ   Purpose of the study and qualifications to participate    Study design and tests or procedures done at this visit   Confidentiality and HIPAA Authorization for Release of Medical Records for the research trial/ subject's rights/research related injury   Risk, Benefits, Alternative Treatments, Compensation and Costs   Participation in the research trial is voluntary and patient may withdraw at anytime   Contact information for study related questions    Patient verbalizes understanding of the above: Yes  Contact information for PI and IRB given to patient: Yes  Patient able to adequately summarize: the purpose of the study, the risks associated with the study, and all procedures, testing, and follow-ups associated with the study: Yes    The consent was discussed verbally with the patient and all questions were answered satisfactorily. Patient gave verbal consent for the Seroprevalence research study with an IRB approval date of 05/08/2020.      The Consent, Consent Witness and name of Clinical Research Coordinator consenting was captured and documented in REDCap.    All Inclusion and Exclusion Criteria reviewed, subject meets all Inclusion criteria and does not meet any Exclusion Criteria at this time.     Patient Eligibility was confirmed.    Patient responded to survey questions.    The following biospecimen  collection procedures were collected:    -Nasopharyngeal Swab Collection  -Blood collection

## 2020-05-11 LAB — SARS-COV-2 RNA RESP QL NAA+PROBE: NOT DETECTED

## 2020-06-19 ENCOUNTER — TELEPHONE (OUTPATIENT)
Dept: RESEARCH | Facility: HOSPITAL | Age: 35
End: 2020-06-19

## 2020-10-05 ENCOUNTER — PATIENT MESSAGE (OUTPATIENT)
Dept: ADMINISTRATIVE | Facility: HOSPITAL | Age: 35
End: 2020-10-05

## 2020-12-02 ENCOUNTER — LAB VISIT (OUTPATIENT)
Dept: INTERNAL MEDICINE | Facility: CLINIC | Age: 35
End: 2020-12-02
Payer: OTHER GOVERNMENT

## 2020-12-02 ENCOUNTER — OFFICE VISIT (OUTPATIENT)
Dept: INTERNAL MEDICINE | Facility: CLINIC | Age: 35
End: 2020-12-02
Payer: COMMERCIAL

## 2020-12-02 ENCOUNTER — LAB VISIT (OUTPATIENT)
Dept: LAB | Facility: HOSPITAL | Age: 35
End: 2020-12-02
Attending: INTERNAL MEDICINE
Payer: COMMERCIAL

## 2020-12-02 ENCOUNTER — TELEPHONE (OUTPATIENT)
Dept: INTERNAL MEDICINE | Facility: CLINIC | Age: 35
End: 2020-12-02

## 2020-12-02 VITALS
HEART RATE: 90 BPM | OXYGEN SATURATION: 100 % | DIASTOLIC BLOOD PRESSURE: 74 MMHG | BODY MASS INDEX: 26.12 KG/M2 | WEIGHT: 153 LBS | SYSTOLIC BLOOD PRESSURE: 116 MMHG | HEIGHT: 64 IN

## 2020-12-02 DIAGNOSIS — Z03.818 ENCOUNTER FOR OBSERVATION FOR SUSPECTED EXPOSURE TO OTHER BIOLOGICAL AGENTS RULED OUT: ICD-10-CM

## 2020-12-02 DIAGNOSIS — Z11.59 ENCOUNTER FOR HEPATITIS C SCREENING TEST FOR LOW RISK PATIENT: ICD-10-CM

## 2020-12-02 DIAGNOSIS — D64.9 ANEMIA, UNSPECIFIED TYPE: ICD-10-CM

## 2020-12-02 DIAGNOSIS — L50.9 HIVES: ICD-10-CM

## 2020-12-02 DIAGNOSIS — R05.9 COUGH: ICD-10-CM

## 2020-12-02 DIAGNOSIS — T78.3XXA ANGIOEDEMA, INITIAL ENCOUNTER: ICD-10-CM

## 2020-12-02 DIAGNOSIS — J31.0 CHRONIC RHINITIS: ICD-10-CM

## 2020-12-02 DIAGNOSIS — T78.3XXA ANGIOEDEMA, INITIAL ENCOUNTER: Primary | ICD-10-CM

## 2020-12-02 DIAGNOSIS — Z13.220 SCREENING FOR CHOLESTEROL LEVEL: ICD-10-CM

## 2020-12-02 DIAGNOSIS — R51.9 HEAD ACHE: ICD-10-CM

## 2020-12-02 LAB
ALBUMIN SERPL BCP-MCNC: 4.2 G/DL (ref 3.5–5.2)
ALP SERPL-CCNC: 57 U/L (ref 55–135)
ALT SERPL W/O P-5'-P-CCNC: 9 U/L (ref 10–44)
ANION GAP SERPL CALC-SCNC: 6 MMOL/L (ref 8–16)
AST SERPL-CCNC: 18 U/L (ref 10–40)
BASOPHILS # BLD AUTO: 0.03 K/UL (ref 0–0.2)
BASOPHILS NFR BLD: 0.6 % (ref 0–1.9)
BILIRUB SERPL-MCNC: 0.7 MG/DL (ref 0.1–1)
BUN SERPL-MCNC: 7 MG/DL (ref 6–20)
CALCIUM SERPL-MCNC: 9.7 MG/DL (ref 8.7–10.5)
CHLORIDE SERPL-SCNC: 105 MMOL/L (ref 95–110)
CHOLEST SERPL-MCNC: 204 MG/DL (ref 120–199)
CHOLEST/HDLC SERPL: 3.1 {RATIO} (ref 2–5)
CO2 SERPL-SCNC: 27 MMOL/L (ref 23–29)
CREAT SERPL-MCNC: 1.2 MG/DL (ref 0.5–1.4)
DIFFERENTIAL METHOD: ABNORMAL
EOSINOPHIL # BLD AUTO: 0.2 K/UL (ref 0–0.5)
EOSINOPHIL NFR BLD: 4.9 % (ref 0–8)
ERYTHROCYTE [DISTWIDTH] IN BLOOD BY AUTOMATED COUNT: 12.6 % (ref 11.5–14.5)
EST. GFR  (AFRICAN AMERICAN): >60 ML/MIN/1.73 M^2
EST. GFR  (NON AFRICAN AMERICAN): 58.3 ML/MIN/1.73 M^2
FERRITIN SERPL-MCNC: 77 NG/ML (ref 20–300)
GLUCOSE SERPL-MCNC: 85 MG/DL (ref 70–110)
HCT VFR BLD AUTO: 40.7 % (ref 37–48.5)
HDLC SERPL-MCNC: 66 MG/DL (ref 40–75)
HDLC SERPL: 32.4 % (ref 20–50)
HGB BLD-MCNC: 13 G/DL (ref 12–16)
IMM GRANULOCYTES # BLD AUTO: 0.01 K/UL (ref 0–0.04)
IMM GRANULOCYTES NFR BLD AUTO: 0.2 % (ref 0–0.5)
LDLC SERPL CALC-MCNC: 126.2 MG/DL (ref 63–159)
LYMPHOCYTES # BLD AUTO: 1.3 K/UL (ref 1–4.8)
LYMPHOCYTES NFR BLD: 25.6 % (ref 18–48)
MCH RBC QN AUTO: 29.7 PG (ref 27–31)
MCHC RBC AUTO-ENTMCNC: 31.9 G/DL (ref 32–36)
MCV RBC AUTO: 93 FL (ref 82–98)
MONOCYTES # BLD AUTO: 0.3 K/UL (ref 0.3–1)
MONOCYTES NFR BLD: 6.9 % (ref 4–15)
NEUTROPHILS # BLD AUTO: 3.1 K/UL (ref 1.8–7.7)
NEUTROPHILS NFR BLD: 61.8 % (ref 38–73)
NONHDLC SERPL-MCNC: 138 MG/DL
NRBC BLD-RTO: 0 /100 WBC
PLATELET # BLD AUTO: 268 K/UL (ref 150–350)
PMV BLD AUTO: 10.6 FL (ref 9.2–12.9)
POTASSIUM SERPL-SCNC: 4.1 MMOL/L (ref 3.5–5.1)
PROT SERPL-MCNC: 7.8 G/DL (ref 6–8.4)
RBC # BLD AUTO: 4.37 M/UL (ref 4–5.4)
SODIUM SERPL-SCNC: 138 MMOL/L (ref 136–145)
TRIGL SERPL-MCNC: 59 MG/DL (ref 30–150)
WBC # BLD AUTO: 4.93 K/UL (ref 3.9–12.7)

## 2020-12-02 PROCEDURE — 85025 COMPLETE CBC W/AUTO DIFF WBC: CPT

## 2020-12-02 PROCEDURE — 1126F PR PAIN SEVERITY QUANTIFIED, NO PAIN PRESENT: ICD-10-PCS | Mod: S$GLB,,, | Performed by: INTERNAL MEDICINE

## 2020-12-02 PROCEDURE — U0003 INFECTIOUS AGENT DETECTION BY NUCLEIC ACID (DNA OR RNA); SEVERE ACUTE RESPIRATORY SYNDROME CORONAVIRUS 2 (SARS-COV-2) (CORONAVIRUS DISEASE [COVID-19]), AMPLIFIED PROBE TECHNIQUE, MAKING USE OF HIGH THROUGHPUT TECHNOLOGIES AS DESCRIBED BY CMS-2020-01-R: HCPCS

## 2020-12-02 PROCEDURE — 86803 HEPATITIS C AB TEST: CPT

## 2020-12-02 PROCEDURE — 99999 PR PBB SHADOW E&M-EST. PATIENT-LVL III: ICD-10-PCS | Mod: PBBFAC,,, | Performed by: INTERNAL MEDICINE

## 2020-12-02 PROCEDURE — 80053 COMPREHEN METABOLIC PANEL: CPT

## 2020-12-02 PROCEDURE — 99999 PR PBB SHADOW E&M-EST. PATIENT-LVL III: CPT | Mod: PBBFAC,,, | Performed by: INTERNAL MEDICINE

## 2020-12-02 PROCEDURE — 82728 ASSAY OF FERRITIN: CPT

## 2020-12-02 PROCEDURE — 99214 OFFICE O/P EST MOD 30 MIN: CPT | Mod: S$GLB,,, | Performed by: INTERNAL MEDICINE

## 2020-12-02 PROCEDURE — 36415 COLL VENOUS BLD VENIPUNCTURE: CPT

## 2020-12-02 PROCEDURE — 99214 PR OFFICE/OUTPT VISIT, EST, LEVL IV, 30-39 MIN: ICD-10-PCS | Mod: S$GLB,,, | Performed by: INTERNAL MEDICINE

## 2020-12-02 PROCEDURE — 80061 LIPID PANEL: CPT

## 2020-12-02 PROCEDURE — 3008F PR BODY MASS INDEX (BMI) DOCUMENTED: ICD-10-PCS | Mod: CPTII,S$GLB,, | Performed by: INTERNAL MEDICINE

## 2020-12-02 PROCEDURE — 1126F AMNT PAIN NOTED NONE PRSNT: CPT | Mod: S$GLB,,, | Performed by: INTERNAL MEDICINE

## 2020-12-02 PROCEDURE — 3008F BODY MASS INDEX DOCD: CPT | Mod: CPTII,S$GLB,, | Performed by: INTERNAL MEDICINE

## 2020-12-02 RX ORDER — CETIRIZINE HYDROCHLORIDE 10 MG/1
TABLET ORAL
Qty: 30 TABLET | Refills: 2 | Status: SHIPPED | OUTPATIENT
Start: 2020-12-02 | End: 2020-12-07 | Stop reason: ALTCHOICE

## 2020-12-03 LAB
HCV AB SERPL QL IA: NEGATIVE
SARS-COV-2 RNA RESP QL NAA+PROBE: NOT DETECTED

## 2020-12-04 ENCOUNTER — PATIENT OUTREACH (OUTPATIENT)
Dept: ADMINISTRATIVE | Facility: OTHER | Age: 35
End: 2020-12-04

## 2020-12-06 ENCOUNTER — PATIENT MESSAGE (OUTPATIENT)
Dept: INTERNAL MEDICINE | Facility: CLINIC | Age: 35
End: 2020-12-06

## 2020-12-07 ENCOUNTER — LAB VISIT (OUTPATIENT)
Dept: LAB | Facility: HOSPITAL | Age: 35
End: 2020-12-07
Attending: ALLERGY & IMMUNOLOGY
Payer: COMMERCIAL

## 2020-12-07 ENCOUNTER — PATIENT MESSAGE (OUTPATIENT)
Dept: ALLERGY | Facility: CLINIC | Age: 35
End: 2020-12-07

## 2020-12-07 ENCOUNTER — OFFICE VISIT (OUTPATIENT)
Dept: ALLERGY | Facility: CLINIC | Age: 35
End: 2020-12-07
Payer: COMMERCIAL

## 2020-12-07 VITALS — BODY MASS INDEX: 26.34 KG/M2 | WEIGHT: 154.31 LBS | HEIGHT: 64 IN

## 2020-12-07 DIAGNOSIS — L50.9 HIVES: ICD-10-CM

## 2020-12-07 DIAGNOSIS — T78.3XXA ANGIOEDEMA, INITIAL ENCOUNTER: Primary | ICD-10-CM

## 2020-12-07 DIAGNOSIS — Z01.84 ENCOUNTER FOR ANTIBODY RESPONSE EXAMINATION: ICD-10-CM

## 2020-12-07 DIAGNOSIS — T78.3XXA ANGIOEDEMA, INITIAL ENCOUNTER: ICD-10-CM

## 2020-12-07 DIAGNOSIS — J31.0 CHRONIC RHINITIS: ICD-10-CM

## 2020-12-07 DIAGNOSIS — J45.909 CHILDHOOD ASTHMA, UNSPECIFIED ASTHMA SEVERITY, UNCOMPLICATED: ICD-10-CM

## 2020-12-07 DIAGNOSIS — H10.403 CHRONIC CONJUNCTIVITIS OF BOTH EYES, UNSPECIFIED CHRONIC CONJUNCTIVITIS TYPE: ICD-10-CM

## 2020-12-07 LAB
IGE SERPL-ACNC: 58 IU/ML (ref 0–100)
THYROGLOB AB SERPL IA-ACNC: <4 IU/ML (ref 0–3.9)
THYROPEROXIDASE IGG SERPL-ACNC: <6 IU/ML

## 2020-12-07 PROCEDURE — 82784 ASSAY IGA/IGD/IGG/IGM EACH: CPT

## 2020-12-07 PROCEDURE — 82785 ASSAY OF IGE: CPT

## 2020-12-07 PROCEDURE — 99999 PR PBB SHADOW E&M-EST. PATIENT-LVL III: ICD-10-PCS | Mod: PBBFAC,,, | Performed by: ALLERGY & IMMUNOLOGY

## 2020-12-07 PROCEDURE — 86800 THYROGLOBULIN ANTIBODY: CPT

## 2020-12-07 PROCEDURE — 99999 PR PBB SHADOW E&M-EST. PATIENT-LVL III: CPT | Mod: PBBFAC,,, | Performed by: ALLERGY & IMMUNOLOGY

## 2020-12-07 PROCEDURE — 86376 MICROSOMAL ANTIBODY EACH: CPT

## 2020-12-07 PROCEDURE — 84165 PROTEIN E-PHORESIS SERUM: CPT | Mod: 26,,, | Performed by: PATHOLOGY

## 2020-12-07 PROCEDURE — 84165 PATHOLOGIST INTERPRETATION SPE: ICD-10-PCS | Mod: 26,,, | Performed by: PATHOLOGY

## 2020-12-07 PROCEDURE — 84165 PROTEIN E-PHORESIS SERUM: CPT

## 2020-12-07 PROCEDURE — 36415 COLL VENOUS BLD VENIPUNCTURE: CPT

## 2020-12-07 PROCEDURE — 82784 ASSAY IGA/IGD/IGG/IGM EACH: CPT | Mod: 59

## 2020-12-07 PROCEDURE — 84443 ASSAY THYROID STIM HORMONE: CPT

## 2020-12-07 PROCEDURE — 86003 ALLG SPEC IGE CRUDE XTRC EA: CPT

## 2020-12-07 PROCEDURE — 99244 PR OFFICE CONSULTATION,LEVEL IV: ICD-10-PCS | Mod: S$GLB,,, | Performed by: ALLERGY & IMMUNOLOGY

## 2020-12-07 PROCEDURE — 3008F PR BODY MASS INDEX (BMI) DOCUMENTED: ICD-10-PCS | Mod: CPTII,S$GLB,, | Performed by: ALLERGY & IMMUNOLOGY

## 2020-12-07 PROCEDURE — 1126F AMNT PAIN NOTED NONE PRSNT: CPT | Mod: S$GLB,,, | Performed by: ALLERGY & IMMUNOLOGY

## 2020-12-07 PROCEDURE — 83520 IMMUNOASSAY QUANT NOS NONAB: CPT

## 2020-12-07 PROCEDURE — 86769 SARS-COV-2 COVID-19 ANTIBODY: CPT

## 2020-12-07 PROCEDURE — 3008F BODY MASS INDEX DOCD: CPT | Mod: CPTII,S$GLB,, | Performed by: ALLERGY & IMMUNOLOGY

## 2020-12-07 PROCEDURE — 1126F PR PAIN SEVERITY QUANTIFIED, NO PAIN PRESENT: ICD-10-PCS | Mod: S$GLB,,, | Performed by: ALLERGY & IMMUNOLOGY

## 2020-12-07 PROCEDURE — 82306 VITAMIN D 25 HYDROXY: CPT

## 2020-12-07 PROCEDURE — 86003 ALLG SPEC IGE CRUDE XTRC EA: CPT | Mod: 59

## 2020-12-07 PROCEDURE — 99244 OFF/OP CNSLTJ NEW/EST MOD 40: CPT | Mod: S$GLB,,, | Performed by: ALLERGY & IMMUNOLOGY

## 2020-12-07 PROCEDURE — 86317 IMMUNOASSAY INFECTIOUS AGENT: CPT | Mod: 59

## 2020-12-07 NOTE — LETTER
December 7, 2020      Romi Salgado MD  [address]  [facility] [address]           [facility]  [address]  [facility] [address]  Phone: [phone]  Fax: [phone]          Patient: Ganesh Schroeder   MR Number: [id number]   YOB: 1985   Date of Visit: 12/7/2020       Dear Dr. Romi Salgado:    Thank you for referring Ganesh Schroeder to me for evaluation. Attached you will find relevant portions of my assessment and plan of care.    If you have questions, please do not hesitate to call me. I look forward to following Ganesh Schroeder along with you.    Sincerely,    IVETTE Merchant III, MD    Enclosure  CC:  No Recipients    If you would like to receive this communication electronically, please contact [email or web address] or [phone] to request more information on [employer] access.    For providers and/or their staff who would like to refer a patient to [facility], please contact us through our one-stop-shop provider referral line, [facility], at [phone].    If you feel you have received this communication in error or would no longer like to receive these types of communications, please e-mail [email or web address]

## 2020-12-07 NOTE — PROGRESS NOTES
Subjective:       Patient ID: Ganesh Schroeder is a 35 y.o. female.    Chief Complaint: Edema and Rash    This dictation was performed using using MModal.   URGENT VISIT this 35-year-old woman presents to the office today because of to allergic reactions in the past 3 days   Initially she started with hives on her arms and legs, took Benadryl and resolved  Last night her upper lip became suddenly swollen  Again this morning, her lip became suddenly swollen  She drops her mask and she has classic angioneurotic edema  Additionally she says her and hands hands are itchy     This 35-year-old woman enjoys good health  Childhood she had no allergies or asthma  As a teenager she had a rash caused by laundry detergent change to free and clear product with resolution  She has been using game products for the past few years she vital are 28th and has not had any problems  Her only hospitalization has been for 2 C sections her children are now ages 2 and 6 she has been using a nilam IUD since the birth of her youngest child    She has had no previous allergic reactions  The only unusual thing she can remember is eating less eat ago since Thanksgiving    Please for give type pose in all sorts of miss work because of M*Modal Z inaccuracy  HPI  Review of Systems  Review of systems is negative unless noted.  Objective:      Physical Exam  Vitals signs reviewed.   Constitutional:       General: She is not in acute distress.  HENT:      Head: Atraumatic.   Eyes:      General: No scleral icterus.     Conjunctiva/sclera: Conjunctivae normal.   Neck:      Musculoskeletal: Neck supple.   Cardiovascular:      Rate and Rhythm: Normal rate and regular rhythm.   Pulmonary:      Effort: Pulmonary effort is normal.      Breath sounds: Normal breath sounds.   Abdominal:      Palpations: Abdomen is soft.      Tenderness: There is no abdominal tenderness.   Lymphadenopathy:      Cervical: No cervical adenopathy.   Skin:     General: Skin is warm  and dry.      Comments: Striking angioneurotic edema involving her upper lip     no hives seen   Neurological:      Mental Status: She is alert.   Psychiatric:         Behavior: Behavior normal.         Assessment:       1. Angioedema, initial encounter, lip    2. Hives    3. Chronic rhinitis    4. Anemia, unspecified type    5. Encounter for hepatitis C screening test for low risk patient    6. Screening for cholesterol level    7. Encounter for observation for suspected exposure to other biological agents ruled out        Plan:   Ganesh was seen today for edema and rash.    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  She is most distressed about this angioedema  -     Ambulatory referral/consult to Allergy; Future  -     CBC Auto Differential; Future  -     Comprehensive Metabolic Panel; Future    Hives  -     Ambulatory referral/consult to Allergy; Future  -     CBC Auto Differential; Future  -     Comprehensive Metabolic Panel; Future    Chronic rhinitis  -     cetirizine (ZYRTEC) 10 MG tablet; Two tabs twice a day or loratidine (Claritin 10 mg two twice a day)    Anemia, unspecified type  -     Ferritin; Future    Encounter for hepatitis C screening test for low risk patient  -     Hepatitis C Antibody; Future    Screening for cholesterol level  -     Lipid Panel; Future    Encounter for observation for suspected exposure to other biological agents ruled out  -     COVID-19 Routine Screening

## 2020-12-07 NOTE — PROGRESS NOTES
Ganesh Schroeder is referred by Dr. Romi Salgado for a consult regarding urticaria and angioedema.  She is here alone.    She developed urticarial lesions on November 29, 2020.  The lesions were red, raised, and pruritic.  They do not hurt or bruise.  They do not last longer than 24 hours in one location before resolving.    She took Benadryl and they resolved.      The following day she developed swelling of the left upper lip and took another Benadryl.  The next day she had swelling of her face.  She again took Benadryl.    She then saw Dr. Romi Salgado who prescribed oral prednisone as well as daily Zyrtec.  She just finished the prednisone and has not taken Zyrtec in several days.    She has not had any further urticaria or angioedema.    On November 29 she had Thanksgiving leftover is including seafood gumbo with crab and shrimp, roasted chicken, macaroni and cheese, canned corn, and dinner rolls.    She ate this for several days in a row.    She now has been avoiding all shellfish.  She used to use this twice a week.    She did have exercise induced asthma in high school.  She had albuterol.  She has not had any recent need for this.    She has had recurrent rhinitis for several years that has been increasing in severity.    She has eye itching and difficulty wearing her contact lenses.  She also has sneezing, clear rhinorrhea, nasal congestion, and postnasal drip.    She has seen Dr. Quintero in ENT last spring who diagnosed her with chronic recurrent sinusitis.  She was scheduled for sinus surgery last April but this was postponed due to COVID-19.    She denies any indigestion or heartburn.    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    [facility] ALLERGY QUESTIONNAIRE LONG 12/7/2020   Head or facial pain: Facial swelling   Please describe your head and/or facial pain.  I experienced facial swelling last week   Eyes: No symptoms   Do you have difficulty wearing contacts, if applicable?  Yes    Which kind of eye drops do you use, if applicable? Blink contacts   Ears: No symptoms   Do you have ear infections? No   Do you have ear tubes? No   Did you have ear surgery? No   Nose: Post nasal drip, Sneezing, Runny nose, Nasal polyps, Snoring   Did you have a blocked nose? Yes   Which side was your nose blocked? It alternates   Did you have nasal surgery? No   Has your nose ever been broken? No   Throat: No symptoms   Sinuses: Sinus pressure or pain, Sinus infections   Have you had x-rays done for your sinuses? Yes   When and where did you have x-rays? [facility]   Have you had a CT scan done for your sinuses? Yes   When and where did you have a CT scan? [facility]   Lungs: No symptoms   Have you ever has a tuberculosis skin test?  Yes   When did you have a tuberculosis skin test? Probably 8 years ago for work/collage   Was your tuberculosis skin test positive or negative? Negative   Have you ever had a lung-function test? No   Have you had a flu shot this year? No   Have you had the pneumonia vaccine?  No   Do you have any known problems with your immune system? No   Do you suspect you may have problems with your immune system? No   Do you have frequent infections? No   Skin: Hives, Swelling, Eczema / dry skin   When did your hives and/or swelling first begin? Last week   Please note the frequency of hives and/or swelling in days, weeks OR months. 2 times last week   Body location most commonly affected by hives: Hives on legs, face and lip swelling!   Body location most commonly affected by swelling: Lips   What are the substances, contacts, activity, foods, or drugs, which you think are related to hives or swelling? Not sure   Which medications have been helpful in controlling hives or swelling? Benedryl was NOT helpful   Are you taking this medication regularly? No   Have you associated the hives or swelling with any of the following? Not applicable   Have you had any other associated symptoms with the  hives or swelling such as: Not applicable   When did these symptoms first occur? Last week, havent experienced an outbreak this week   Are they getting worse or better? Better   How often do these symptoms occur? Just last week   When do these symptoms occur? Both occured late at, Night   Do they occur year round? No   If there is any seasonal variation in your symptoms, when are they worse? N/A   Is there a particular time of the day or night when the symptoms are worse? Night   Is there anything you have identified, which can cause symptoms or make them worse? (such as dust, grass, plant or animal products, mold, heat, cold, strong odors, exercise) Possibly seafood/shell fish   Is there anything you have identified, which can make symptoms better?  No   What medications have you tried in the past to help control these symptoms?  None   Please list all the vitamins or herbal medications you are taking. None   Have you ever seen an allergist for these symptoms? No   Have you ever had skin tests? No   Have you ever had any other type of allergy testing? No   Have you ever had allergy shots? No   Do you have food allergies? No   Do you have insect allergies? No   Do you have latex allergies? No   Constitution No symptoms   Cardiovascular: No symptoms   Gastrointestinal: No symptoms   Genital/ urinary: No symptoms   Musculoskeletal: No symptoms   Endocrine: No symptoms   Hematologic: No symptoms   Please note which family members have allergies or asthma and specify which they have. None   How long have you lived at your current address? 8 years   Has your residence ever had water or flood damage? No   Is there any evidence of mold in the house? No   Does your house have: Central air conditioning   Does your bedroom have: Ceiling fan, Venetian blinds   What type of pillow do you have, for example feather, foam and fiberfill?  Bamboo/feather/foam   Do you have pets? Yes   Please list the type of pet(s), how many, how  long you have had the pet(s), whether or not the pet(s) are living inside or outside, and whether the pet(s) aggravate your symptoms.  Dejon Mota Puppy, one year on [place]   Does anyone in the house smoke? 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 Please specify which symptoms, if applicable.  No   Do you suspect anything at work or school, which may be causing your symptoms? If so, please elaborate.  No   Is there anything else that might be important for the doctor to know?  The week of both outbreaks i had eaten gumbo   Did you find this questionnaire helpful in addressing your symptoms?  Yes     Physical Examination:  General: Well-developed, well-nourished, no acute distress.  Head: No sinus tenderness.  Eyes: Conjunctivae:  No bulbar or palpebral conjunctival injection.  Ears: EAC's clear.  TM's clear.  No pre-auricular nodes.  Nose: Nasal Mucosa:  Pink.  Septum: No apparent deviation.  Turbinates:  No significant edema.  Polyps/Mass:  None visible.  Teeth/Gums:  No bleeding noted.  Oropharynx: No exudates.  Neck: Supple without thyromegaly. No cervical lymphadenopathy.    Respiratory/Chest: Effort: Good.  Auscultation:  Clear bilaterally.  Cardiovascular:  No murmur, rubs, or gallop heard.   GI:  Non-tender.  No masses.  No organomegaly.  Extremities:  No cyanosis, clubbing, or edema.  Skin: Good turgor.  No urticaria or angioedema.  Neuro/Psych: Oriented x 3.    Sinus CT 01/28/2020:  1. Left maxillary antral retention cyst as above.  2. Moon bullosa of the middle turbinates bilaterally as above.  3. Right canine apical root cyst as above.    Assessment:  1.  Acute urticaria angioedema of uncertain etiology.  2.  Chronic rhinitis, consider allergic.  3.  Chronic conjunctivitis, consider allergic.  4.  History of exercise induced asthma, now controlled.  5.  History of chronic sinusitis.    Recommendations:  1.  Laboratory as ordered.  2.  Take pictures  of any further lesions.  3.  Journal any episode.  4.  Avoid shellfish for now.  5.  Return to clinic in one week.  6.  Skin test off antihistamines if needed.

## 2020-12-08 LAB
25(OH)D3+25(OH)D2 SERPL-MCNC: 16 NG/ML (ref 30–96)
IGA SERPL-MCNC: 288 MG/DL (ref 40–350)
IGG SERPL-MCNC: 1440 MG/DL (ref 650–1600)
IGM SERPL-MCNC: 77 MG/DL (ref 50–300)
SARS-COV-2 IGG SERPLBLD QL IA.RAPID: NEGATIVE
TSH SERPL DL<=0.005 MIU/L-ACNC: 1.28 UIU/ML (ref 0.4–4)

## 2020-12-09 LAB
ALBUMIN SERPL ELPH-MCNC: 4.59 G/DL (ref 3.35–5.55)
ALPHA1 GLOB SERPL ELPH-MCNC: 0.28 G/DL (ref 0.17–0.41)
ALPHA2 GLOB SERPL ELPH-MCNC: 0.76 G/DL (ref 0.43–0.99)
B-GLOBULIN SERPL ELPH-MCNC: 0.81 G/DL (ref 0.5–1.1)
GAMMA GLOB SERPL ELPH-MCNC: 1.36 G/DL (ref 0.67–1.58)
PATHOLOGIST INTERPRETATION SPE: NORMAL
PROT SERPL-MCNC: 7.8 G/DL (ref 6–8.4)
TRYPTASE LEVEL: 3.4 NG/ML

## 2020-12-10 LAB
A ALTERNATA IGE QN: <0.1 KU/L
A FUMIGATUS IGE QN: <0.1 KU/L
BERMUDA GRASS IGE QN: <0.1 KU/L
CAT DANDER IGE QN: <0.1 KU/L
CEDAR IGE QN: <0.1 KU/L
CRAB IGE QN: <0.1 KU/L
CRAWFISH IGE QN: 0.48 KU/L
D FARINAE IGE QN: <0.1 KU/L
D PTERONYSS IGE QN: <0.1 KU/L
DEPRECATED A ALTERNATA IGE RAST QL: NORMAL
DEPRECATED A FUMIGATUS IGE RAST QL: NORMAL
DEPRECATED BERMUDA GRASS IGE RAST QL: NORMAL
DEPRECATED CAT DANDER IGE RAST QL: NORMAL
DEPRECATED CEDAR IGE RAST QL: NORMAL
DEPRECATED CRAB IGE RAST QL: NORMAL
DEPRECATED CRAWFISH IGE RAST QL: ABNORMAL
DEPRECATED D FARINAE IGE RAST QL: NORMAL
DEPRECATED D PTERONYSS IGE RAST QL: NORMAL
DEPRECATED DOG DANDER IGE RAST QL: NORMAL
DEPRECATED ELDER IGE RAST QL: NORMAL
DEPRECATED ENGL PLANTAIN IGE RAST QL: NORMAL
DEPRECATED LOBSTER IGE RAST QL: ABNORMAL
DEPRECATED PECAN/HICK TREE IGE RAST QL: NORMAL
DEPRECATED ROACH IGE RAST QL: NORMAL
DEPRECATED S PNEUM12 IGG SER-MCNC: <0.3 UG/ML
DEPRECATED S PNEUM23 IGG SER-MCNC: <0.3 UG/ML
DEPRECATED S PNEUM4 IGG SER-MCNC: <0.3 UG/ML
DEPRECATED S PNEUM8 IGG SER-MCNC: <0.3 UG/ML
DEPRECATED S PNEUM9 IGG SER-MCNC: <0.3 UG/ML
DEPRECATED SHRIMP IGE RAST QL: ABNORMAL
DEPRECATED TIMOTHY IGE RAST QL: NORMAL
DEPRECATED WEST RAGWEED IGE RAST QL: NORMAL
DEPRECATED WHITE OAK IGE RAST QL: NORMAL
DOG DANDER IGE QN: <0.1 KU/L
ELDER IGE QN: <0.1 KU/L
ENGL PLANTAIN IGE QN: <0.1 KU/L
LOBSTER IGE QN: 0.25 KU/L
PECAN/HICK TREE IGE QN: <0.1 KU/L
ROACH IGE QN: <0.1 KU/L
S PN DA SERO 19F IGG SER-MCNC: <0.3 UG/ML
S PNEUM DA 1 IGG SER-MCNC: 1.1 UG/ML
S PNEUM DA 14 IGG SER-MCNC: <0.3
S PNEUM DA 18C IGG SER-MCNC: <0.3
S PNEUM DA 3 IGG SER-MCNC: <0.3 UG/ML
S PNEUM DA 5 IGG SER-MCNC: <0.3 UG/ML
S PNEUM DA 6B IGG SER-MCNC: <0.3 UG/ML
S PNEUM DA 7F IGG SER-MCNC: <0.3 UG/ML
S PNEUM DA 9V IGG SER-MCNC: <0.3 UG/ML
SHRIMP IGE QN: 1.3 KU/L
TIMOTHY IGE QN: <0.1 KU/L
WEST RAGWEED IGE QN: <0.1 KU/L
WHITE OAK IGE QN: <0.1 KU/L

## 2020-12-14 ENCOUNTER — OFFICE VISIT (OUTPATIENT)
Dept: ALLERGY | Facility: CLINIC | Age: 35
End: 2020-12-14
Payer: COMMERCIAL

## 2020-12-14 VITALS — HEIGHT: 64 IN | BODY MASS INDEX: 26.2 KG/M2 | WEIGHT: 153.44 LBS

## 2020-12-14 DIAGNOSIS — T78.3XXA ANGIOEDEMA, INITIAL ENCOUNTER: Primary | ICD-10-CM

## 2020-12-14 DIAGNOSIS — E55.9 VITAMIN D INSUFFICIENCY: ICD-10-CM

## 2020-12-14 DIAGNOSIS — J31.0 CHRONIC RHINITIS: ICD-10-CM

## 2020-12-14 DIAGNOSIS — L50.9 HIVES: ICD-10-CM

## 2020-12-14 PROCEDURE — 99214 OFFICE O/P EST MOD 30 MIN: CPT | Mod: S$GLB,,, | Performed by: ALLERGY & IMMUNOLOGY

## 2020-12-14 PROCEDURE — 1126F PR PAIN SEVERITY QUANTIFIED, NO PAIN PRESENT: ICD-10-PCS | Mod: S$GLB,,, | Performed by: ALLERGY & IMMUNOLOGY

## 2020-12-14 PROCEDURE — 99214 PR OFFICE/OUTPT VISIT, EST, LEVL IV, 30-39 MIN: ICD-10-PCS | Mod: S$GLB,,, | Performed by: ALLERGY & IMMUNOLOGY

## 2020-12-14 PROCEDURE — 99999 PR PBB SHADOW E&M-EST. PATIENT-LVL II: ICD-10-PCS | Mod: PBBFAC,,, | Performed by: ALLERGY & IMMUNOLOGY

## 2020-12-14 PROCEDURE — 99999 PR PBB SHADOW E&M-EST. PATIENT-LVL II: CPT | Mod: PBBFAC,,, | Performed by: ALLERGY & IMMUNOLOGY

## 2020-12-14 PROCEDURE — 3008F PR BODY MASS INDEX (BMI) DOCUMENTED: ICD-10-PCS | Mod: CPTII,S$GLB,, | Performed by: ALLERGY & IMMUNOLOGY

## 2020-12-14 PROCEDURE — 1126F AMNT PAIN NOTED NONE PRSNT: CPT | Mod: S$GLB,,, | Performed by: ALLERGY & IMMUNOLOGY

## 2020-12-14 PROCEDURE — 3008F BODY MASS INDEX DOCD: CPT | Mod: CPTII,S$GLB,, | Performed by: ALLERGY & IMMUNOLOGY

## 2020-12-14 RX ORDER — EPINEPHRINE 0.3 MG/.3ML
1 INJECTION SUBCUTANEOUS ONCE
Qty: 2 DEVICE | Refills: 5 | Status: SHIPPED | OUTPATIENT
Start: 2020-12-14 | End: 2022-05-03

## 2020-12-14 RX ORDER — ERGOCALCIFEROL 1.25 MG/1
50000 CAPSULE ORAL
Qty: 12 CAPSULE | Refills: 1 | Status: SHIPPED | OUTPATIENT
Start: 2020-12-14 | End: 2022-05-03

## 2020-12-14 NOTE — PROGRESS NOTES
Ganesh Schroeder returns to clinic today for continued evaluation of acute urticaria.  She is here alone.  She was last seen 2020.    After her last visit, she did send a picture of a lesion on her abdomen that could be consistent with urticaria.  She had not had any exposure to shellfish.    She has been avoiding all shellfish.  Other than this small area she has not had any urticaria.  She has not had any angioedema.    She usually eats shellfish particularly shrimp twice a week.    She has not been taking any antihistamine.    She has had sinusitis in the past.  Surgery was discussed but was postponed due to COVID-19.    Her rhinitis and conjunctivitis are now controlled.    [facility] ALLERGY QUESTIONNAIRE SHORT 2020   Facial swelling? No   Sinus pain? Yes   Sinus pressure? Yes   Ears: -   Ear discharge? No   Ear pain? No   Hearing loss? No   Nosebleeds? No   Postnasal drip? Yes   Sneezing? Yes   Runny nose? Yes   Congestion? No   Throat: -   Sore throat? No   Trouble swallowing? No   Voice change? No   Eyes: -   Eye itching? No   Eye redness? No   Eye discharge? No   Eye pain?  No   Light sensitivity / light hurts the eyes? No   Lungs: -   Cough? No   Wheezing? No   Shortness of breath? No   Apnea? No   Choking? No   Chest tightness? No   Rash? No   Color change of skin? No     Physical Examination:  General: Well-developed, well-nourished, no acute distress.  Skin: Good turgor.  No urticaria or angioedema.  Neuro/Psych: Oriented x 3.    Sinus CT 2020:  1. Left maxillary antral retention cyst as above.  2. Moon bullosa of the middle turbinates bilaterally as above.  3. Right canine apical root cyst as above.    Laboratory 2020:  IgE level:  58.  ImmunoCAP:  Class II:  Shrimp.  Class I:  Crawfish.  Class 0/I:  Lobster (0.25).  IgA:  288.  Ig.  IgM:  77.  Pneumococcal titers:  Not protective.  TSH:  1.281.  Thyroid peroxidase antibody level:  Less than 6.0.  Thyroglobulin  antibody level:  Less than 4.0.  Serum tryptase:  2.4.  Vitamin-D Level:  16.  SPEP:  Normal.    Assessment:  1.  Acute urticaria angioedema of uncertain etiology, now resolved..  2.  Chronic rhinitis, consider allergic.  3.  Chronic conjunctivitis, consider allergic.  4.  History of exercise induced asthma, now controlled.  5.  History of chronic sinusitis.  6.  Vitamin-D insufficiency.    Recommendations:  1.  Continue to observe for any further lesions.  2.  Avoid shellfish for now.  3.  EpiPen.  4.  Start vitamin-D replacement.  5.  Return to clinic in several weeks or sooner if any lesions.    Patient education December 14, 2020:  Allergic mechanisms and treatment options were reviewed in detail.  Food allergy and risks of anaphylaxis were reviewed.  EpiPen technique was reviewed.

## 2021-04-16 ENCOUNTER — PATIENT MESSAGE (OUTPATIENT)
Dept: RESEARCH | Facility: HOSPITAL | Age: 36
End: 2021-04-16

## 2021-12-14 ENCOUNTER — OFFICE VISIT (OUTPATIENT)
Dept: OBSTETRICS AND GYNECOLOGY | Facility: CLINIC | Age: 36
End: 2021-12-14
Payer: COMMERCIAL

## 2021-12-14 VITALS
WEIGHT: 148.38 LBS | BODY MASS INDEX: 25.33 KG/M2 | HEIGHT: 64 IN | DIASTOLIC BLOOD PRESSURE: 80 MMHG | SYSTOLIC BLOOD PRESSURE: 126 MMHG

## 2021-12-14 DIAGNOSIS — Z01.419 WELL WOMAN EXAM WITH ROUTINE GYNECOLOGICAL EXAM: Primary | ICD-10-CM

## 2021-12-14 DIAGNOSIS — Z11.3 SCREENING EXAMINATION FOR STD (SEXUALLY TRANSMITTED DISEASE): ICD-10-CM

## 2021-12-14 DIAGNOSIS — Z30.09 COUNSELING FOR BIRTH CONTROL REGARDING INTRAUTERINE DEVICE (IUD): ICD-10-CM

## 2021-12-14 PROCEDURE — 99385 PR PREVENTIVE VISIT,NEW,18-39: ICD-10-PCS | Mod: S$GLB,,, | Performed by: OBSTETRICS & GYNECOLOGY

## 2021-12-14 PROCEDURE — 87591 N.GONORRHOEAE DNA AMP PROB: CPT | Performed by: OBSTETRICS & GYNECOLOGY

## 2021-12-14 PROCEDURE — 87624 HPV HI-RISK TYP POOLED RSLT: CPT | Performed by: OBSTETRICS & GYNECOLOGY

## 2021-12-14 PROCEDURE — 99999 PR PBB SHADOW E&M-EST. PATIENT-LVL III: CPT | Mod: PBBFAC,,, | Performed by: OBSTETRICS & GYNECOLOGY

## 2021-12-14 PROCEDURE — 99999 PR PBB SHADOW E&M-EST. PATIENT-LVL III: ICD-10-PCS | Mod: PBBFAC,,, | Performed by: OBSTETRICS & GYNECOLOGY

## 2021-12-14 PROCEDURE — 99385 PREV VISIT NEW AGE 18-39: CPT | Mod: S$GLB,,, | Performed by: OBSTETRICS & GYNECOLOGY

## 2021-12-14 PROCEDURE — 88175 CYTOPATH C/V AUTO FLUID REDO: CPT | Performed by: OBSTETRICS & GYNECOLOGY

## 2021-12-14 PROCEDURE — 87481 CANDIDA DNA AMP PROBE: CPT | Mod: 59 | Performed by: OBSTETRICS & GYNECOLOGY

## 2021-12-14 PROCEDURE — 87491 CHLMYD TRACH DNA AMP PROBE: CPT | Mod: 59 | Performed by: OBSTETRICS & GYNECOLOGY

## 2021-12-16 LAB
BACTERIAL VAGINOSIS DNA: NEGATIVE
CANDIDA GLABRATA DNA: NEGATIVE
CANDIDA KRUSEI DNA: NEGATIVE
CANDIDA RRNA VAG QL PROBE: NEGATIVE
T VAGINALIS RRNA GENITAL QL PROBE: NEGATIVE

## 2021-12-20 LAB
CLINICAL INFO: ABNORMAL
CYTO CVX: ABNORMAL
CYTOLOGIST CVX/VAG CYTO: ABNORMAL
CYTOLOGIST CVX/VAG CYTO: ABNORMAL
CYTOLOGY CMNT CVX/VAG CYTO-IMP: ABNORMAL
CYTOLOGY PAP THIN PREP EXPLANATION: ABNORMAL
DATE OF PREVIOUS PAP: ABNORMAL
DATE PREVIOUS BX: NO
GEN CATEG CVX/VAG CYTO-IMP: ABNORMAL
HPV I/H RISK 4 DNA CVX QL NAA+PROBE: NOT DETECTED
LMP START DATE: ABNORMAL
MICROORGANISM CVX/VAG CYTO: ABNORMAL
PATHOLOGIST CVX/VAG CYTO: ABNORMAL
SERVICE CMNT-IMP: ABNORMAL
SPECIMEN SOURCE CVX/VAG CYTO: ABNORMAL
STAT OF ADQ CVX/VAG CYTO-IMP: ABNORMAL

## 2021-12-21 LAB
C TRACH DNA SPEC QL NAA+PROBE: NOT DETECTED
N GONORRHOEA DNA SPEC QL NAA+PROBE: NOT DETECTED

## 2022-01-21 ENCOUNTER — OFFICE VISIT (OUTPATIENT)
Dept: OTOLARYNGOLOGY | Facility: CLINIC | Age: 37
End: 2022-01-21
Payer: COMMERCIAL

## 2022-01-21 DIAGNOSIS — J34.89 NASAL OBSTRUCTION: Primary | ICD-10-CM

## 2022-01-21 DIAGNOSIS — J34.3 HYPERTROPHY OF INFERIOR NASAL TURBINATE: ICD-10-CM

## 2022-01-21 DIAGNOSIS — J34.89 CONCHA BULLOSA: ICD-10-CM

## 2022-01-21 DIAGNOSIS — J31.0 CHRONIC RHINITIS: ICD-10-CM

## 2022-01-21 DIAGNOSIS — J34.2 NASAL SEPTAL DEVIATION: ICD-10-CM

## 2022-01-21 PROCEDURE — 99999 PR PBB SHADOW E&M-EST. PATIENT-LVL III: CPT | Mod: PBBFAC,,, | Performed by: NURSE PRACTITIONER

## 2022-01-21 PROCEDURE — 99214 OFFICE O/P EST MOD 30 MIN: CPT | Mod: S$GLB,,, | Performed by: NURSE PRACTITIONER

## 2022-01-21 PROCEDURE — 99214 PR OFFICE/OUTPT VISIT, EST, LEVL IV, 30-39 MIN: ICD-10-PCS | Mod: S$GLB,,, | Performed by: NURSE PRACTITIONER

## 2022-01-21 PROCEDURE — 1160F RVW MEDS BY RX/DR IN RCRD: CPT | Mod: CPTII,S$GLB,, | Performed by: NURSE PRACTITIONER

## 2022-01-21 PROCEDURE — 1159F MED LIST DOCD IN RCRD: CPT | Mod: CPTII,S$GLB,, | Performed by: NURSE PRACTITIONER

## 2022-01-21 PROCEDURE — 1159F PR MEDICATION LIST DOCUMENTED IN MEDICAL RECORD: ICD-10-PCS | Mod: CPTII,S$GLB,, | Performed by: NURSE PRACTITIONER

## 2022-01-21 PROCEDURE — 99999 PR PBB SHADOW E&M-EST. PATIENT-LVL III: ICD-10-PCS | Mod: PBBFAC,,, | Performed by: NURSE PRACTITIONER

## 2022-01-21 PROCEDURE — 1160F PR REVIEW ALL MEDS BY PRESCRIBER/CLIN PHARMACIST DOCUMENTED: ICD-10-PCS | Mod: CPTII,S$GLB,, | Performed by: NURSE PRACTITIONER

## 2022-01-21 RX ORDER — FLUTICASONE PROPIONATE 50 MCG
2 SPRAY, SUSPENSION (ML) NASAL DAILY
Qty: 16 G | Refills: 2 | Status: SHIPPED | OUTPATIENT
Start: 2022-01-21 | End: 2022-04-21

## 2022-01-21 RX ORDER — CETIRIZINE HYDROCHLORIDE 10 MG/1
10 TABLET ORAL DAILY
Qty: 90 TABLET | Refills: 3 | Status: SHIPPED | OUTPATIENT
Start: 2022-01-21 | End: 2023-07-07

## 2022-01-21 RX ORDER — MONTELUKAST SODIUM 10 MG/1
10 TABLET ORAL NIGHTLY
Qty: 90 TABLET | Refills: 3 | Status: SHIPPED | OUTPATIENT
Start: 2022-01-21 | End: 2022-04-21

## 2022-01-21 NOTE — PROGRESS NOTES
Subjective:      Ganesh is a 36 y.o. female who comes for follow-up of sinusitis.  Her last visit with me was on 12/26/2019, but was seen most recently by my colleague Dr. Quintero on 3/25/20. She was at that time planning for sinus surgery, but surgery was postponed due to COVID19 outbreatk. She since been self managing with daily Zyrtec with limited benefit. She continues with sinus pressure, nasal congestion, runny nose and post-nasal drip. She did have allergy testing in 2020 with noted allergy to shellfish. She reports having a history of exercise induced asthma, managed with albuterol inhaler as needed.         The patient's medications, allergies, past medical, surgical, social and family histories were reviewed and updated as appropriate.    A detailed review of systems was obtained with pertinent positives as per the above HPI, and otherwise negative.        Objective:     There were no vitals taken for this visit.       Constitutional:   She is oriented to person, place, and time. Vital signs are normal. She appears well-developed and well-nourished. She appears alert. Normal speech.      Head:  Normocephalic and atraumatic.     Ears:    Right Ear: No lacerations. No drainage, swelling or tenderness. No foreign bodies. No mastoid tenderness. Tympanic membrane is not injected, not scarred, not perforated, not erythematous, not retracted and not bulging. Tympanic membrane mobility is normal. No middle ear effusion. No hemotympanum.   Left Ear: No lacerations. No drainage, swelling or tenderness. No foreign bodies. No mastoid tenderness. Tympanic membrane is not injected, not scarred, not perforated, not erythematous, not retracted and not bulging. Tympanic membrane mobility is normal.  No middle ear effusion. No hemotympanum.     Nose:  Mucosal edema and septal deviation present. No rhinorrhea, nose lacerations, sinus tenderness, nasal septal hematoma or polyps. No epistaxis.  No foreign bodies. Turbinate  hypertrophy.  Right sinus exhibits no maxillary sinus tenderness and no frontal sinus tenderness. Left sinus exhibits no maxillary sinus tenderness and no frontal sinus tenderness.     Mouth/Throat  Oropharynx clear and moist without lesions or asymmetry, normal uvula midline and lips, teeth, and gums normal. No uvula swelling, oral lesions, trismus, mucous membrane lesions or xerostomia. No oropharyngeal exudate, posterior oropharyngeal edema or posterior oropharyngeal erythema.     Neck:  Neck normal without thyromegaly masses, asymmetry, normal tracheal structure, crepitus, and tenderness and no adenopathy.     Psychiatric:   She has a normal mood and affect. Her speech is normal and behavior is normal.     Neurological:   She is alert and oriented to person, place, and time. No cranial nerve deficit.     Skin:   No abrasions, lacerations, lesions, or rashes.       Procedure    None      Data Reviewed    WBC (K/uL)   Date Value   12/02/2020 4.93     Eosinophil % (%)   Date Value   12/02/2020 4.9     Eos # (K/uL)   Date Value   12/02/2020 0.2     Platelets (K/uL)   Date Value   12/02/2020 268     Glucose (mg/dL)   Date Value   12/02/2020 85     IgE (IU/mL)   Date Value   12/07/2020 58       Appticles Sinus CT scan without contrast (1/2/20):  FINDINGS:  Along the anterolateral wall of the left maxillary sinus there is a approximately 1 x 0.7 cm lobulated low-attenuation soft tissue mass likely representing a retention cyst.  No air-fluid levels.  There is no significant mucosal thickening in the left maxillary sinus.     No fluid levels or mucosal thickening or cystic or solid masses are seen in the right maxillary sinus.     The ethmoid sinuses and the sphenoid sinuses are clear.  There is a small ossific nodule likely representing an osteoma or a nonossifying fibroma along the medial wall of the left frontal sinus (image 148 series 3 and image 12 series 601).  No fluid levels in the frontal sinuses or any  significant mucosal thickening.     There is mild isaias bullosa of the middle turbinates bilaterally.  Rest of the turbinates are unremarkable.  No significant nasal septal deviation     There is normal nasopharynx and the parapharyngeal soft tissues.  The bony walls of the orbits demonstrate no significant abnormalities.     There is a small expansile cystic lesion at the tip of the root of the right maxillary canine tooth, likely representing apical root cyst.  (Image 47 series 3).     Symmetric and normal appearance of the ocular globes.  There is no retro-orbital masses.  No extraconal or intraconal masses.  The visualized portions of the temporal bones demonstrate no significant abnormalities.     Impression:     1. Left maxillary antral retention cyst as above.  2. Isaias bullosa of the middle turbinates bilaterally as above.  3. Right canine apical root cyst as above.      Assessment:     1. Nasal obstruction    2. Nasal septal deviation    3. Hypertrophy of inferior nasal turbinate    4. Isaias bullosa    5. Chronic rhinitis         Plan:     I have discussed options for sinus management including surgical options as she was initially planned with Dr. Quintero vs continuing medical management. She would like to defer surgery for now. I am adding nasal steroid and montelukast to her regimen. She will follow up with me on an as needed basis.     -     Rx montelukast (SINGULAIR) 10 mg tablet; Take 1 tablet (10 mg total) by mouth every evening.  -     Rx fluticasone propionate (FLONASE) 50 mcg/actuation nasal spray; 2 sprays (100 mcg total) by Each Nostril route once daily.  -     Refill RX cetirizine (ZYRTEC) 10 MG tablet; Take 1 tablet (10 mg total) by mouth once daily.        Follow up if symptoms worsen or fail to improve.

## 2022-01-21 NOTE — PATIENT INSTRUCTIONS
-     montelukast (SINGULAIR) 10 mg tablet; Take 1 tablet (10 mg total) by mouth every evening.  -     fluticasone propionate (FLONASE) 50 mcg/actuation nasal spray; 2 sprays (100 mcg total) by Each Nostril route once daily.  -     cetirizine (ZYRTEC) 10 MG tablet; Take 1 tablet (10 mg total) by mouth once daily.

## 2022-01-25 ENCOUNTER — PATIENT MESSAGE (OUTPATIENT)
Dept: OTOLARYNGOLOGY | Facility: CLINIC | Age: 37
End: 2022-01-25
Payer: COMMERCIAL

## 2022-01-25 DIAGNOSIS — J32.8 OTHER CHRONIC SINUSITIS: Primary | ICD-10-CM

## 2022-02-01 ENCOUNTER — OFFICE VISIT (OUTPATIENT)
Dept: OTOLARYNGOLOGY | Facility: CLINIC | Age: 37
End: 2022-02-01
Payer: COMMERCIAL

## 2022-02-01 VITALS — WEIGHT: 154.13 LBS | HEIGHT: 64 IN | BODY MASS INDEX: 26.31 KG/M2

## 2022-02-01 DIAGNOSIS — J34.2 NASAL SEPTAL DEVIATION: ICD-10-CM

## 2022-02-01 DIAGNOSIS — J34.89 CONCHA BULLOSA: ICD-10-CM

## 2022-02-01 DIAGNOSIS — J34.3 HYPERTROPHY OF INFERIOR NASAL TURBINATE: Primary | ICD-10-CM

## 2022-02-01 PROCEDURE — 1159F PR MEDICATION LIST DOCUMENTED IN MEDICAL RECORD: ICD-10-PCS | Mod: CPTII,S$GLB,, | Performed by: OTOLARYNGOLOGY

## 2022-02-01 PROCEDURE — 31231 NASAL/SINUS ENDOSCOPY: ICD-10-PCS | Mod: S$GLB,,, | Performed by: OTOLARYNGOLOGY

## 2022-02-01 PROCEDURE — 1160F RVW MEDS BY RX/DR IN RCRD: CPT | Mod: CPTII,S$GLB,, | Performed by: OTOLARYNGOLOGY

## 2022-02-01 PROCEDURE — 99214 PR OFFICE/OUTPT VISIT, EST, LEVL IV, 30-39 MIN: ICD-10-PCS | Mod: 25,S$GLB,, | Performed by: OTOLARYNGOLOGY

## 2022-02-01 PROCEDURE — 1160F PR REVIEW ALL MEDS BY PRESCRIBER/CLIN PHARMACIST DOCUMENTED: ICD-10-PCS | Mod: CPTII,S$GLB,, | Performed by: OTOLARYNGOLOGY

## 2022-02-01 PROCEDURE — 99999 PR PBB SHADOW E&M-EST. PATIENT-LVL III: CPT | Mod: PBBFAC,,, | Performed by: OTOLARYNGOLOGY

## 2022-02-01 PROCEDURE — 3008F BODY MASS INDEX DOCD: CPT | Mod: CPTII,S$GLB,, | Performed by: OTOLARYNGOLOGY

## 2022-02-01 PROCEDURE — 1159F MED LIST DOCD IN RCRD: CPT | Mod: CPTII,S$GLB,, | Performed by: OTOLARYNGOLOGY

## 2022-02-01 PROCEDURE — 99999 PR PBB SHADOW E&M-EST. PATIENT-LVL III: ICD-10-PCS | Mod: PBBFAC,,, | Performed by: OTOLARYNGOLOGY

## 2022-02-01 PROCEDURE — 3008F PR BODY MASS INDEX (BMI) DOCUMENTED: ICD-10-PCS | Mod: CPTII,S$GLB,, | Performed by: OTOLARYNGOLOGY

## 2022-02-01 PROCEDURE — 99214 OFFICE O/P EST MOD 30 MIN: CPT | Mod: 25,S$GLB,, | Performed by: OTOLARYNGOLOGY

## 2022-02-01 PROCEDURE — 31231 NASAL ENDOSCOPY DX: CPT | Mod: S$GLB,,, | Performed by: OTOLARYNGOLOGY

## 2022-02-01 NOTE — PROCEDURES
Nasal/sinus endoscopy    Date/Time: 2/1/2022 9:00 AM  Performed by: Jenaro Coley MD  Authorized by: Jenaro Coley MD     Consent Done?:  Yes (Verbal)  Anesthesia:     Local anesthetic:  Lidocaine 4% and Rohit-Synephrine 1/2%    Patient tolerance:  Patient tolerated the procedure well with no immediate complications  Nose:     Procedure Performed:  Nasal Endoscopy  External:      No external nasal deformity  Intranasal:      Mucosa no polyps     Mucosa ulcers not present     No mucosa lesions present     Enlarged turbinates     Septum gross deformity  Nasopharynx:      No mucosa lesions     Adenoids not present     Posterior choanae patent     Eustachian tube patent     No polyps or purulence

## 2022-02-01 NOTE — PROGRESS NOTES
Subjective:      Ganesh Schroeder is a 36 y.o. female who was referred to me by Kathrine Ovalle in consultation for nasal congestion.    She relates a long history for many years of bilateral, perennial, moderately severe nasal congestion, which is worse at night when lying down.  She notes associated nonpurulent nasal discharge, midfacial pressure, and pruritic symptoms.  These have not improved with several months of fluticasone spray, zyrtec and saline.  She denies hyposmia, snoring, aural fullness or notable sinus infections.  She was reportedly planning for nasal surgery with Dr. Quintero in spring 2020 when the pandemic intervened.    Current sinonasal medications include Flonase, Zyrtec, saline and Singulair.  The last course of antibiotics was a long time ago.  She does not regularly use nasal decongestant sprays.    She recalls previously having allergy testing, which was reportedly all negative except for shellfish.    She denies a history of asthma.    She denies a history of reflux symptoms.      She denies a diagnosis of obstructive sleep apnea.     She has not had sinonasal surgery.  She has not had a tonsillectomy.    She does not recall a prior history of nasal trauma.    SNOT-22 score: : (P) 45  NOSE score:: (P) 60%  ETDQ-7 score:: (P) 2.9      Past Medical History  She has a past medical history of Abnormal Pap smear of cervix, Angio-edema, and Urticaria.    Past Surgical History  She has a past surgical history that includes Pelvic laparoscopy; Salpingectomy (Right, );  section (2010, 2014); Umbilical hernia repair (2016); and Robot-assisted repair of ventral hernia using da Lora Xi (N/A, 3/7/2019).    Family History  Her family history includes No Known Problems in her child, child, father, maternal grandfather, maternal grandmother, mother, paternal grandfather, paternal grandmother, and sister.    Social History  She reports that she has never smoked. She has  "never used smokeless tobacco. She reports that she does not drink alcohol and does not use drugs.    Allergies  She has No Known Allergies.    Medications   She has a current medication list which includes the following prescription(s): cetirizine, ergocalciferol, fluticasone propionate, levonorgestrel, montelukast, and epinephrine.    Review of Systems     Constitutional: Negative for appetite change, chills, fatigue, fever and unexpected weight loss.      HENT: Positive for sinus infection and sinus pressure.  Negative for ear discharge, ear infection, ear pain, facial swelling, hearing loss, mouth sores, nosebleeds, postnasal drip, ringing in the ears, runny nose, sore throat, stuffy nose, tonsil infection, dental problems, trouble swallowing and voice change.      Eyes:  Negative for change in eyesight, eye drainage, eye itching and photophobia.     Respiratory:  Negative for cough, shortness of breath, sleep apnea, snoring and wheezing.      Cardiovascular:  Negative for chest pain, foot swelling, irregular heartbeat and swollen veins.     Gastrointestinal:  Negative for abdominal pain, acid reflux, constipation, diarrhea, heartburn and vomiting.     Genitourinary: Negative for difficulty urinating, sexual problems and frequent urination.     Musc: Negative for aching joints, aching muscles, back pain and neck pain.     Skin: Negative for rash.     Allergy: Positive for food allergies and seasonal allergies.     Endocrine: Negative for cold intolerance and heat intolerance.      Neurological: Positive for headaches. Negative for dizziness, light-headedness, seizures and tremors.      Hematologic: Negative for bruises/bleeds easily and swollen glands.      Psychiatric: Negative for decreased concentration, depression, nervous/anxious and sleep disturbance.               Objective:     Ht 5' 4" (1.626 m)   Wt 69.9 kg (154 lb 1.6 oz)   BMI 26.45 kg/m²        Constitutional:   She appears well-developed. She is " cooperative. Normal speech.  No hoarse voice.      Head:  Normocephalic. Salivary glands normal.  Facial strength is normal.      Ears:    Right Ear: No drainage or tenderness. Tympanic membrane is not perforated. Tympanic membrane mobility is normal. No middle ear effusion. No decreased hearing is noted.   Left Ear: No drainage or tenderness. Tympanic membrane is not perforated. Tympanic membrane mobility is normal.  No middle ear effusion. No decreased hearing is noted.     Nose:  Septal deviation present. No mucosal edema, rhinorrhea or polyps. No epistaxis. Turbinate hypertrophy.  Turbinates normal and no turbinate masses.  Right sinus exhibits no maxillary sinus tenderness and no frontal sinus tenderness. Left sinus exhibits no maxillary sinus tenderness and no frontal sinus tenderness.   Negative modified Leisa maneuver  Large bony turbinate hypertrophy after decongestion    Mouth/Throat  Oropharynx clear and moist without lesions or asymmetry and normal uvula midline. She does not have dentures. Normal dentition. No oral lesions or mucous membrane lesions. No oropharyngeal exudate or posterior oropharyngeal erythema. Tonsils present, +1.  Mirror exam not performed due to patient tolerance.  Mirror exam not performed due to patient tolerance.    +2 MMP      Neck:  Neck normal without thyromegaly masses, asymmetry, normal tracheal structure, crepitus, and tenderness, thyroid normal, trachea normal and no adenopathy. Normal range of motion present.     She has no cervical adenopathy.     Cardiovascular:   Regular rhythm.      Pulmonary/Chest:   Effort normal.     Psychiatric:   She has a normal mood and affect. Her speech is normal and behavior is normal.     Neurological:   No cranial nerve deficit.     Skin:   No rash noted.       Procedure    Nasal endoscopy performed.  See procedure note.        Data Reviewed    WBC (K/uL)   Date Value   12/02/2020 4.93     Eosinophil % (%)   Date Value   12/02/2020 4.9      Eos # (K/uL)   Date Value   12/02/2020 0.2     Platelets (K/uL)   Date Value   12/02/2020 268     Glucose (mg/dL)   Date Value   12/02/2020 85     IgE (IU/mL)   Date Value   12/07/2020 58       I independently reviewed the images of the CT sinuses dated 1/20/20. Pertinent findings include clear sinsues except for an incidental left maxillary cyst, large occlusive inferior turbinates, and bilateral isaias bullosae.       Assessment:     1. Hypertrophy of inferior nasal turbinate    2. Nasal septal deviation    3. Isaias bullosa         Plan:     I had a long discussion with the patient regarding her condition and the further workup and management options.  She would benefit from septoplasty and turbinate reduction and endoscopic isaias bullosae resection for the treatment of her condition.  I discussed the risks, benefits and alternatives to surgery with the patient, as well as the expected postoperative course.  I gave her the opportunity to ask questions and I answered all of them.  I provided relevant printed information on her condition for her to review at home.  Same-day discharge is anticipated.  She may have anesthesia triage by telephone.   The surgery will be tentatively scheduled for sometime around [place]as break.  She will need to return for a postoperative visit 1 week after surgery.     Follow up for surgery.

## 2022-02-08 ENCOUNTER — TELEPHONE (OUTPATIENT)
Dept: OTOLARYNGOLOGY | Facility: CLINIC | Age: 37
End: 2022-02-08
Payer: COMMERCIAL

## 2022-02-09 ENCOUNTER — TELEPHONE (OUTPATIENT)
Dept: OTOLARYNGOLOGY | Facility: CLINIC | Age: 37
End: 2022-02-09
Payer: COMMERCIAL

## 2022-02-09 NOTE — TELEPHONE ENCOUNTER
Spoke with patient regarding scheduling surgery with . Patient won't be able to do 3/11 for surgery. She is trying to schedule her surgery around the school break being that she is a . She stated she may have to schedule in April around the easter break.    I mentioned we may be able to offer April 8th is the week of Easter will not be available. Will discuss with  some possible dates and reach back out to patient for scheduling.       ----- Message from Kanu Hall sent at 2/9/2022  8:58 AM CST -----  Regarding: Pt Inquiry  Pt returning missed call from [facility] in regards to scheduling surgery. Requesting a call back.        [phone] (home)         
no chest pain

## 2022-03-10 ENCOUNTER — TELEPHONE (OUTPATIENT)
Dept: OTOLARYNGOLOGY | Facility: CLINIC | Age: 37
End: 2022-03-10
Payer: COMMERCIAL

## 2022-03-16 ENCOUNTER — TELEPHONE (OUTPATIENT)
Dept: OBSTETRICS AND GYNECOLOGY | Facility: CLINIC | Age: 37
End: 2022-03-16
Payer: COMMERCIAL

## 2022-03-16 NOTE — TELEPHONE ENCOUNTER
Does not look like anyone from this office called the patient          ----- Message from Rosalind Adamson sent at 3/16/2022  3:52 PM CDT -----  Type: Patient Call Back    Who called: self     What is the request in detail: Pt is returning call     Can the clinic reply by [facility]?    Would the patient rather a call back or a response via [facility]? Call     Best call back number: [phone] (home)

## 2022-05-03 ENCOUNTER — OFFICE VISIT (OUTPATIENT)
Dept: INTERNAL MEDICINE | Facility: CLINIC | Age: 37
End: 2022-05-03
Payer: COMMERCIAL

## 2022-05-03 ENCOUNTER — LAB VISIT (OUTPATIENT)
Dept: LAB | Facility: HOSPITAL | Age: 37
End: 2022-05-03
Attending: INTERNAL MEDICINE
Payer: COMMERCIAL

## 2022-05-03 VITALS
BODY MASS INDEX: 26.27 KG/M2 | HEART RATE: 99 BPM | WEIGHT: 153.88 LBS | DIASTOLIC BLOOD PRESSURE: 70 MMHG | OXYGEN SATURATION: 97 % | SYSTOLIC BLOOD PRESSURE: 100 MMHG | HEIGHT: 64 IN

## 2022-05-03 DIAGNOSIS — Z00.00 HEALTH CARE MAINTENANCE: ICD-10-CM

## 2022-05-03 DIAGNOSIS — R06.09 DYSPNEA ON EXERTION: ICD-10-CM

## 2022-05-03 DIAGNOSIS — R07.9 CHEST PAIN, EXERTIONAL: ICD-10-CM

## 2022-05-03 DIAGNOSIS — Z00.00 HEALTH CARE MAINTENANCE: Primary | ICD-10-CM

## 2022-05-03 LAB
25(OH)D3+25(OH)D2 SERPL-MCNC: 16 NG/ML (ref 30–96)
ALBUMIN SERPL BCP-MCNC: 4.2 G/DL (ref 3.5–5.2)
ALP SERPL-CCNC: 59 U/L (ref 55–135)
ALT SERPL W/O P-5'-P-CCNC: 9 U/L (ref 10–44)
ANION GAP SERPL CALC-SCNC: 7 MMOL/L (ref 8–16)
AST SERPL-CCNC: 18 U/L (ref 10–40)
BASOPHILS # BLD AUTO: 0.02 K/UL (ref 0–0.2)
BASOPHILS NFR BLD: 0.4 % (ref 0–1.9)
BILIRUB SERPL-MCNC: 0.5 MG/DL (ref 0.1–1)
BUN SERPL-MCNC: 7 MG/DL (ref 6–20)
CALCIUM SERPL-MCNC: 9.6 MG/DL (ref 8.7–10.5)
CHLORIDE SERPL-SCNC: 103 MMOL/L (ref 95–110)
CHOLEST SERPL-MCNC: 203 MG/DL (ref 120–199)
CHOLEST/HDLC SERPL: 2.9 {RATIO} (ref 2–5)
CO2 SERPL-SCNC: 26 MMOL/L (ref 23–29)
CREAT SERPL-MCNC: 0.9 MG/DL (ref 0.5–1.4)
DIFFERENTIAL METHOD: NORMAL
EOSINOPHIL # BLD AUTO: 0.2 K/UL (ref 0–0.5)
EOSINOPHIL NFR BLD: 3 % (ref 0–8)
ERYTHROCYTE [DISTWIDTH] IN BLOOD BY AUTOMATED COUNT: 12.6 % (ref 11.5–14.5)
EST. GFR  (AFRICAN AMERICAN): >60 ML/MIN/1.73 M^2
EST. GFR  (NON AFRICAN AMERICAN): >60 ML/MIN/1.73 M^2
ESTIMATED AVG GLUCOSE: 105 MG/DL (ref 68–131)
GLUCOSE SERPL-MCNC: 75 MG/DL (ref 70–110)
HBA1C MFR BLD: 5.3 % (ref 4–5.6)
HCT VFR BLD AUTO: 39.2 % (ref 37–48.5)
HDLC SERPL-MCNC: 71 MG/DL (ref 40–75)
HDLC SERPL: 35 % (ref 20–50)
HGB BLD-MCNC: 12.9 G/DL (ref 12–16)
IMM GRANULOCYTES # BLD AUTO: 0.01 K/UL (ref 0–0.04)
IMM GRANULOCYTES NFR BLD AUTO: 0.2 % (ref 0–0.5)
LDLC SERPL CALC-MCNC: 124.6 MG/DL (ref 63–159)
LYMPHOCYTES # BLD AUTO: 1.2 K/UL (ref 1–4.8)
LYMPHOCYTES NFR BLD: 24.2 % (ref 18–48)
MCH RBC QN AUTO: 30.4 PG (ref 27–31)
MCHC RBC AUTO-ENTMCNC: 32.9 G/DL (ref 32–36)
MCV RBC AUTO: 92 FL (ref 82–98)
MONOCYTES # BLD AUTO: 0.4 K/UL (ref 0.3–1)
MONOCYTES NFR BLD: 7.2 % (ref 4–15)
NEUTROPHILS # BLD AUTO: 3.3 K/UL (ref 1.8–7.7)
NEUTROPHILS NFR BLD: 65 % (ref 38–73)
NONHDLC SERPL-MCNC: 132 MG/DL
NRBC BLD-RTO: 0 /100 WBC
PLATELET # BLD AUTO: 278 K/UL (ref 150–450)
PMV BLD AUTO: 10.3 FL (ref 9.2–12.9)
POTASSIUM SERPL-SCNC: 4 MMOL/L (ref 3.5–5.1)
PROT SERPL-MCNC: 7.6 G/DL (ref 6–8.4)
RBC # BLD AUTO: 4.25 M/UL (ref 4–5.4)
SODIUM SERPL-SCNC: 136 MMOL/L (ref 136–145)
TRIGL SERPL-MCNC: 37 MG/DL (ref 30–150)
TSH SERPL DL<=0.005 MIU/L-ACNC: 1.1 UIU/ML (ref 0.4–4)
WBC # BLD AUTO: 5.01 K/UL (ref 3.9–12.7)

## 2022-05-03 PROCEDURE — 3008F PR BODY MASS INDEX (BMI) DOCUMENTED: ICD-10-PCS | Mod: CPTII,S$GLB,, | Performed by: INTERNAL MEDICINE

## 2022-05-03 PROCEDURE — 1159F MED LIST DOCD IN RCRD: CPT | Mod: CPTII,S$GLB,, | Performed by: INTERNAL MEDICINE

## 2022-05-03 PROCEDURE — 1159F PR MEDICATION LIST DOCUMENTED IN MEDICAL RECORD: ICD-10-PCS | Mod: CPTII,S$GLB,, | Performed by: INTERNAL MEDICINE

## 2022-05-03 PROCEDURE — 80053 COMPREHEN METABOLIC PANEL: CPT | Performed by: INTERNAL MEDICINE

## 2022-05-03 PROCEDURE — 84443 ASSAY THYROID STIM HORMONE: CPT | Performed by: INTERNAL MEDICINE

## 2022-05-03 PROCEDURE — 82306 VITAMIN D 25 HYDROXY: CPT | Performed by: INTERNAL MEDICINE

## 2022-05-03 PROCEDURE — 3008F BODY MASS INDEX DOCD: CPT | Mod: CPTII,S$GLB,, | Performed by: INTERNAL MEDICINE

## 2022-05-03 PROCEDURE — 83036 HEMOGLOBIN GLYCOSYLATED A1C: CPT | Performed by: INTERNAL MEDICINE

## 2022-05-03 PROCEDURE — 80061 LIPID PANEL: CPT | Performed by: INTERNAL MEDICINE

## 2022-05-03 PROCEDURE — 1160F RVW MEDS BY RX/DR IN RCRD: CPT | Mod: CPTII,S$GLB,, | Performed by: INTERNAL MEDICINE

## 2022-05-03 PROCEDURE — 85025 COMPLETE CBC W/AUTO DIFF WBC: CPT | Performed by: INTERNAL MEDICINE

## 2022-05-03 PROCEDURE — 99395 PR PREVENTIVE VISIT,EST,18-39: ICD-10-PCS | Mod: S$GLB,,, | Performed by: INTERNAL MEDICINE

## 2022-05-03 PROCEDURE — 3074F SYST BP LT 130 MM HG: CPT | Mod: CPTII,S$GLB,, | Performed by: INTERNAL MEDICINE

## 2022-05-03 PROCEDURE — 3074F PR MOST RECENT SYSTOLIC BLOOD PRESSURE < 130 MM HG: ICD-10-PCS | Mod: CPTII,S$GLB,, | Performed by: INTERNAL MEDICINE

## 2022-05-03 PROCEDURE — 99395 PREV VISIT EST AGE 18-39: CPT | Mod: S$GLB,,, | Performed by: INTERNAL MEDICINE

## 2022-05-03 PROCEDURE — 99999 PR PBB SHADOW E&M-EST. PATIENT-LVL III: ICD-10-PCS | Mod: PBBFAC,,, | Performed by: INTERNAL MEDICINE

## 2022-05-03 PROCEDURE — 3078F DIAST BP <80 MM HG: CPT | Mod: CPTII,S$GLB,, | Performed by: INTERNAL MEDICINE

## 2022-05-03 PROCEDURE — 1160F PR REVIEW ALL MEDS BY PRESCRIBER/CLIN PHARMACIST DOCUMENTED: ICD-10-PCS | Mod: CPTII,S$GLB,, | Performed by: INTERNAL MEDICINE

## 2022-05-03 PROCEDURE — 3078F PR MOST RECENT DIASTOLIC BLOOD PRESSURE < 80 MM HG: ICD-10-PCS | Mod: CPTII,S$GLB,, | Performed by: INTERNAL MEDICINE

## 2022-05-03 PROCEDURE — 36415 COLL VENOUS BLD VENIPUNCTURE: CPT | Performed by: INTERNAL MEDICINE

## 2022-05-03 PROCEDURE — 99999 PR PBB SHADOW E&M-EST. PATIENT-LVL III: CPT | Mod: PBBFAC,,, | Performed by: INTERNAL MEDICINE

## 2022-05-03 NOTE — PROGRESS NOTES
"Subjective:       Patient ID: Ganesh Schroeder is a 36 y.o. female.    Chief Complaint: Annual Exam    HPI   Ganesh Schroeder is a 36 y.o. female here for a yearly preventative healthcare visit.   She last saw me in 2018.  Hx umbilical hernia.   Saw Dr. Coley in Feb '22 - "would benefit from septoplasty and turbinate reduction and endoscopic isaias bullosae resection for the treatment of her condition."  Struggling with sinuses and allergies.   Plans for new IUD, her gyn was out on maternity leave so hasn't done yet.  Her son is almost 8 and has IUD from after his birth.  She got a peloton and is experiencing chest pain and overly short of breath when exercising. Does not get symptoms when power walking. Resolves with rest. Chest pain seems to occur when HR in zone 5 (over 173.)  Review of Systems   Constitutional: Negative for fever.   Respiratory: Negative for shortness of breath.    Cardiovascular: Negative for chest pain.   Musculoskeletal: Negative.    Skin: Negative.        Objective:   /70 (BP Location: Right arm, Patient Position: Sitting, BP Method: Medium (Manual))   Pulse 99   Ht 5' 4" (1.626 m)   Wt 69.8 kg (153 lb 14.1 oz)   SpO2 97%   BMI 26.41 kg/m²      Physical Exam  Vitals reviewed.   Constitutional:       Appearance: She is well-developed.   HENT:      Head: Normocephalic and atraumatic.   Eyes:      Conjunctiva/sclera: Conjunctivae normal.      Pupils: Pupils are equal, round, and reactive to light.   Neck:      Thyroid: No thyromegaly.   Cardiovascular:      Rate and Rhythm: Normal rate and regular rhythm.      Heart sounds: Normal heart sounds. No murmur heard.  Pulmonary:      Effort: Pulmonary effort is normal. No respiratory distress.      Breath sounds: Normal breath sounds. No wheezing.   Abdominal:      General: There is no distension.      Palpations: Abdomen is soft.      Tenderness: There is no abdominal tenderness. There is no rebound.   Musculoskeletal:         General: No " swelling or deformity. Normal range of motion.      Cervical back: Neck supple.   Lymphadenopathy:      Cervical: No cervical adenopathy.   Skin:     General: Skin is warm and dry.      Findings: No rash.   Neurological:      Mental Status: She is alert and oriented to person, place, and time.   Psychiatric:         Thought Content: Thought content normal.         Judgment: Judgment normal.         Assessment:       1. Health care maintenance    2. Dyspnea on exertion    3. Chest pain, exertional        Plan:       Gaensh was seen today for annual exam.    Diagnoses and all orders for this visit:    Health care maintenance  -     CBC Auto Differential; Future  -     Hemoglobin A1C; Future  -     Comprehensive Metabolic Panel; Future  -     Lipid Panel; Future  -     TSH; Future  -     Vitamin D; Future    Dyspnea on exertion  Chest pain, exertional  -     Stress Echo Which stress agent will be used? Treadmill Exercise; Color Flow Doppler? No; Future  -     EKG 12-lead; Future

## 2022-05-13 ENCOUNTER — TELEPHONE (OUTPATIENT)
Dept: OTOLARYNGOLOGY | Facility: CLINIC | Age: 37
End: 2022-05-13
Payer: COMMERCIAL

## 2022-05-13 DIAGNOSIS — J34.3 HYPERTROPHY OF INFERIOR NASAL TURBINATE: Primary | ICD-10-CM

## 2022-05-13 DIAGNOSIS — J34.2 NASAL SEPTAL DEVIATION: ICD-10-CM

## 2022-05-13 DIAGNOSIS — J34.89 CONCHA BULLOSA: ICD-10-CM

## 2022-05-24 ENCOUNTER — HOSPITAL ENCOUNTER (OUTPATIENT)
Dept: CARDIOLOGY | Facility: HOSPITAL | Age: 37
Discharge: HOME | End: 2022-05-24
Attending: INTERNAL MEDICINE
Payer: COMMERCIAL

## 2022-05-24 ENCOUNTER — HOSPITAL ENCOUNTER (OUTPATIENT)
Dept: CARDIOLOGY | Facility: CLINIC | Age: 37
Discharge: HOME | End: 2022-05-24
Payer: COMMERCIAL

## 2022-05-24 VITALS — BODY MASS INDEX: 25.95 KG/M2 | HEIGHT: 64 IN | WEIGHT: 152 LBS

## 2022-05-24 DIAGNOSIS — R07.9 CHEST PAIN, EXERTIONAL: ICD-10-CM

## 2022-05-24 DIAGNOSIS — R06.09 DYSPNEA ON EXERTION: ICD-10-CM

## 2022-05-24 LAB
ASCENDING AORTA: 2.87 CM
BSA FOR ECHO PROCEDURE: 1.76 M2
CV ECHO LV RWT: 0.38 CM
CV STRESS BASE HR: 56 BPM
DIASTOLIC BLOOD PRESSURE: 84 MMHG
DOP CALC LVOT AREA: 3 CM2
DOP CALC LVOT DIAMETER: 1.97 CM
DOP CALC LVOT PEAK VEL: 0.93 M/S
DOP CALC LVOT STROKE VOLUME: 63.79 CM3
DOP CALCLVOT PEAK VEL VTI: 20.94 CM
E WAVE DECELERATION TIME: 194.9 MSEC
E/A RATIO: 1.54
E/E' RATIO: 6.4 M/S
ECHO LV POSTERIOR WALL: 0.8 CM (ref 0.6–1.1)
EJECTION FRACTION: 60 %
FRACTIONAL SHORTENING: 38 % (ref 28–44)
INTERVENTRICULAR SEPTUM: 0.8 CM (ref 0.6–1.1)
IVRT: 87.54 MSEC
LA MAJOR: 4.81 CM
LA MINOR: 5.49 CM
LA WIDTH: 3.43 CM
LEFT ATRIUM SIZE: 2.8 CM
LEFT ATRIUM VOLUME INDEX: 24.1 ML/M2
LEFT ATRIUM VOLUME: 41.86 CM3
LEFT INTERNAL DIMENSION IN SYSTOLE: 2.59 CM (ref 2.1–4)
LEFT VENTRICLE DIASTOLIC VOLUME INDEX: 37.47 ML/M2
LEFT VENTRICLE DIASTOLIC VOLUME: 65.2 ML
LEFT VENTRICLE MASS INDEX: 58 G/M2
LEFT VENTRICLE SYSTOLIC VOLUME INDEX: 14.1 ML/M2
LEFT VENTRICLE SYSTOLIC VOLUME: 24.45 ML
LEFT VENTRICULAR INTERNAL DIMENSION IN DIASTOLE: 4.2 CM (ref 3.5–6)
LEFT VENTRICULAR MASS: 101.29 G
LV LATERAL E/E' RATIO: 6.15 M/S
LV SEPTAL E/E' RATIO: 6.67 M/S
MV A" WAVE DURATION": 10.28 MSEC
MV PEAK A VEL: 0.52 M/S
MV PEAK E VEL: 0.8 M/S
MV STENOSIS PRESSURE HALF TIME: 56.52 MS
MV VALVE AREA P 1/2 METHOD: 3.89 CM2
OHS CV CPX 1 MINUTE RECOVERY HEART RATE: 153 BPM
OHS CV CPX 85 PERCENT MAX PREDICTED HEART RATE MALE: 147
OHS CV CPX ESTIMATED METS: 13
OHS CV CPX MAX PREDICTED HEART RATE: 173
OHS CV CPX PATIENT IS FEMALE: 1
OHS CV CPX PATIENT IS MALE: 0
OHS CV CPX PEAK DIASTOLIC BLOOD PRESSURE: 69 MMHG
OHS CV CPX PEAK HEAR RATE: 187 BPM
OHS CV CPX PEAK RATE PRESSURE PRODUCT: NORMAL
OHS CV CPX PEAK SYSTOLIC BLOOD PRESSURE: 168 MMHG
OHS CV CPX PERCENT MAX PREDICTED HEART RATE ACHIEVED: 108
OHS CV CPX RATE PRESSURE PRODUCT PRESENTING: 7504
PISA TR MAX VEL: 2.15 M/S
PULM VEIN S/D RATIO: 1.03
PV PEAK D VEL: 0.37 M/S
PV PEAK S VEL: 0.38 M/S
RA MAJOR: 4.26 CM
RA PRESSURE: 8 MMHG
RA WIDTH: 3.42 CM
RIGHT VENTRICULAR END-DIASTOLIC DIMENSION: 3.55 CM
RV TISSUE DOPPLER FREE WALL SYSTOLIC VELOCITY 1 (APICAL 4 CHAMBER VIEW): 11.69 CM/S
SINUS: 3.1 CM
STJ: 2.25 CM
STRESS ECHO POST EXERCISE DUR MIN: 8 MINUTES
STRESS ECHO POST EXERCISE DUR SEC: 0 SECONDS
SYSTOLIC BLOOD PRESSURE: 134 MMHG
TDI LATERAL: 0.13 M/S
TDI SEPTAL: 0.12 M/S
TDI: 0.13 M/S
TR MAX PG: 18 MMHG
TRICUSPID ANNULAR PLANE SYSTOLIC EXCURSION: 2.04 CM
TV REST PULMONARY ARTERY PRESSURE: 26 MMHG

## 2022-05-24 PROCEDURE — 93005 ELECTROCARDIOGRAM TRACING: CPT | Mod: S$GLB,,, | Performed by: INTERNAL MEDICINE

## 2022-05-24 PROCEDURE — 93005 EKG 12-LEAD: ICD-10-PCS | Mod: S$GLB,,, | Performed by: INTERNAL MEDICINE

## 2022-05-24 PROCEDURE — 93351 STRESS TTE COMPLETE: CPT

## 2022-05-24 PROCEDURE — 93010 ELECTROCARDIOGRAM REPORT: CPT | Mod: S$GLB,,, | Performed by: INTERNAL MEDICINE

## 2022-05-24 PROCEDURE — 93351 STRESS ECHO (CUPID ONLY): ICD-10-PCS | Mod: 26,,, | Performed by: INTERNAL MEDICINE

## 2022-05-24 PROCEDURE — 93351 STRESS TTE COMPLETE: CPT | Mod: 26,,, | Performed by: INTERNAL MEDICINE

## 2022-05-24 PROCEDURE — 93010 EKG 12-LEAD: ICD-10-PCS | Mod: S$GLB,,, | Performed by: INTERNAL MEDICINE

## 2022-05-31 ENCOUNTER — PATIENT MESSAGE (OUTPATIENT)
Dept: SURGERY | Facility: HOSPITAL | Age: 37
End: 2022-05-31
Payer: COMMERCIAL

## 2022-05-31 ENCOUNTER — PATIENT MESSAGE (OUTPATIENT)
Dept: OBSTETRICS AND GYNECOLOGY | Facility: CLINIC | Age: 37
End: 2022-05-31
Payer: COMMERCIAL

## 2022-06-01 ENCOUNTER — TELEPHONE (OUTPATIENT)
Dept: OBSTETRICS AND GYNECOLOGY | Facility: CLINIC | Age: 37
End: 2022-06-01
Payer: COMMERCIAL

## 2022-06-02 ENCOUNTER — TELEPHONE (OUTPATIENT)
Dept: OBSTETRICS AND GYNECOLOGY | Facility: CLINIC | Age: 37
End: 2022-06-02
Payer: COMMERCIAL

## 2022-06-08 ENCOUNTER — PROCEDURE VISIT (OUTPATIENT)
Dept: OBSTETRICS AND GYNECOLOGY | Facility: CLINIC | Age: 37
End: 2022-06-08
Payer: COMMERCIAL

## 2022-06-08 VITALS
SYSTOLIC BLOOD PRESSURE: 108 MMHG | DIASTOLIC BLOOD PRESSURE: 72 MMHG | BODY MASS INDEX: 26.47 KG/M2 | WEIGHT: 154.19 LBS

## 2022-06-08 DIAGNOSIS — Z30.433 ENCOUNTER FOR REMOVAL AND REINSERTION OF INTRAUTERINE CONTRACEPTIVE DEVICE (IUD): Primary | ICD-10-CM

## 2022-06-08 PROCEDURE — 58300 INSERTION OF IUD: ICD-10-PCS | Mod: S$GLB,,, | Performed by: OBSTETRICS & GYNECOLOGY

## 2022-06-08 PROCEDURE — 81025 URINE PREGNANCY TEST: CPT | Mod: S$GLB,,, | Performed by: OBSTETRICS & GYNECOLOGY

## 2022-06-08 PROCEDURE — 58301 REMOVE INTRAUTERINE DEVICE: CPT | Mod: S$GLB,,, | Performed by: OBSTETRICS & GYNECOLOGY

## 2022-06-08 PROCEDURE — 81025 PR  URINE PREGNANCY TEST: ICD-10-PCS | Mod: S$GLB,,, | Performed by: OBSTETRICS & GYNECOLOGY

## 2022-06-08 PROCEDURE — 58300 INSERT INTRAUTERINE DEVICE: CPT | Mod: S$GLB,,, | Performed by: OBSTETRICS & GYNECOLOGY

## 2022-06-08 PROCEDURE — 58301 REMOVAL OF IUD: ICD-10-PCS | Mod: S$GLB,,, | Performed by: OBSTETRICS & GYNECOLOGY

## 2022-06-08 NOTE — PROCEDURES
Removal of IUD    Date/Time: 6/8/2022 10:45 AM  Performed by: Luisito Murray MD  Authorized by: Luisito Murray MD     Consent obtained:  Verbal  Consent given by:  Patient  Procedure risks and benefits discussed: yes    Patient questions answered: yes    Patient agrees, verbalizes understanding, and wants to proceed: yes    Educational handouts given: yes    Instructions and paperwork completed: yes    IUD grasped by: forceps  Removal due to infection and inflammatory reaction: no    Removal due to mechanical complications of IUD/Nexplanon: no    Removed with no complications: yes    Insertion of IUD    Date/Time: 6/8/2022 10:45 AM  Performed by: Luisito Murray MD  Authorized by: Luisito Murray MD     Consent:     Consent obtained:  Verbal    Consent given by:  Patient    Procedure risks and benefits discussed: yes      Patient questions answered: yes      Patient agrees, verbalizes understanding, and wants to proceed: yes      Educational handouts given: yes      Instructions and paperwork completed: yes    Procedure:     Pelvic exam performed: yes      Negative urine pregnancy test: yes      Cervix cleaned and prepped: yes      Speculum placed in vagina: yes      Tenaculum applied to cervix: yes      Uterus sounded: yes      Uterus sound depth (cm):  8    IUD inserted with no complications: yes      IUD type:  Mirena    Strings trimmed: yes    1 Intra Uterine Device levonorgestreL 20 mcg/24 hours (7 yrs) 52 mg       Post-procedure:     Patient tolerated procedure well: yes      Patient will follow up after next period: yes

## 2022-07-14 ENCOUNTER — PATIENT MESSAGE (OUTPATIENT)
Dept: SURGERY | Facility: HOSPITAL | Age: 37
End: 2022-07-14
Payer: COMMERCIAL

## 2022-07-14 ENCOUNTER — TELEPHONE (OUTPATIENT)
Dept: OTOLARYNGOLOGY | Facility: CLINIC | Age: 37
End: 2022-07-14
Payer: COMMERCIAL

## 2022-07-14 ENCOUNTER — ANESTHESIA EVENT (OUTPATIENT)
Dept: SURGERY | Facility: HOSPITAL | Age: 37
End: 2022-07-14
Payer: COMMERCIAL

## 2022-07-14 NOTE — ANESTHESIA PREPROCEDURE EVALUATION
[facility]  Anesthesia Pre-Operative Evaluation         Patient Name/: Ganesh Schroeder, 1985  MRN: [id number]    SUBJECTIVE:     Pre-operative evaluation for Procedure(s) (LRB):  SEPTOPLASTY, NOSE, WITH NASAL TURBINATE REDUCTION (Bilateral)  ENDOSCOPY, NOSE (Bilateral)     2022    Ganesh Schroeder is a 36 y.o. female w/ a significant PMHx of chronic sinus congestion for many years despite conservative measures, now electing for nasal surgery with ENT. Has had a robotic hernia repair in 2019.    Patient now presents for the above procedure(s).      Prev airway: None documented.    Patient Active Problem List   Diagnosis    Umbilical hernia without obstruction and without gangrene    Ventral incisional hernia    Hives    Angio-edema       Review of patient's allergies indicates:   Allergen Reactions    Shellfish containing products Swelling     Pt reports facial swelling       Current Inpatient Medications:    levonorgestreL  1 Intra Uterine Device Intrauterine        No current facility-administered medications on file prior to encounter.     Current Outpatient Medications on File Prior to Encounter   Medication Sig Dispense Refill    cetirizine (ZYRTEC) 10 MG tablet Take 1 tablet (10 mg total) by mouth once daily. 90 tablet 3    levonorgestreL (MIRENA) 20 mcg/24 hours (7 yrs) 52 mg IUD 1 each by Intrauterine route once.         Past Surgical History:   Procedure Laterality Date     SECTION  2010, 7/29/2014    x2    PELVIC LAPAROSCOPY      endometriosis    ROBOT-ASSISTED REPAIR OF VENTRAL HERNIA USING DA ELIZABETH XI N/A 3/7/2019    Procedure: XI ROBOTIC REPAIR, HERNIA, VENTRAL WITH MESH;  Surgeon: April Burgess MD;  Location: [facility] OR [address] [place] [address];  Service: General;  Laterality: N/A;  Robotic Ventral / Incisional hernia repair    SALPINGECTOMY Right     ectopic pregnancy    UMBILICAL HERNIA REPAIR  2016       Social History:  Tobacco Use: Low Risk      Smoking Tobacco Use: Never Smoker    Smokeless Tobacco Use: Never Used       Alcohol Use: Not on file       OBJECTIVE:     Vital Signs Range:  BMI Readings from Last 1 Encounters:   06/08/22 26.47 kg/m²               Significant Labs:        Component Value Date/Time    WBC 5.01 05/03/2022 1001    HGB 12.9 05/03/2022 1001    HCT 39.2 05/03/2022 1001     05/03/2022 1001     05/03/2022 1001    K 4.0 05/03/2022 1001     05/03/2022 1001    CO2 26 05/03/2022 1001    GLU 75 05/03/2022 1001    BUN 7 05/03/2022 1001    CREATININE 0.9 05/03/2022 1001    CALCIUM 9.6 05/03/2022 1001    ALBUMIN 4.2 05/03/2022 1001    PROT 7.6 05/03/2022 1001    ALKPHOS 59 05/03/2022 1001    BILITOT 0.5 05/03/2022 1001    AST 18 05/03/2022 1001    ALT 9 (L) 05/03/2022 1001    INR 1.0 11/28/2011 1216    HGBA1C 5.3 05/03/2022 1001        Please see Results Review for additional labs.     Diagnostic Studies: No relevant studies.    EKG:   Results for orders placed or performed during the hospital encounter of 05/24/22   EKG 12-lead    Collection Time: 05/24/22  8:53 AM    Narrative    Test Reason : R06.00,R07.9,    Vent. Rate : 058 BPM     Atrial Rate : 058 BPM     P-R Int : 124 ms          QRS Dur : 088 ms      QT Int : 404 ms       P-R-T Axes : 048 008 031 degrees     QTc Int : 396 ms    Sinus bradycardia with sinus arrhythmia  Otherwise normal ECG  No previous ECGs available  Confirmed by Mamadou Burnette MD ([id number]) on 5/24/2022 11:29:42 AM    Referred By: JOSH JEFFERSON           Confirmed By:Mamadou Burnette MD       ECHO:  5/22 stress echo  Summary    · The stress echo portion of this study is negative for myocardial ischemia. Target heart rate was achieved.  · The ECG portion of this study is negative for myocardial ischemia.  · The patient's exercise capacity was above average. Achieved 13 METs.  · There were no arrhythmias during stress.  · The test was stopped because the patient experienced fatigue.  · The left  ventricle is normal in size with normal systolic function.  · The estimated ejection fraction is 60%.  · Normal left ventricular diastolic function.  · Normal right ventricular size with normal right ventricular systolic function.  · The estimated PA systolic pressure is 26 mmHg.  · Intermediate central venous pressure (8 mmHg).            ASSESSMENT/PLAN:       Pre-op Assessment     I have reviewed the Nursing Notes. I have reviewed the NPO Status.   I have reviewed the Medications.     Review of Systems  Anesthesia Hx:  No problems with previous Anesthesia  History of prior surgery of interest to airway management or planning: Denies Family Hx of Anesthesia complications.   Denies Personal Hx of Anesthesia complications.   Social:  Non-Smoker, No Alcohol Use    Hematology/Oncology:  Hematology Normal   Oncology Normal     EENT/Dental:   Chronic sinus congestion   Cardiovascular:   Denies Hypertension.  Denies MI.    Denies Angina. Walks 3 miles frequently without difficulty   Pulmonary:   Denies COPD.  Denies Asthma.  Denies Shortness of breath.    Renal/:   Denies Chronic Renal Disease.     Hepatic/GI:  Hepatic/GI Normal Denies Liver Disease.    Neurological:   Denies TIA. Denies CVA. Denies Seizures.    Endocrine:   Denies Diabetes.    Psych:  Psychiatric Normal           Physical Exam  General: Well nourished, Cooperative, Alert and Oriented    Airway:  Mallampati: II   Mouth Opening: Normal  TM Distance: Normal  Tongue: Normal  Neck ROM: Normal ROM        Anesthesia Plan  Type of Anesthesia, risks & benefits discussed:    Anesthesia Type: Gen ETT  Intra-op Monitoring Plan: Standard ASA Monitors  Post Op Pain Control Plan: multimodal analgesia and IV/PO Opioids PRN  Induction:  IV and Inhalation  Airway Plan: Video and Direct, Post-Induction  ASA Score: 2  Day of Surgery Review of History & Physical: H&P Update referred to the surgeon/provider.    Ready For Surgery From Anesthesia Perspective.     .

## 2022-07-14 NOTE — TELEPHONE ENCOUNTER
Spoke with the patient. All surgery instructions were discussed with her. She was given the opportunity to ask questions. Patient verbalized understanding.

## 2022-07-14 NOTE — PRE-PROCEDURE INSTRUCTIONS
PreOp Instructions given:   - Verbal medication information (what to hold and what to take)   - NPO guidelines [id number] CLEARS ONLY  - Arrival place directions given; time to be given the day before procedure by the   Surgeon's Office 0930 DOSC  - Bathing with antibacterial soap   - Don't wear any jewelry or bring any valuables AM of surgery   - No makeup or moisturizer to face   - No perfume/cologne, powder, lotions or aftershave   Pt. verbalized understanding.   Pt denies any h/o Anesthesia/Sedation complications or side effects.

## 2022-07-15 ENCOUNTER — HOSPITAL ENCOUNTER (OUTPATIENT)
Facility: HOSPITAL | Age: 37
Discharge: HOME | End: 2022-07-15
Attending: OTOLARYNGOLOGY | Admitting: OTOLARYNGOLOGY
Payer: COMMERCIAL

## 2022-07-15 ENCOUNTER — ANESTHESIA (OUTPATIENT)
Dept: SURGERY | Facility: HOSPITAL | Age: 37
End: 2022-07-15
Payer: COMMERCIAL

## 2022-07-15 VITALS
WEIGHT: 155 LBS | TEMPERATURE: 98 F | DIASTOLIC BLOOD PRESSURE: 67 MMHG | HEART RATE: 71 BPM | BODY MASS INDEX: 26.46 KG/M2 | SYSTOLIC BLOOD PRESSURE: 110 MMHG | HEIGHT: 64 IN | OXYGEN SATURATION: 100 % | RESPIRATION RATE: 16 BRPM

## 2022-07-15 DIAGNOSIS — J34.2 DNS (DEVIATED NASAL SEPTUM): ICD-10-CM

## 2022-07-15 DIAGNOSIS — J34.2 NASAL SEPTAL DEVIATION: Primary | ICD-10-CM

## 2022-07-15 LAB
B-HCG UR QL: NEGATIVE
CTP QC/QA: YES

## 2022-07-15 PROCEDURE — D9220A PRA ANESTHESIA: Mod: ,,, | Performed by: ANESTHESIOLOGY

## 2022-07-15 PROCEDURE — 31240 NSL/SNS NDSC CNCH BULL RESCJ: CPT | Mod: 50,51,, | Performed by: OTOLARYNGOLOGY

## 2022-07-15 PROCEDURE — 63600175 PHARM REV CODE 636 W HCPCS

## 2022-07-15 PROCEDURE — 63600175 PHARM REV CODE 636 W HCPCS: Performed by: OTOLARYNGOLOGY

## 2022-07-15 PROCEDURE — 36000707: Performed by: OTOLARYNGOLOGY

## 2022-07-15 PROCEDURE — 27201423 OPTIME MED/SURG SUP & DEVICES STERILE SUPPLY: Performed by: OTOLARYNGOLOGY

## 2022-07-15 PROCEDURE — 30520 PR REPAIR, NASAL SEPTUM: ICD-10-PCS | Mod: ,,, | Performed by: OTOLARYNGOLOGY

## 2022-07-15 PROCEDURE — 25000003 PHARM REV CODE 250

## 2022-07-15 PROCEDURE — 37000009 HC ANESTHESIA EA ADD 15 MINS: Performed by: OTOLARYNGOLOGY

## 2022-07-15 PROCEDURE — 71000044 HC DOSC ROUTINE RECOVERY FIRST HOUR: Performed by: OTOLARYNGOLOGY

## 2022-07-15 PROCEDURE — 71000016 HC POSTOP RECOV ADDL HR: Performed by: OTOLARYNGOLOGY

## 2022-07-15 PROCEDURE — 71000015 HC POSTOP RECOV 1ST HR: Performed by: OTOLARYNGOLOGY

## 2022-07-15 PROCEDURE — 25000003 PHARM REV CODE 250: Performed by: ANESTHESIOLOGY

## 2022-07-15 PROCEDURE — 81025 URINE PREGNANCY TEST: CPT | Performed by: OTOLARYNGOLOGY

## 2022-07-15 PROCEDURE — 71000045 HC DOSC ROUTINE RECOVERY EA ADD'L HR: Performed by: OTOLARYNGOLOGY

## 2022-07-15 PROCEDURE — 36000706: Performed by: OTOLARYNGOLOGY

## 2022-07-15 PROCEDURE — 30140 PR EXCISION TURBINATE,SUBMUCOUS: ICD-10-PCS | Mod: 50,51,, | Performed by: OTOLARYNGOLOGY

## 2022-07-15 PROCEDURE — 63600175 PHARM REV CODE 636 W HCPCS: Performed by: STUDENT IN AN ORGANIZED HEALTH CARE EDUCATION/TRAINING PROGRAM

## 2022-07-15 PROCEDURE — 30140 RESECT INFERIOR TURBINATE: CPT | Mod: 50,51,, | Performed by: OTOLARYNGOLOGY

## 2022-07-15 PROCEDURE — 31240 PR NASAL/SINUS ENDOSCOPY,RMV CONCHA BULL: ICD-10-PCS | Mod: 50,51,, | Performed by: OTOLARYNGOLOGY

## 2022-07-15 PROCEDURE — 37000008 HC ANESTHESIA 1ST 15 MINUTES: Performed by: OTOLARYNGOLOGY

## 2022-07-15 PROCEDURE — 30520 REPAIR OF NASAL SEPTUM: CPT | Mod: ,,, | Performed by: OTOLARYNGOLOGY

## 2022-07-15 PROCEDURE — D9220A PRA ANESTHESIA: ICD-10-PCS | Mod: ,,, | Performed by: ANESTHESIOLOGY

## 2022-07-15 PROCEDURE — 63600175 PHARM REV CODE 636 W HCPCS: Performed by: ANESTHESIOLOGY

## 2022-07-15 PROCEDURE — 25000003 PHARM REV CODE 250: Performed by: OTOLARYNGOLOGY

## 2022-07-15 RX ORDER — PHENYLEPHRINE HCL IN 0.9% NACL 1 MG/10 ML
SYRINGE (ML) INTRAVENOUS
Status: DISCONTINUED | OUTPATIENT
Start: 2022-07-15 | End: 2022-07-15

## 2022-07-15 RX ORDER — LIDOCAINE HYDROCHLORIDE AND EPINEPHRINE 10; 10 MG/ML; UG/ML
INJECTION, SOLUTION INFILTRATION; PERINEURAL
Status: DISCONTINUED | OUTPATIENT
Start: 2022-07-15 | End: 2022-07-15 | Stop reason: HOSPADM

## 2022-07-15 RX ORDER — DEXMEDETOMIDINE HYDROCHLORIDE 100 UG/ML
INJECTION, SOLUTION INTRAVENOUS
Status: DISCONTINUED | OUTPATIENT
Start: 2022-07-15 | End: 2022-07-15

## 2022-07-15 RX ORDER — LIDOCAINE HYDROCHLORIDE 20 MG/ML
INJECTION, SOLUTION EPIDURAL; INFILTRATION; INTRACAUDAL; PERINEURAL
Status: DISCONTINUED | OUTPATIENT
Start: 2022-07-15 | End: 2022-07-15

## 2022-07-15 RX ORDER — ACETAMINOPHEN 500 MG
1000 TABLET ORAL
Status: COMPLETED | OUTPATIENT
Start: 2022-07-15 | End: 2022-07-15

## 2022-07-15 RX ORDER — HALOPERIDOL 5 MG/ML
0.5 INJECTION INTRAMUSCULAR EVERY 10 MIN PRN
Status: DISCONTINUED | OUTPATIENT
Start: 2022-07-15 | End: 2022-07-15 | Stop reason: HOSPADM

## 2022-07-15 RX ORDER — PROPOFOL 10 MG/ML
VIAL (ML) INTRAVENOUS
Status: DISCONTINUED | OUTPATIENT
Start: 2022-07-15 | End: 2022-07-15

## 2022-07-15 RX ORDER — DEXTROMETHORPHAN HYDROBROMIDE, GUAIFENESIN 5; 100 MG/5ML; MG/5ML
650 LIQUID ORAL EVERY 8 HOURS
Qty: 30 TABLET | Refills: 1 | Status: SHIPPED | OUTPATIENT
Start: 2022-07-15 | End: 2023-07-07

## 2022-07-15 RX ORDER — SODIUM CHLORIDE 0.9 % (FLUSH) 0.9 %
10 SYRINGE (ML) INJECTION
Status: DISCONTINUED | OUTPATIENT
Start: 2022-07-15 | End: 2022-07-15 | Stop reason: HOSPADM

## 2022-07-15 RX ORDER — ONDANSETRON 4 MG/1
8 TABLET, ORALLY DISINTEGRATING ORAL EVERY 8 HOURS PRN
Qty: 15 TABLET | Refills: 0 | Status: SHIPPED | OUTPATIENT
Start: 2022-07-15 | End: 2023-07-07

## 2022-07-15 RX ORDER — ONDANSETRON 2 MG/ML
INJECTION INTRAMUSCULAR; INTRAVENOUS
Status: DISCONTINUED | OUTPATIENT
Start: 2022-07-15 | End: 2022-07-15

## 2022-07-15 RX ORDER — HYDROMORPHONE HYDROCHLORIDE 1 MG/ML
0.2 INJECTION, SOLUTION INTRAMUSCULAR; INTRAVENOUS; SUBCUTANEOUS EVERY 5 MIN PRN
Status: DISCONTINUED | OUTPATIENT
Start: 2022-07-15 | End: 2022-07-15 | Stop reason: HOSPADM

## 2022-07-15 RX ORDER — IBUPROFEN 600 MG/1
600 TABLET ORAL EVERY 8 HOURS PRN
Qty: 30 TABLET | Refills: 0 | Status: SHIPPED | OUTPATIENT
Start: 2022-07-15 | End: 2023-07-07

## 2022-07-15 RX ORDER — HYDROMORPHONE HYDROCHLORIDE 2 MG/ML
INJECTION, SOLUTION INTRAMUSCULAR; INTRAVENOUS; SUBCUTANEOUS
Status: DISCONTINUED | OUTPATIENT
Start: 2022-07-15 | End: 2022-07-15

## 2022-07-15 RX ORDER — DEXAMETHASONE SODIUM PHOSPHATE 4 MG/ML
INJECTION, SOLUTION INTRA-ARTICULAR; INTRALESIONAL; INTRAMUSCULAR; INTRAVENOUS; SOFT TISSUE
Status: DISCONTINUED | OUTPATIENT
Start: 2022-07-15 | End: 2022-07-15

## 2022-07-15 RX ORDER — NEOSTIGMINE METHYLSULFATE 0.5 MG/ML
INJECTION, SOLUTION INTRAVENOUS
Status: DISCONTINUED | OUTPATIENT
Start: 2022-07-15 | End: 2022-07-15

## 2022-07-15 RX ORDER — FENTANYL CITRATE 50 UG/ML
INJECTION, SOLUTION INTRAMUSCULAR; INTRAVENOUS
Status: DISCONTINUED | OUTPATIENT
Start: 2022-07-15 | End: 2022-07-15

## 2022-07-15 RX ORDER — MIDAZOLAM HYDROCHLORIDE 1 MG/ML
INJECTION, SOLUTION INTRAMUSCULAR; INTRAVENOUS
Status: DISCONTINUED | OUTPATIENT
Start: 2022-07-15 | End: 2022-07-15

## 2022-07-15 RX ORDER — METOPROLOL TARTRATE 1 MG/ML
INJECTION, SOLUTION INTRAVENOUS
Status: DISCONTINUED | OUTPATIENT
Start: 2022-07-15 | End: 2022-07-15

## 2022-07-15 RX ORDER — CEFAZOLIN SODIUM/WATER 2 G/20 ML
2 SYRINGE (ML) INTRAVENOUS
Status: COMPLETED | OUTPATIENT
Start: 2022-07-15 | End: 2022-07-15

## 2022-07-15 RX ORDER — LIDOCAINE HYDROCHLORIDE 10 MG/ML
1 INJECTION, SOLUTION EPIDURAL; INFILTRATION; INTRACAUDAL; PERINEURAL ONCE
Status: DISCONTINUED | OUTPATIENT
Start: 2022-07-15 | End: 2022-07-15 | Stop reason: HOSPADM

## 2022-07-15 RX ORDER — EPINEPHRINE 1 MG/ML
INJECTION INTRAMUSCULAR; INTRAVENOUS; SUBCUTANEOUS
Status: DISCONTINUED | OUTPATIENT
Start: 2022-07-15 | End: 2022-07-15 | Stop reason: HOSPADM

## 2022-07-15 RX ORDER — PROPOFOL 10 MG/ML
VIAL (ML) INTRAVENOUS CONTINUOUS PRN
Status: DISCONTINUED | OUTPATIENT
Start: 2022-07-15 | End: 2022-07-15

## 2022-07-15 RX ORDER — ROCURONIUM BROMIDE 10 MG/ML
INJECTION, SOLUTION INTRAVENOUS
Status: DISCONTINUED | OUTPATIENT
Start: 2022-07-15 | End: 2022-07-15

## 2022-07-15 RX ORDER — KETAMINE HCL IN 0.9 % NACL 50 MG/5 ML
SYRINGE (ML) INTRAVENOUS
Status: DISCONTINUED | OUTPATIENT
Start: 2022-07-15 | End: 2022-07-15

## 2022-07-15 RX ADMIN — SODIUM CHLORIDE, SODIUM GLUCONATE, SODIUM ACETATE, POTASSIUM CHLORIDE, MAGNESIUM CHLORIDE, SODIUM PHOSPHATE, DIBASIC, AND POTASSIUM PHOSPHATE: .53; .5; .37; .037; .03; .012; .00082 INJECTION, SOLUTION INTRAVENOUS at 02:07

## 2022-07-15 RX ADMIN — METOROPROLOL TARTRATE 2.5 MG: 5 INJECTION, SOLUTION INTRAVENOUS at 02:07

## 2022-07-15 RX ADMIN — Medication 30 MG: at 03:07

## 2022-07-15 RX ADMIN — Medication 50 MCG/KG/MIN: at 02:07

## 2022-07-15 RX ADMIN — MIDAZOLAM 2 MG: 1 INJECTION INTRAMUSCULAR; INTRAVENOUS at 02:07

## 2022-07-15 RX ADMIN — NEOSTIGMINE METHYLSULFATE 5 MG: 0.5 INJECTION, SOLUTION INTRAVENOUS at 03:07

## 2022-07-15 RX ADMIN — HYDROMORPHONE HYDROCHLORIDE 0.4 MG: 2 INJECTION, SOLUTION INTRAMUSCULAR; INTRAVENOUS; SUBCUTANEOUS at 03:07

## 2022-07-15 RX ADMIN — SUGAMMADEX 200 MG: 100 INJECTION, SOLUTION INTRAVENOUS at 03:07

## 2022-07-15 RX ADMIN — DEXAMETHASONE SODIUM PHOSPHATE 8 MG: 4 INJECTION INTRA-ARTICULAR; INTRALESIONAL; INTRAMUSCULAR; INTRAVENOUS; SOFT TISSUE at 02:07

## 2022-07-15 RX ADMIN — HYDROMORPHONE HYDROCHLORIDE 0.2 MG: 1 INJECTION, SOLUTION INTRAMUSCULAR; INTRAVENOUS; SUBCUTANEOUS at 05:07

## 2022-07-15 RX ADMIN — DEXMEDETOMIDINE HYDROCHLORIDE 8 MCG: 100 INJECTION, SOLUTION INTRAVENOUS at 03:07

## 2022-07-15 RX ADMIN — LIDOCAINE HYDROCHLORIDE 100 MG: 20 INJECTION, SOLUTION EPIDURAL; INFILTRATION; INTRACAUDAL at 02:07

## 2022-07-15 RX ADMIN — Medication 20 MG: at 03:07

## 2022-07-15 RX ADMIN — ACETAMINOPHEN 1000 MG: 500 TABLET ORAL at 10:07

## 2022-07-15 RX ADMIN — FENTANYL CITRATE 50 MCG: 50 INJECTION INTRAMUSCULAR; INTRAVENOUS at 02:07

## 2022-07-15 RX ADMIN — PROPOFOL 200 MG: 10 INJECTION, EMULSION INTRAVENOUS at 02:07

## 2022-07-15 RX ADMIN — GLYCOPYRROLATE 0.5 MG: 0.2 INJECTION INTRAMUSCULAR; INTRAVENOUS at 03:07

## 2022-07-15 RX ADMIN — Medication 100 MCG: at 03:07

## 2022-07-15 RX ADMIN — ROCURONIUM BROMIDE 40 MG: 10 INJECTION INTRAVENOUS at 02:07

## 2022-07-15 RX ADMIN — Medication 2 G: at 02:07

## 2022-07-15 RX ADMIN — ROCURONIUM BROMIDE 10 MG: 10 INJECTION INTRAVENOUS at 02:07

## 2022-07-15 RX ADMIN — ONDANSETRON 4 MG: 2 INJECTION INTRAMUSCULAR; INTRAVENOUS at 03:07

## 2022-07-15 RX ADMIN — SODIUM CHLORIDE: 0.9 INJECTION, SOLUTION INTRAVENOUS at 02:07

## 2022-07-15 NOTE — ANESTHESIA POSTPROCEDURE EVALUATION
Anesthesia Post Evaluation    Patient: Ganesh Schroeder    Procedure(s) Performed: Procedure(s) (LRB):  SEPTOPLASTY, NOSE, WITH NASAL TURBINATE REDUCTION (Bilateral)  ENDOSCOPY, NOSE (Bilateral)    Final Anesthesia Type: general      Patient location during evaluation: PACU  Patient participation: Yes- Able to Participate  Level of consciousness: awake and alert  Post-procedure vital signs: reviewed and stable  Pain management: adequate  Airway patency: patent    PONV status at discharge: No PONV  Anesthetic complications: no      Cardiovascular status: stable  Respiratory status: spontaneous ventilation  Hydration status: euvolemic  Follow-up not needed.          Vitals Value Taken Time   /76 07/15/22 1631   Temp 36.7 °C (98.1 °F) 07/15/22 1610   Pulse 54 07/15/22 1644   Resp 9 07/15/22 1644   SpO2 100 % 07/15/22 1644   Vitals shown include unvalidated device data.      No case tracking events are documented in the log.      Pain/Chandni Score: Pain Rating Prior to Med Admin: 0 (7/15/2022 10:37 AM)  Chandni Score: 8 (7/15/2022  4:30 PM)

## 2022-07-15 NOTE — PATIENT INSTRUCTIONS
INSTRUCTIONS TO FOLLOW AFTER SINUS AND NASAL SURGERY  DR. McCOUL - [facility]    Office hours:  Weekdays 8:00 am to 5:00 pm.  Please call [phone] and ask to speak with his nurse or medical assistant.    After-hours & weekends:  Please call [phone] and ask to speak with the ENT resident doctor.    Your first office visit with Dr. Coley after surgery should have been already scheduled.  If you dont know when it is, call Dr. Amador nurse or medical assistant at [phone].    Please call immediately if you have:    Temperature of 101° F or greater  Any unusual, painful swelling  Any active bleeding that saturates more than a 4x4 gauze  Any thick drainage green or yellow drainage  Changes in vision or swelling around the eye  Pain not relieved by your prescribed pain medication    ACTIVITY:    Sleep on your back with the head of the bead elevated, up on 2-3 pillows, or in a recliner for the first 3 to 5 days. This will help with swelling.     After surgery you may have a lot of nasal drainage. This is normal. You may breathe through your nose if youre able but avoid inhaling forcibly. Let all drainage fall on your mustache dressing and change it as needed.    You may wake up after surgery with thick white stockings on. Wear them until you are walking around more. It is important to walk around often while at home to keep your blood circulating and prevent blood clots.    If you use CPAP or BiPAP to sleep at night, you should wait at least 48 hours before resuming use. Dr. Coley will advise you when it is safe to do this.    You may shower 24 hours after surgery.    RESTRICTED ACTIVITIES AFTER SURGERY:    DO NOT blow your nose for 2 weeks. The only exception is that you may lightly blow your nose after using the sinus rinse. If you have to sneeze or cough, do so with your mouth open.     AVOID all heavy lifting, straining or bending for 2 weeks.     AVOID any sexual activity for 2 weeks after  surgery.    AVOID semi-contact sports or vigorous exercising for 3-4 weeks. Dr. Coley will let you know when you are cleared to resume exercise.    AVOID flying or swimming for 2 weeks.      DO NOT operate a motor vehicle or any type of heavy machinery within 24 hours of taking prescription pain medication.    DO NOT smoke or be around smokers.    AVOID irritating substances that might make you sneeze, such as dust, chalk, harsh chemicals, and allergic triggers. This might also include spicy foods.    DRESSINGS:    Change the gauze mustache dressing under your nose as needed. (If unsure what this dressing is or how to do this, ask your doctor or nurse before you leave the hospital.) You may have pinkish-red drainage for 2-3 days.    Usually there is no gauze packing placed inside the nose.  If packing is necessary, you will be informed by your surgeon.  Do not touch or pull at the packing. The packing will be removed by your doctor at your first visit after surgery.     You may also have a dissolvable stent or dissolvable sponge placed into the sinuses during surgery.  These usually do not need to be removed unless you are told otherwise by Dr. Coley.  You may notice small fragments of these items come out of your nose in the weeks following surgery.    MEDICATIONS:    After surgery, you will be sent home with prescriptions for pain medication and an anti-nausea medication. Antibiotics are usually not necessary.    Most people need pain medication for the first few days after surgery, although a narcotic is rarely necessary. The best pain control comes from a combination of ACETAMINOPHEN (Tylenol) and IBUPROFEN (Motrin). You will be given prescriptions for these at the recommended dose. These can be alternated so that you are taking something every 2 or 3 hours.    Some people have problems with bowel movements after surgery. If you have NOT had a bowel movement 3-5 days after surgery, go to your local pharmacy  and purchase an over the counter stool softener such as COLACE. You can also ask the pharmacist for his or her recommendation. If you still do not have a bowel movement after starting the softener, please call the office.    You may be given an ointment called MUPIROCIN to use after surgery. If you are given this, use a cotton swab to apply gently inside the nostrils. Do this 2 times a day for 1 week.    You will need these over-the-counter medications after surgery:    SALINE SINUS RINSE (Omega Med brand): You will use this to rinse out your nose and sinuses after surgery. Begin doing this the day after surgery, unless instructed otherwise by Dr. Coley.  You should do this 2 times a day, following the instructions on the box.    AFRIN (regular strength): Only use if you have nasal congestion or bleeding. Use 2 times per day for 3 days, stop for 1 day, continue 2 times per day for 3 days, then stop completely.  NOTE:  You may not need to do this at all.    DIET:    Avoid hot and spicy foods for 1 week after surgery.    Begin with bland foods the evening after surgery and advance to your regular diet as tolerated. It is not necessary to take only soft food unless you are recovering from tonsil surgery.    Drink plenty of fluids (water is best).     Avoid alcoholic and caffeinated beverages for 1 week after surgery because they can cause you to become dehydrated.

## 2022-07-15 NOTE — H&P
Progress Notes  Jenaro Coley MD (Physician)   Otolaryngology     Subjective:      Ganesh Schroeder is a 36 y.o. female who was referred to me by Kathrine Ovalle in consultation for nasal congestion.     She relates a long history for many years of bilateral, perennial, moderately severe nasal congestion, which is worse at night when lying down.  She notes associated nonpurulent nasal discharge, midfacial pressure, and pruritic symptoms.  These have not improved with several months of fluticasone spray, zyrtec and saline.  She denies hyposmia, snoring, aural fullness or notable sinus infections.  She was reportedly planning for nasal surgery with Dr. Quintero in spring 2020 when the pandemic intervened.     Current sinonasal medications include Flonase, Zyrtec, saline and Singulair.  The last course of antibiotics was a long time ago.  She does not regularly use nasal decongestant sprays.     She recalls previously having allergy testing, which was reportedly all negative except for shellfish.     She denies a history of asthma.     She denies a history of reflux symptoms.       She denies a diagnosis of obstructive sleep apnea.      She has not had sinonasal surgery.  She has not had a tonsillectomy.     She does not recall a prior history of nasal trauma.     SNOT-22 score: : (P) 45  NOSE score:: (P) 60%  ETDQ-7 score:: (P) 2.9        Past Medical History  She has a past medical history of Abnormal Pap smear of cervix, Angio-edema, and Urticaria.     Past Surgical History  She has a past surgical history that includes Pelvic laparoscopy; Salpingectomy (Right, );  section (2010, 2014); Umbilical hernia repair (2016); and Robot-assisted repair of ventral hernia using da Lora Xi (N/A, 3/7/2019).     Family History  Her family history includes No Known Problems in her child, child, father, maternal grandfather, maternal grandmother, mother, paternal grandfather, paternal  "grandmother, and sister.     Social History  She reports that she has never smoked. She has never used smokeless tobacco. She reports that she does not drink alcohol and does not use drugs.     Allergies  She has No Known Allergies.     Medications   She has a current medication list which includes the following prescription(s): cetirizine, ergocalciferol, fluticasone propionate, levonorgestrel, montelukast, and epinephrine.     Review of Systems      Constitutional: Negative for appetite change, chills, fatigue, fever and unexpected weight loss.       HENT: Positive for sinus infection and sinus pressure.  Negative for ear discharge, ear infection, ear pain, facial swelling, hearing loss, mouth sores, nosebleeds, postnasal drip, ringing in the ears, runny nose, sore throat, stuffy nose, tonsil infection, dental problems, trouble swallowing and voice change.       Eyes:  Negative for change in eyesight, eye drainage, eye itching and photophobia.      Respiratory:  Negative for cough, shortness of breath, sleep apnea, snoring and wheezing.       Cardiovascular:  Negative for chest pain, foot swelling, irregular heartbeat and swollen veins.      Gastrointestinal:  Negative for abdominal pain, acid reflux, constipation, diarrhea, heartburn and vomiting.      Genitourinary: Negative for difficulty urinating, sexual problems and frequent urination.      Musc: Negative for aching joints, aching muscles, back pain and neck pain.      Skin: Negative for rash.      Allergy: Positive for food allergies and seasonal allergies.      Endocrine: Negative for cold intolerance and heat intolerance.       Neurological: Positive for headaches. Negative for dizziness, light-headedness, seizures and tremors.       Hematologic: Negative for bruises/bleeds easily and swollen glands.       Psychiatric: Negative for decreased concentration, depression, nervous/anxious and sleep disturbance.                   Objective:      Ht 5' 4" (1.626 " m)   Wt 69.9 kg (154 lb 1.6 oz)   BMI 26.45 kg/m²          Constitutional:   She appears well-developed. She is cooperative. Normal speech.  No hoarse voice.       Head:  Normocephalic. Salivary glands normal.  Facial strength is normal.       Ears:    Right Ear: No drainage or tenderness. Tympanic membrane is not perforated. Tympanic membrane mobility is normal. No middle ear effusion. No decreased hearing is noted.   Left Ear: No drainage or tenderness. Tympanic membrane is not perforated. Tympanic membrane mobility is normal.  No middle ear effusion. No decreased hearing is noted.      Nose:  Septal deviation present. No mucosal edema, rhinorrhea or polyps. No epistaxis. Turbinate hypertrophy.  Turbinates normal and no turbinate masses.  Right sinus exhibits no maxillary sinus tenderness and no frontal sinus tenderness. Left sinus exhibits no maxillary sinus tenderness and no frontal sinus tenderness.   Negative modified Leisa maneuver  Large bony turbinate hypertrophy after decongestion     Mouth/Throat  Oropharynx clear and moist without lesions or asymmetry and normal uvula midline. She does not have dentures. Normal dentition. No oral lesions or mucous membrane lesions. No oropharyngeal exudate or posterior oropharyngeal erythema. Tonsils present, +1.  Mirror exam not performed due to patient tolerance.  Mirror exam not performed due to patient tolerance.    +2 MMP       Neck:  Neck normal without thyromegaly masses, asymmetry, normal tracheal structure, crepitus, and tenderness, thyroid normal, trachea normal and no adenopathy. Normal range of motion present.     She has no cervical adenopathy.      Cardiovascular:   Regular rhythm.       Pulmonary/Chest:   Effort normal.      Psychiatric:   She has a normal mood and affect. Her speech is normal and behavior is normal.      Neurological:   No cranial nerve deficit.      Skin:   No rash noted.         Procedure     Nasal endoscopy performed.  See procedure  note.           Data Reviewed         WBC (K/uL)   Date Value   12/02/2020 4.93          Eosinophil % (%)   Date Value   12/02/2020 4.9      Eos # (K/uL)   Date Value   12/02/2020 0.2          Platelets (K/uL)   Date Value   12/02/2020 268          Glucose (mg/dL)   Date Value   12/02/2020 85          IgE (IU/mL)   Date Value   12/07/2020 58         I independently reviewed the images of the CT sinuses dated 1/20/20. Pertinent findings include clear sinsues except for an incidental left maxillary cyst, large occlusive inferior turbinates, and bilateral isaias bullosae.        Assessment:      1. Hypertrophy of inferior nasal turbinate    2. Nasal septal deviation    3. Isaias bullosa          Plan:      I had a long discussion with the patient regarding her condition and the further workup and management options.  She would benefit from septoplasty and turbinate reduction and endoscopic isaias bullosae resection for the treatment of her condition.  I discussed the risks, benefits and alternatives to surgery with the patient, as well as the expected postoperative course.  I gave her the opportunity to ask questions and I answered all of them.  I provided relevant printed information on her condition for her to review at home.  Same-day discharge is anticipated.  She may have anesthesia triage by telephone.   The surgery will be tentatively scheduled for sometime around [place] break.  She will need to return for a postoperative visit 1 week after surgery.      Follow up for surgery.           To OR for above

## 2022-07-15 NOTE — OP NOTE
DATE OF OPERATION: 7/15/2022    SURGEON:  Jenaro Coley MD     ASSISTANT SURGEON:  Leon Foster MD     OPERATION:     1. Endoscopic septoplasty.  2. Bilateral inferior turbinate reduction with submucosal resection.  3. Endoscopic resection of bilateral isaias bullosae.     PREOPERATIVE DIAGNOSIS:      1. Deviated nasal septum.  2. Hypertrophic turbinates.  3. Isaias bullosae.     POSTOPERATIVE DIAGNOSIS:      1. Deviated nasal septum.  2. Hypertrophic turbinates.  3. Isaias bullosae.     ANESTHESIA: General.     COMPLICATIONS: None.     ESTIMATED BLOOD LOSS: 20 mL     SPECIMEN: None.     WOUND EXPECTANCY: Clean-contaminated.     FINDINGS: Leftward septal deviation.  Compound inferior turbinate hypertrophy.     INDICATIONS: Anatomic nasal obstruction, not improved with medical therapy.     I discussed the risks, benefits and alternatives of surgical correction of the septal deviation and turbinate hypertrophy with the patient as well as the expected postoperative course. I gave her the opportunity to ask questions and I answered all of them. On the morning of surgery I again met with the patient and reviewed the indications for surgery and she consented to proceed.     DESCRIPTION OF PROCEDURE: The patient was brought to the operating room and placed supine on the operating table. The patient was placed under general anesthesia and intubated. The patient was positioned with a donut under the head.  Cottonoid pledgets soaked with 4% cocaine were placed into the nasal cavity bilaterally for mucosal decongestion. The mucosa of the nasal septum was injected with 1% lidocaine with 1:100,000 parts epinephrine.  A time-out was performed to confirm the proper patient, site and procedure. The patient was prepped and draped in the usual fashion.     Surgery began with performance of submucous resection of the nasal septum. This began with additional injections, assisted by a 0-degree endoscope. Then, a hemitransfixion  incision was made using a #15 blade on the left side. The submucoperichondrial plane was identified and this was elevated using a Ironside elevator. This plane was carried posteriorly to the bony-cartilaginous junction.  A Leisa elevator was used to incise the cartilage at the junction and a contralateral mucoperiosteal flap was elevated. Then, using a 0-degree endoscope, the septal pocket was visualized and there was an additional long process of cartilage along the floor causing a deviation. This was resected using a [place] elevator and was removed.       Additional elevation of the flaps over the vomer revealed a large spur that was jutting into the middle meatus. This portion of the bone was resected top and bottom using a Jacqueline scissors and the spur was removed using a Carmina forceps. After removal, there was no perforation of the mucoperichondrial flap. At this point, 10,000 units of topical thrombin mixed with epinephrine concentrated 1:1000 parts was applied to pledgets and placed between the flaps for topical hemostasis. After these pledgets were removed, there was excellent hemostasis at the septal site.       Then, under endoscopic visualization, a transseptal quilting suture of 4-0 plain gut was used to reapproximate the nasal septal flaps. Then the hemitransfixion incision was closed using 4-0 chromic gut suture in figure-of-eight fashion times two.  Repeated nasal endoscopy at this point revealed marked improvement of the septal deviation and good visualization of the vertical suspension of both middle turbinates.    An obstructive isaias bullosa was then visible.  A vertical incision was made at the anterior head of the turbinate using a sickle knife.  The incision was carried posteriorly and inferiorly using a Jose Alfredo scissors.  The lateral half of the conchal cell was then removed, taking care not to destabilize the superior attachment of the middle turbinate.  A similar procedure was then performed  on the opposite side.    Attention was then turned to the turbinates.  Additional injections were performed around the inferior turbinates and the sphenopalatine ganglion region bilaterally.  Incisions were made in the anterior head of the inferior turbinate using a #6400 blade.  A Fannin elevator was then tunnelled medially to the turbinate bone and used to segmentally outfracture and morselize the bone.  Then, a 2 mm blade on the powered tissue shaver was tunneled submucosally and used to resect soft tissue of the turbinate while overlying mucosa was preserved.   Finally, the turbinates were outfractured using a Cline/Boies elevator.  An identical procedure was performed bilaterally.    At the conclusion of the procedure there was good hemostasis.  Mupirocin ointment was applied to the vestibule bilaterally.  A nasal dressing was applied and the drapes were taken down.  The patient was then turned back toward the anesthesiologist and awakened from anesthesia, extubated and transferred to the recovery room in stable condition.

## 2022-07-15 NOTE — BRIEF OP NOTE
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    DISPOSITION: Home or Self Care    FINAL DIAGNOSIS:  Nasal septal deviation    FOLLOWUP: In clinic    DISCHARGE INSTRUCTIONS:    Discharge Procedure Orders   Lifting restrictions   Order Comments: 10 lbs     Notify your health care provider if you experience any of the following:  temperature >100.4     Notify your health care provider if you experience any of the following:  severe uncontrolled pain     Notify your health care provider if you experience any of the following:  redness, tenderness, or signs of infection (pain, swelling, redness, odor or green/yellow discharge around incision site)     Notify your health care provider if you experience any of the following:  difficulty breathing or increased cough     No dressing needed     Activity as tolerated

## 2022-07-15 NOTE — ANESTHESIA PROCEDURE NOTES
Intubation    Date/Time: 7/15/2022 2:23 PM  Performed by: Ferdinand Snow DO  Authorized by: Kota Huitron Jr., MD     Intubation:     Induction:  Intravenous    Intubated:  Postinduction    Mask Ventilation:  Easy with oral airway    Attempts:  1    Attempted By:  Resident anesthesiologist    Method of Intubation:  Direct    Blade:  Antonio 2    Laryngeal View Grade: Grade I - full view of cords      Difficult Airway Encountered?: No      Airway Device:  Oral aroldo    Airway Device Size:  7.0    Inflation Amount (mL):  8    Tube secured:  20    Secured at:  The lips    Placement Verified By:  Capnometry and Revisualization with laryngoscopy    Complicating Factors:  None    Findings Post-Intubation:  BS equal bilateral and atraumatic/condition of teeth unchanged

## 2022-07-15 NOTE — TRANSFER OF CARE
"Anesthesia Transfer of Care Note    Patient: Ganesh Schroeder    Procedure(s) Performed: Procedure(s) (LRB):  SEPTOPLASTY, NOSE, WITH NASAL TURBINATE REDUCTION (Bilateral)  ENDOSCOPY, NOSE (Bilateral)    Patient location: PACU    Anesthesia Type: general    Transport from OR: Transported from OR on 6-10 L/min O2 by face mask with adequate spontaneous ventilation    Post pain: adequate analgesia    Post assessment: no apparent anesthetic complications    Post vital signs: stable    Level of consciousness: sedated    Nausea/Vomiting: no nausea/vomiting    Complications: none    Transfer of care protocol was followed      Last vitals:   Visit Vitals  /65   Pulse 62   Temp 37.2 °C (99 °F) (Oral)   Resp 18   Ht 5' 4" (1.626 m)   Wt 70.3 kg (155 lb)   SpO2 100%   Breastfeeding No   BMI 26.61 kg/m²     "

## 2022-07-21 ENCOUNTER — OFFICE VISIT (OUTPATIENT)
Dept: OTOLARYNGOLOGY | Facility: CLINIC | Age: 37
End: 2022-07-21
Payer: COMMERCIAL

## 2022-07-21 VITALS — HEIGHT: 64 IN | BODY MASS INDEX: 25.61 KG/M2 | WEIGHT: 150 LBS

## 2022-07-21 DIAGNOSIS — J34.3 HYPERTROPHY OF INFERIOR NASAL TURBINATE: ICD-10-CM

## 2022-07-21 DIAGNOSIS — J34.2 NASAL SEPTAL DEVIATION: Primary | ICD-10-CM

## 2022-07-21 DIAGNOSIS — J34.89 CONCHA BULLOSA: ICD-10-CM

## 2022-07-21 PROCEDURE — 1160F PR REVIEW ALL MEDS BY PRESCRIBER/CLIN PHARMACIST DOCUMENTED: ICD-10-PCS | Mod: CPTII,S$GLB,, | Performed by: OTOLARYNGOLOGY

## 2022-07-21 PROCEDURE — 1159F PR MEDICATION LIST DOCUMENTED IN MEDICAL RECORD: ICD-10-PCS | Mod: CPTII,S$GLB,, | Performed by: OTOLARYNGOLOGY

## 2022-07-21 PROCEDURE — 99024 POSTOP FOLLOW-UP VISIT: CPT | Mod: S$GLB,,, | Performed by: OTOLARYNGOLOGY

## 2022-07-21 PROCEDURE — 1160F RVW MEDS BY RX/DR IN RCRD: CPT | Mod: CPTII,S$GLB,, | Performed by: OTOLARYNGOLOGY

## 2022-07-21 PROCEDURE — 1159F MED LIST DOCD IN RCRD: CPT | Mod: CPTII,S$GLB,, | Performed by: OTOLARYNGOLOGY

## 2022-07-21 PROCEDURE — 99999 PR PBB SHADOW E&M-EST. PATIENT-LVL III: ICD-10-PCS | Mod: PBBFAC,,, | Performed by: OTOLARYNGOLOGY

## 2022-07-21 PROCEDURE — 99024 PR POST-OP FOLLOW-UP VISIT: ICD-10-PCS | Mod: S$GLB,,, | Performed by: OTOLARYNGOLOGY

## 2022-07-21 PROCEDURE — 99999 PR PBB SHADOW E&M-EST. PATIENT-LVL III: CPT | Mod: PBBFAC,,, | Performed by: OTOLARYNGOLOGY

## 2022-07-21 PROCEDURE — 3008F PR BODY MASS INDEX (BMI) DOCUMENTED: ICD-10-PCS | Mod: CPTII,S$GLB,, | Performed by: OTOLARYNGOLOGY

## 2022-07-21 PROCEDURE — 3008F BODY MASS INDEX DOCD: CPT | Mod: CPTII,S$GLB,, | Performed by: OTOLARYNGOLOGY

## 2022-07-21 PROCEDURE — 3044F PR MOST RECENT HEMOGLOBIN A1C LEVEL <7.0%: ICD-10-PCS | Mod: CPTII,S$GLB,, | Performed by: OTOLARYNGOLOGY

## 2022-07-21 PROCEDURE — 3044F HG A1C LEVEL LT 7.0%: CPT | Mod: CPTII,S$GLB,, | Performed by: OTOLARYNGOLOGY

## 2022-07-21 NOTE — PROGRESS NOTES
"  Subjective:      Ganesh Schroeder is a 36 y.o. female who comes for follow-up 1 week status-post septum and turbinate surgery.  Her last visit with me was on 2/1/2022.  Some congestion, minimal bleeding or pain.  Saline rinse goes through only partially.    SNOT-22 score: : (P) 46  NOSE score:: (P) 75%  ETDQ-7 score:: (P) 2.1      Objective:     Ht 5' 4" (1.626 m)   Wt 68 kg (150 lb)   BMI 25.75 kg/m²      General:   not in distress   Nasal:  edematous mucosa   incision intact   crusting scabs removed with scissors   no septal hematoma   no bleeding   Oral Cavity:   clear   Oropharynx:   no bleeding   Neck:   nontender       Procedure     None        Data Reviewed    None.      Assessment:     Doing well following septoplasty and turbinate reduction with CB resection.    1. Nasal septal deviation    2. Hypertrophy of inferior nasal turbinate    3. Moon bullosa         Plan:     Continue saline rinse BID.  Follow up in about 1 month (around 8/21/2022).        "

## 2022-08-25 ENCOUNTER — OFFICE VISIT (OUTPATIENT)
Dept: OTOLARYNGOLOGY | Facility: CLINIC | Age: 37
End: 2022-08-25
Payer: COMMERCIAL

## 2022-08-25 VITALS — BODY MASS INDEX: 26.91 KG/M2 | WEIGHT: 157.63 LBS | HEIGHT: 64 IN

## 2022-08-25 DIAGNOSIS — J34.3 HYPERTROPHY OF INFERIOR NASAL TURBINATE: ICD-10-CM

## 2022-08-25 DIAGNOSIS — J34.2 NASAL SEPTAL DEVIATION: Primary | ICD-10-CM

## 2022-08-25 DIAGNOSIS — J34.89 CONCHA BULLOSA: ICD-10-CM

## 2022-08-25 PROCEDURE — 1160F PR REVIEW ALL MEDS BY PRESCRIBER/CLIN PHARMACIST DOCUMENTED: ICD-10-PCS | Mod: CPTII,S$GLB,, | Performed by: OTOLARYNGOLOGY

## 2022-08-25 PROCEDURE — 3044F PR MOST RECENT HEMOGLOBIN A1C LEVEL <7.0%: ICD-10-PCS | Mod: CPTII,S$GLB,, | Performed by: OTOLARYNGOLOGY

## 2022-08-25 PROCEDURE — 1159F MED LIST DOCD IN RCRD: CPT | Mod: CPTII,S$GLB,, | Performed by: OTOLARYNGOLOGY

## 2022-08-25 PROCEDURE — 99999 PR PBB SHADOW E&M-EST. PATIENT-LVL III: CPT | Mod: PBBFAC,,, | Performed by: OTOLARYNGOLOGY

## 2022-08-25 PROCEDURE — 99024 PR POST-OP FOLLOW-UP VISIT: ICD-10-PCS | Mod: S$GLB,,, | Performed by: OTOLARYNGOLOGY

## 2022-08-25 PROCEDURE — 1159F PR MEDICATION LIST DOCUMENTED IN MEDICAL RECORD: ICD-10-PCS | Mod: CPTII,S$GLB,, | Performed by: OTOLARYNGOLOGY

## 2022-08-25 PROCEDURE — 3008F PR BODY MASS INDEX (BMI) DOCUMENTED: ICD-10-PCS | Mod: CPTII,S$GLB,, | Performed by: OTOLARYNGOLOGY

## 2022-08-25 PROCEDURE — 99999 PR PBB SHADOW E&M-EST. PATIENT-LVL III: ICD-10-PCS | Mod: PBBFAC,,, | Performed by: OTOLARYNGOLOGY

## 2022-08-25 PROCEDURE — 1160F RVW MEDS BY RX/DR IN RCRD: CPT | Mod: CPTII,S$GLB,, | Performed by: OTOLARYNGOLOGY

## 2022-08-25 PROCEDURE — 99024 POSTOP FOLLOW-UP VISIT: CPT | Mod: S$GLB,,, | Performed by: OTOLARYNGOLOGY

## 2022-08-25 PROCEDURE — 3008F BODY MASS INDEX DOCD: CPT | Mod: CPTII,S$GLB,, | Performed by: OTOLARYNGOLOGY

## 2022-08-25 PROCEDURE — 3044F HG A1C LEVEL LT 7.0%: CPT | Mod: CPTII,S$GLB,, | Performed by: OTOLARYNGOLOGY

## 2022-08-25 NOTE — PROGRESS NOTES
"  Subjective:      Ganesh Schroeder is a 37 y.o. female who comes for follow-up 1 month status-post septum and turbinate surgery with bilat moon resection.  Her last visit with me was on 7/21/2022.  Doing very well, breathing well, sleeping much better.  Tapering use of saline rinse.    SNOT-22 score: : (P) 2  NOSE score:: (P) 15%  ETDQ-7 score:: (P) 1      Objective:     Ht 5' 4" (1.626 m)   Wt 71.5 kg (157 lb 10.1 oz)   BMI 27.06 kg/m²      General:   not in distress   Nasal:  edematous mucosa   incision intact   no septal hematoma   no bleeding   Oral Cavity:   clear   Oropharynx:   no bleeding   Neck:   nontender       Procedure     None        Data Reviewed    None.      Assessment:     Doing well following septoplasty and turbinate reduction.    1. Nasal septal deviation    2. Hypertrophy of inferior nasal turbinate    3. Moon bullosa         Plan:     Doing well!  Salin prn.  Follow up if symptoms worsen or fail to improve.          "

## 2023-05-25 ENCOUNTER — PATIENT MESSAGE (OUTPATIENT)
Dept: OTOLARYNGOLOGY | Facility: CLINIC | Age: 38
End: 2023-05-25
Payer: COMMERCIAL

## 2023-06-29 ENCOUNTER — OFFICE VISIT (OUTPATIENT)
Dept: OTOLARYNGOLOGY | Facility: CLINIC | Age: 38
End: 2023-06-29
Payer: COMMERCIAL

## 2023-06-29 VITALS
BODY MASS INDEX: 26.6 KG/M2 | DIASTOLIC BLOOD PRESSURE: 71 MMHG | HEART RATE: 86 BPM | SYSTOLIC BLOOD PRESSURE: 124 MMHG | WEIGHT: 155 LBS

## 2023-06-29 DIAGNOSIS — J34.1 MUCOUS RETENTION CYST OF MAXILLARY SINUS: Primary | ICD-10-CM

## 2023-06-29 PROCEDURE — 3074F SYST BP LT 130 MM HG: CPT | Mod: CPTII,S$GLB,, | Performed by: OTOLARYNGOLOGY

## 2023-06-29 PROCEDURE — 3078F DIAST BP <80 MM HG: CPT | Mod: CPTII,S$GLB,, | Performed by: OTOLARYNGOLOGY

## 2023-06-29 PROCEDURE — 99213 OFFICE O/P EST LOW 20 MIN: CPT | Mod: 25,S$GLB,, | Performed by: OTOLARYNGOLOGY

## 2023-06-29 PROCEDURE — 1159F PR MEDICATION LIST DOCUMENTED IN MEDICAL RECORD: ICD-10-PCS | Mod: CPTII,S$GLB,, | Performed by: OTOLARYNGOLOGY

## 2023-06-29 PROCEDURE — 3074F PR MOST RECENT SYSTOLIC BLOOD PRESSURE < 130 MM HG: ICD-10-PCS | Mod: CPTII,S$GLB,, | Performed by: OTOLARYNGOLOGY

## 2023-06-29 PROCEDURE — 99999 PR PBB SHADOW E&M-EST. PATIENT-LVL III: ICD-10-PCS | Mod: PBBFAC,,, | Performed by: OTOLARYNGOLOGY

## 2023-06-29 PROCEDURE — 1160F RVW MEDS BY RX/DR IN RCRD: CPT | Mod: CPTII,S$GLB,, | Performed by: OTOLARYNGOLOGY

## 2023-06-29 PROCEDURE — 3008F PR BODY MASS INDEX (BMI) DOCUMENTED: ICD-10-PCS | Mod: CPTII,S$GLB,, | Performed by: OTOLARYNGOLOGY

## 2023-06-29 PROCEDURE — 3008F BODY MASS INDEX DOCD: CPT | Mod: CPTII,S$GLB,, | Performed by: OTOLARYNGOLOGY

## 2023-06-29 PROCEDURE — 99999 PR PBB SHADOW E&M-EST. PATIENT-LVL III: CPT | Mod: PBBFAC,,, | Performed by: OTOLARYNGOLOGY

## 2023-06-29 PROCEDURE — 3078F PR MOST RECENT DIASTOLIC BLOOD PRESSURE < 80 MM HG: ICD-10-PCS | Mod: CPTII,S$GLB,, | Performed by: OTOLARYNGOLOGY

## 2023-06-29 PROCEDURE — 1160F PR REVIEW ALL MEDS BY PRESCRIBER/CLIN PHARMACIST DOCUMENTED: ICD-10-PCS | Mod: CPTII,S$GLB,, | Performed by: OTOLARYNGOLOGY

## 2023-06-29 PROCEDURE — 99213 PR OFFICE/OUTPT VISIT, EST, LEVL III, 20-29 MIN: ICD-10-PCS | Mod: 25,S$GLB,, | Performed by: OTOLARYNGOLOGY

## 2023-06-29 PROCEDURE — 1159F MED LIST DOCD IN RCRD: CPT | Mod: CPTII,S$GLB,, | Performed by: OTOLARYNGOLOGY

## 2023-06-29 NOTE — PROGRESS NOTES
Subjective:      Ganesh is a 37 y.o. female who comes for a new complaint of an abnormal finding on a dental x-ray.  Her last visit with me was on 8/25/2022.  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  She is asymptomatic, including no hyposmia, rhinorrhea or facial pressure.  She had routine dental films done on about 5/25/23 which showed a partial opacity of the left maxillary sinus, which she provides for me to review.  She has not used any sprays.        %         The patient's medications, allergies, past medical, surgical, social and family histories were reviewed and updated as appropriate.    A detailed review of systems was obtained with pertinent positives as per the above HPI, and otherwise negative.        Objective:     /71 (BP Location: Right arm, Patient Position: Sitting)   Pulse 86   Wt 70.3 kg (154 lb 15.7 oz)   BMI 26.60 kg/m²        Constitutional:   She appears well-developed. She is cooperative.     Head:  Normocephalic.     Nose:  No mucosal edema, rhinorrhea, septal deviation or polyps. No epistaxis. Turbinates normal, no turbinate masses and no turbinate hypertrophy.  Right sinus exhibits no maxillary sinus tenderness and no frontal sinus tenderness. Left sinus exhibits no maxillary sinus tenderness and no frontal sinus tenderness.     Mouth/Throat  Oropharynx clear and moist without lesions or asymmetry. No oropharyngeal exudate or posterior oropharyngeal erythema.     Neck:  No adenopathy. Normal range of motion present.     She has no cervical adenopathy.     Procedure    Nasal endoscopy performed.  See procedure note.        Data Reviewed    WBC (K/uL)   Date Value   05/03/2022 5.01     Eosinophil % (%)   Date Value   05/03/2022 3.0     Eos # (K/uL)   Date Value   05/03/2022 0.2     Platelets (K/uL)   Date Value   05/03/2022 278     Glucose  (mg/dL)   Date Value   05/03/2022 75     IgE (IU/mL)   Date Value   12/07/2020 58           Assessment:     1. Mucous retention cyst of maxillary sinus         Plan:     Reassurance.  If symptoms develop we will consider CT of the sinuses.  Follow up if symptoms worsen or fail to improve.

## 2023-07-07 ENCOUNTER — OFFICE VISIT (OUTPATIENT)
Dept: FAMILY MEDICINE | Facility: CLINIC | Age: 38
End: 2023-07-07
Payer: COMMERCIAL

## 2023-07-07 ENCOUNTER — LAB VISIT (OUTPATIENT)
Dept: LAB | Facility: HOSPITAL | Age: 38
End: 2023-07-07
Attending: FAMILY MEDICINE
Payer: COMMERCIAL

## 2023-07-07 VITALS
BODY MASS INDEX: 26.31 KG/M2 | RESPIRATION RATE: 18 BRPM | SYSTOLIC BLOOD PRESSURE: 118 MMHG | TEMPERATURE: 98 F | OXYGEN SATURATION: 98 % | DIASTOLIC BLOOD PRESSURE: 62 MMHG | HEART RATE: 84 BPM | WEIGHT: 154.13 LBS | HEIGHT: 64 IN

## 2023-07-07 DIAGNOSIS — K21.9 GASTROESOPHAGEAL REFLUX DISEASE WITHOUT ESOPHAGITIS: ICD-10-CM

## 2023-07-07 DIAGNOSIS — Z00.00 WELLNESS EXAMINATION: Primary | ICD-10-CM

## 2023-07-07 DIAGNOSIS — Z00.00 WELLNESS EXAMINATION: ICD-10-CM

## 2023-07-07 LAB
ALBUMIN SERPL BCP-MCNC: 4.1 G/DL (ref 3.5–5.2)
ALP SERPL-CCNC: 52 U/L (ref 55–135)
ALT SERPL W/O P-5'-P-CCNC: 15 U/L (ref 10–44)
ANION GAP SERPL CALC-SCNC: 5 MMOL/L (ref 8–16)
AST SERPL-CCNC: 22 U/L (ref 10–40)
BASOPHILS # BLD AUTO: 0.03 K/UL (ref 0–0.2)
BASOPHILS NFR BLD: 0.5 % (ref 0–1.9)
BILIRUB SERPL-MCNC: 0.6 MG/DL (ref 0.1–1)
BUN SERPL-MCNC: 8 MG/DL (ref 6–20)
CALCIUM SERPL-MCNC: 9.8 MG/DL (ref 8.7–10.5)
CHLORIDE SERPL-SCNC: 105 MMOL/L (ref 95–110)
CHOLEST SERPL-MCNC: 186 MG/DL (ref 120–199)
CHOLEST/HDLC SERPL: 3.3 {RATIO} (ref 2–5)
CO2 SERPL-SCNC: 24 MMOL/L (ref 23–29)
CREAT SERPL-MCNC: 1 MG/DL (ref 0.5–1.4)
DIFFERENTIAL METHOD: NORMAL
EOSINOPHIL # BLD AUTO: 0.2 K/UL (ref 0–0.5)
EOSINOPHIL NFR BLD: 3.6 % (ref 0–8)
ERYTHROCYTE [DISTWIDTH] IN BLOOD BY AUTOMATED COUNT: 12.9 % (ref 11.5–14.5)
EST. GFR  (NO RACE VARIABLE): >60 ML/MIN/1.73 M^2
ESTIMATED AVG GLUCOSE: 103 MG/DL (ref 68–131)
GLUCOSE SERPL-MCNC: 83 MG/DL (ref 70–110)
HBA1C MFR BLD: 5.2 % (ref 4–5.6)
HCT VFR BLD AUTO: 37 % (ref 37–48.5)
HDLC SERPL-MCNC: 57 MG/DL (ref 40–75)
HDLC SERPL: 30.6 % (ref 20–50)
HGB BLD-MCNC: 12.5 G/DL (ref 12–16)
IMM GRANULOCYTES # BLD AUTO: 0.01 K/UL (ref 0–0.04)
IMM GRANULOCYTES NFR BLD AUTO: 0.2 % (ref 0–0.5)
LDLC SERPL CALC-MCNC: 120 MG/DL (ref 63–159)
LYMPHOCYTES # BLD AUTO: 1.8 K/UL (ref 1–4.8)
LYMPHOCYTES NFR BLD: 29.1 % (ref 18–48)
MCH RBC QN AUTO: 30.6 PG (ref 27–31)
MCHC RBC AUTO-ENTMCNC: 33.8 G/DL (ref 32–36)
MCV RBC AUTO: 91 FL (ref 82–98)
MONOCYTES # BLD AUTO: 0.5 K/UL (ref 0.3–1)
MONOCYTES NFR BLD: 7.4 % (ref 4–15)
NEUTROPHILS # BLD AUTO: 3.7 K/UL (ref 1.8–7.7)
NEUTROPHILS NFR BLD: 59.2 % (ref 38–73)
NONHDLC SERPL-MCNC: 129 MG/DL
NRBC BLD-RTO: 0 /100 WBC
PLATELET # BLD AUTO: 304 K/UL (ref 150–450)
PMV BLD AUTO: 10.8 FL (ref 9.2–12.9)
POTASSIUM SERPL-SCNC: 3.9 MMOL/L (ref 3.5–5.1)
PROT SERPL-MCNC: 7.6 G/DL (ref 6–8.4)
RBC # BLD AUTO: 4.08 M/UL (ref 4–5.4)
SODIUM SERPL-SCNC: 134 MMOL/L (ref 136–145)
TRIGL SERPL-MCNC: 45 MG/DL (ref 30–150)
WBC # BLD AUTO: 6.18 K/UL (ref 3.9–12.7)

## 2023-07-07 PROCEDURE — 1160F RVW MEDS BY RX/DR IN RCRD: CPT | Mod: CPTII,S$GLB,, | Performed by: FAMILY MEDICINE

## 2023-07-07 PROCEDURE — 99999 PR PBB SHADOW E&M-EST. PATIENT-LVL IV: ICD-10-PCS | Mod: PBBFAC,,, | Performed by: FAMILY MEDICINE

## 2023-07-07 PROCEDURE — 85025 COMPLETE CBC W/AUTO DIFF WBC: CPT | Performed by: FAMILY MEDICINE

## 2023-07-07 PROCEDURE — 99999 PR PBB SHADOW E&M-EST. PATIENT-LVL IV: CPT | Mod: PBBFAC,,, | Performed by: FAMILY MEDICINE

## 2023-07-07 PROCEDURE — 99395 PREV VISIT EST AGE 18-39: CPT | Mod: EP,S$GLB,, | Performed by: FAMILY MEDICINE

## 2023-07-07 PROCEDURE — 3078F DIAST BP <80 MM HG: CPT | Mod: CPTII,S$GLB,, | Performed by: FAMILY MEDICINE

## 2023-07-07 PROCEDURE — 1160F PR REVIEW ALL MEDS BY PRESCRIBER/CLIN PHARMACIST DOCUMENTED: ICD-10-PCS | Mod: CPTII,S$GLB,, | Performed by: FAMILY MEDICINE

## 2023-07-07 PROCEDURE — 1159F PR MEDICATION LIST DOCUMENTED IN MEDICAL RECORD: ICD-10-PCS | Mod: CPTII,S$GLB,, | Performed by: FAMILY MEDICINE

## 2023-07-07 PROCEDURE — 99395 PR PREVENTIVE VISIT,EST,18-39: ICD-10-PCS | Mod: EP,S$GLB,, | Performed by: FAMILY MEDICINE

## 2023-07-07 PROCEDURE — 3074F PR MOST RECENT SYSTOLIC BLOOD PRESSURE < 130 MM HG: ICD-10-PCS | Mod: CPTII,S$GLB,, | Performed by: FAMILY MEDICINE

## 2023-07-07 PROCEDURE — 3074F SYST BP LT 130 MM HG: CPT | Mod: CPTII,S$GLB,, | Performed by: FAMILY MEDICINE

## 2023-07-07 PROCEDURE — 83036 HEMOGLOBIN GLYCOSYLATED A1C: CPT | Performed by: FAMILY MEDICINE

## 2023-07-07 PROCEDURE — 1159F MED LIST DOCD IN RCRD: CPT | Mod: CPTII,S$GLB,, | Performed by: FAMILY MEDICINE

## 2023-07-07 PROCEDURE — 3008F BODY MASS INDEX DOCD: CPT | Mod: CPTII,S$GLB,, | Performed by: FAMILY MEDICINE

## 2023-07-07 PROCEDURE — 3008F PR BODY MASS INDEX (BMI) DOCUMENTED: ICD-10-PCS | Mod: CPTII,S$GLB,, | Performed by: FAMILY MEDICINE

## 2023-07-07 PROCEDURE — 36415 COLL VENOUS BLD VENIPUNCTURE: CPT | Mod: PO | Performed by: FAMILY MEDICINE

## 2023-07-07 PROCEDURE — 80053 COMPREHEN METABOLIC PANEL: CPT | Performed by: FAMILY MEDICINE

## 2023-07-07 PROCEDURE — 80061 LIPID PANEL: CPT | Performed by: FAMILY MEDICINE

## 2023-07-07 PROCEDURE — 3078F PR MOST RECENT DIASTOLIC BLOOD PRESSURE < 80 MM HG: ICD-10-PCS | Mod: CPTII,S$GLB,, | Performed by: FAMILY MEDICINE

## 2023-07-07 RX ORDER — OMEPRAZOLE 40 MG/1
40 CAPSULE, DELAYED RELEASE ORAL DAILY
Qty: 90 CAPSULE | Refills: 3 | Status: SHIPPED | OUTPATIENT
Start: 2023-07-07 | End: 2024-07-06

## 2023-07-07 RX ORDER — NEOMYCIN SULFATE, POLYMYXIN B SULFATE AND DEXAMETHASONE 3.5; 10000; 1 MG/ML; [USP'U]/ML; MG/ML
SUSPENSION/ DROPS OPHTHALMIC
COMMUNITY
Start: 2023-06-21

## 2023-07-07 NOTE — PROGRESS NOTES
"Subjective:       Patient ID: Ganesh Schroeder is a 37 y.o. female.    Chief Complaint: Cough (Started approx. 10 days ago.  Notes chest burning sensation now with coughing.  Mostly occurs at night.)    HPI:  Pleasant 37-year-old patient is here to establish care.  She is wanting to have her annual labs done.  She is in excellent health right now.  She is moving to the [place].  She is been having a dry cough now over the past month or 2.  She is having solid-food dysphagia very rarely.  She has not been taking a proton pump inhibitor today.  I like to try omeprazole 40 mg a day for the next 3 months she feels.      Review of Systems   Constitutional: Negative.    HENT: Negative.     Eyes: Negative.    Respiratory: Negative.     Cardiovascular: Negative.    Gastrointestinal: Negative.    Endocrine: Negative.    Genitourinary: Negative.    Musculoskeletal: Negative.    Skin: Negative.    Allergic/Immunologic: Negative.    Neurological: Negative.    Hematological: Negative.    Psychiatric/Behavioral: Negative.       Objective:      Vitals:    07/07/23 1359   BP: 118/62   Pulse: 84   Resp: 18   Temp: 98.3 °F (36.8 °C)   TempSrc: Oral   SpO2: 98%   Weight: 69.9 kg (154 lb 1.6 oz)   Height: 5' 4" (1.626 m)      Physical Exam  Vitals and nursing note reviewed.   Constitutional:       Appearance: Normal appearance. She is normal weight.   HENT:      Head: Normocephalic and atraumatic.      Nose: Nose normal.      Mouth/Throat:      Mouth: Mucous membranes are moist.      Pharynx: Oropharynx is clear.   Eyes:      Extraocular Movements: Extraocular movements intact.      Conjunctiva/sclera: Conjunctivae normal.      Pupils: Pupils are equal, round, and reactive to light.   Cardiovascular:      Rate and Rhythm: Normal rate and regular rhythm.      Pulses: Normal pulses.      Heart sounds: Normal heart sounds.   Pulmonary:      Effort: Pulmonary effort is normal.      Breath sounds: Normal breath sounds.   Abdominal:      " General: Abdomen is flat. Bowel sounds are normal.      Palpations: Abdomen is soft.   Musculoskeletal:         General: Normal range of motion.      Cervical back: Normal range of motion and neck supple.   Skin:     General: Skin is warm and dry.      Capillary Refill: Capillary refill takes less than 2 seconds.   Neurological:      General: No focal deficit present.      Mental Status: She is alert and oriented to person, place, and time. Mental status is at baseline.   Psychiatric:         Mood and Affect: Mood normal.         Behavior: Behavior normal.         Thought Content: Thought content normal.         Judgment: Judgment normal.       Results for orders placed or performed during the hospital encounter of 07/15/22   POCT urine pregnancy   Result Value Ref Range    POC Preg Test, Ur Negative Negative     Acceptable Yes       Assessment:       1. Wellness examination    2. Gastroesophageal reflux disease without esophagitis        Plan:       Wellness examination  -     Hemoglobin A1C; Future; Expected date: 07/07/2023  -     CBC Auto Differential; Future; Expected date: 07/07/2023  -     Comprehensive Metabolic Panel; Future; Expected date: 07/07/2023  -     Lipid Panel; Future; Expected date: 07/07/2023    Gastroesophageal reflux disease without esophagitis  -     omeprazole (PRILOSEC) 40 MG capsule; Take 1 capsule (40 mg total) by mouth once daily.  Dispense: 90 capsule; Refill: 3          Medication List with Changes/Refills   New Medications    OMEPRAZOLE (PRILOSEC) 40 MG CAPSULE    Take 1 capsule (40 mg total) by mouth once daily.   Current Medications    LEVONORGESTREL (MIRENA) 20 MCG/24 HOURS (7 YRS) 52 MG IUD    1 each by Intrauterine route once.    NEOMYCIN-POLYMYXIN-DEXAMETHASONE (MAXITROL) 3.5MG/ML-10,000 UNIT/ML-0.1 % DRPS    PLACE 1 DROP INTO BOTH EYES THREE TIMES DAILY FOR 1 WEEK   Discontinued Medications    ACETAMINOPHEN (TYLENOL) 650 MG TBSR    Take 1 tablet (650 mg total) by  mouth every 8 (eight) hours.    CETIRIZINE (ZYRTEC) 10 MG TABLET    Take 1 tablet (10 mg total) by mouth once daily.    IBUPROFEN (ADVIL,MOTRIN) 600 MG TABLET    Take 1 tablet (600 mg total) by mouth every 8 (eight) hours as needed for Pain (alternate with tylenol).    ONDANSETRON (ZOFRAN-ODT) 4 MG TBDL    Dissolve 2 tablets (8 mg total) by mouth every 8 (eight) hours as needed (nausea).

## 2023-07-08 ENCOUNTER — PATIENT MESSAGE (OUTPATIENT)
Dept: FAMILY MEDICINE | Facility: CLINIC | Age: 38
End: 2023-07-08
Payer: COMMERCIAL

## 2023-08-22 ENCOUNTER — OFFICE VISIT (OUTPATIENT)
Dept: URGENT CARE | Facility: CLINIC | Age: 38
End: 2023-08-22
Payer: COMMERCIAL

## 2023-08-22 VITALS
HEIGHT: 64 IN | OXYGEN SATURATION: 97 % | TEMPERATURE: 99 F | DIASTOLIC BLOOD PRESSURE: 76 MMHG | SYSTOLIC BLOOD PRESSURE: 111 MMHG | HEART RATE: 85 BPM | BODY MASS INDEX: 26.29 KG/M2 | WEIGHT: 154 LBS | RESPIRATION RATE: 18 BRPM

## 2023-08-22 DIAGNOSIS — R09.81 SINUS CONGESTION: Primary | ICD-10-CM

## 2023-08-22 LAB
CTP QC/QA: YES
SARS-COV-2 AG RESP QL IA.RAPID: NEGATIVE

## 2023-08-22 PROCEDURE — 87811 SARS-COV-2 COVID19 W/OPTIC: CPT | Mod: QW,S$GLB,, | Performed by: PHYSICIAN ASSISTANT

## 2023-08-22 PROCEDURE — 87811 SARS CORONAVIRUS 2 ANTIGEN POCT, MANUAL READ: ICD-10-PCS | Mod: QW,S$GLB,, | Performed by: PHYSICIAN ASSISTANT

## 2023-08-22 PROCEDURE — 99213 OFFICE O/P EST LOW 20 MIN: CPT | Mod: S$GLB,,, | Performed by: PHYSICIAN ASSISTANT

## 2023-08-22 PROCEDURE — 99213 PR OFFICE/OUTPT VISIT, EST, LEVL III, 20-29 MIN: ICD-10-PCS | Mod: S$GLB,,, | Performed by: PHYSICIAN ASSISTANT

## 2023-08-22 NOTE — PROGRESS NOTES
"Subjective:      Patient ID: Ganesh Schroeder is a 38 y.o. female.    Vitals:  height is 5' 4" (1.626 m) and weight is 69.9 kg (154 lb). Her oral temperature is 98.5 °F (36.9 °C). Her blood pressure is 111/76 and her pulse is 85. Her respiration is 18 and oxygen saturation is 97%.     Chief Complaint: Other Misc    Sinus Problem  This is a new problem. The current episode started yesterday. The problem is unchanged. There has been no fever. Her pain is at a severity of 0/10. She is experiencing no pain. Associated symptoms include congestion and sinus pressure. Pertinent negatives include no chills, coughing, diaphoresis, ear pain, headaches, hoarse voice, neck pain, shortness of breath, sneezing, sore throat or swollen glands. Past treatments include nothing.     Constitution: Negative for chills, sweating, fatigue and fever.   HENT:  Positive for congestion, postnasal drip and sinus pressure. Negative for ear pain, drooling, nosebleeds, foreign body in nose, sinus pain, sore throat, trouble swallowing and voice change.    Neck: Negative for neck pain, neck stiffness, painful lymph nodes and neck swelling.   Cardiovascular:  Negative for chest pain, leg swelling, palpitations, sob on exertion and passing out.   Eyes:  Negative for eye pain, eye redness, photophobia, double vision, blurred vision and eyelid swelling.   Respiratory:  Negative for chest tightness, cough, sputum production, bloody sputum, shortness of breath, stridor and wheezing.    Gastrointestinal:  Negative for abdominal pain, abdominal bloating, nausea, vomiting, constipation, diarrhea and heartburn.   Musculoskeletal:  Negative for joint pain, joint swelling, abnormal ROM of joint, back pain, muscle cramps and muscle ache.   Skin:  Negative for rash and hives.   Allergic/Immunologic: Negative for seasonal allergies, food allergies, hives, itching and sneezing.   Neurological:  Negative for dizziness, light-headedness, passing out, facial drooping, " speech difficulty, loss of balance, headaches, altered mental status, loss of consciousness, numbness, tingling and seizures.   Hematologic/Lymphatic: Negative for swollen lymph nodes.   Psychiatric/Behavioral:  Negative for altered mental status and nervous/anxious. The patient is not nervous/anxious.       Objective:     Physical Exam   Constitutional: She is oriented to person, place, and time. She appears well-developed. She is cooperative.  Non-toxic appearance. She does not appear ill. No distress.   HENT:   Head: Normocephalic and atraumatic.   Ears:   Right Ear: Hearing, tympanic membrane, external ear and ear canal normal.   Left Ear: Hearing, tympanic membrane, external ear and ear canal normal.   Nose: Mucosal edema and rhinorrhea present. No nasal deformity. No epistaxis. Right sinus exhibits no maxillary sinus tenderness and no frontal sinus tenderness. Left sinus exhibits no maxillary sinus tenderness and no frontal sinus tenderness.   Mouth/Throat: Uvula is midline and mucous membranes are normal. No trismus in the jaw. Normal dentition. No uvula swelling. Posterior oropharyngeal erythema and cobblestoning present. No oropharyngeal exudate, posterior oropharyngeal edema or tonsillar abscesses. No tonsillar exudate.   Eyes: Conjunctivae and lids are normal. No scleral icterus.   Neck: Trachea normal and phonation normal. Neck supple. No edema present. No erythema present. No neck rigidity present.   Cardiovascular: Normal rate, regular rhythm, normal heart sounds and normal pulses.   Pulmonary/Chest: Effort normal and breath sounds normal. No accessory muscle usage or stridor. No respiratory distress. She has no decreased breath sounds. She has no wheezes. She has no rhonchi. She has no rales.   Abdominal: Normal appearance.   Musculoskeletal: Normal range of motion.         General: No deformity or edema. Normal range of motion.   Lymphadenopathy:     She has no cervical adenopathy.   Neurological:  She is alert and oriented to person, place, and time. She exhibits normal muscle tone. Coordination normal.   Skin: Skin is warm, dry, intact, not diaphoretic, not pale and no rash. Capillary refill takes less than 2 seconds.   Psychiatric: Her speech is normal and behavior is normal. Judgment and thought content normal.   Nursing note and vitals reviewed.      Results for orders placed or performed in visit on 08/22/23   SARS Coronavirus 2 Antigen, POCT Manual Read   Result Value Ref Range    SARS Coronavirus 2 Antigen Negative Negative     Acceptable Yes        Assessment:     1. Sinus congestion        Plan:   Discussed COVID-19 results with patient. Advised close follow-up with PCP and/or Specialist for further evaluation as needed. ER precautions given to patient as well. Patient aware, verbalized understanding and agreed with plan of care.    Sinus congestion  -     SARS Coronavirus 2 Antigen, POCT Manual Read      There are no Patient Instructions on file for this visit.

## 2023-11-29 ENCOUNTER — OFFICE VISIT (OUTPATIENT)
Dept: URGENT CARE | Facility: CLINIC | Age: 38
End: 2023-11-29
Payer: COMMERCIAL

## 2023-11-29 VITALS
SYSTOLIC BLOOD PRESSURE: 130 MMHG | BODY MASS INDEX: 26.29 KG/M2 | RESPIRATION RATE: 18 BRPM | HEART RATE: 89 BPM | DIASTOLIC BLOOD PRESSURE: 80 MMHG | HEIGHT: 64 IN | WEIGHT: 154 LBS | TEMPERATURE: 99 F | OXYGEN SATURATION: 98 %

## 2023-11-29 DIAGNOSIS — K04.7 DENTAL ABSCESS: Primary | ICD-10-CM

## 2023-11-29 PROCEDURE — 99213 PR OFFICE/OUTPT VISIT, EST, LEVL III, 20-29 MIN: ICD-10-PCS | Mod: S$GLB,,, | Performed by: PHYSICIAN ASSISTANT

## 2023-11-29 PROCEDURE — 99213 OFFICE O/P EST LOW 20 MIN: CPT | Mod: S$GLB,,, | Performed by: PHYSICIAN ASSISTANT

## 2023-11-29 RX ORDER — AMOXICILLIN AND CLAVULANATE POTASSIUM 875; 125 MG/1; MG/1
1 TABLET, FILM COATED ORAL 2 TIMES DAILY
Qty: 20 TABLET | Refills: 0 | Status: SHIPPED | OUTPATIENT
Start: 2023-11-29 | End: 2023-12-09

## 2023-11-29 NOTE — PROGRESS NOTES
"Subjective:      Patient ID: Ganesh Schroeder is a 38 y.o. female.    Vitals:  height is 5' 4" (1.626 m) and weight is 69.9 kg (154 lb). Her temperature is 98.9 °F (37.2 °C). Her blood pressure is 130/80 and her pulse is 89. Her respiration is 18 and oxygen saturation is 98%.     Chief Complaint: Abscess    38 year old female presents today with a possible dental abscess. Symptoms started 11/25/2023. States she tried to go to the Dentist but she was unsuccessful to get in today and was told to come to the urgent care. Treatments at home includes nothing. She is requesting an abx be called in. Pain scale 08/10 while eating or drinking anything.     Abscess  Chronicity:  New  Onset:  2 days ago    Location:  Mouth  Associated Symptoms: no fever, no chills, no sweats  Characteristics: painful and swelling    Characteristics: not draining, no itching, no redness, no dryness, no scaling, no peeling, no bruising and no blistering    Pain Scale:  8/10  Treatments Tried:  Nothing  Relieved by:  Nothing  Worsened by:  Nothing      Constitution: Negative for chills and fever.   HENT:  Positive for dental problem.    Gastrointestinal:  Negative for nausea and vomiting.   Skin:  Positive for abscess.      Objective:     Physical Exam   Constitutional: She does not appear ill. No distress.   HENT:   Head: Normocephalic and atraumatic.   Ears:   Right Ear: External ear normal.   Left Ear: External ear normal.   Mouth/Throat:       Area of fluctuance      Comments: Area of fluctuance  Eyes: Conjunctivae are normal. Right eye exhibits no discharge. Left eye exhibits no discharge. Extraocular movement intact   Cardiovascular: Normal rate, regular rhythm and normal heart sounds.   No murmur heard.  Pulmonary/Chest: Effort normal. No respiratory distress.   Abdominal: Normal appearance.   Musculoskeletal: Normal range of motion.         General: Normal range of motion.   Neurological: no focal deficit. She is alert.   Skin: Skin is " warm, dry and not pale. jaundice  Psychiatric: Her behavior is normal. Mood, judgment and thought content normal.   Nursing note and vitals reviewed.      Assessment:     1. Dental abscess        Plan:       Dental abscess    Other orders  -     amoxicillin-clavulanate 875-125mg (AUGMENTIN) 875-125 mg per tablet; Take 1 tablet by mouth 2 (two) times daily. for 10 days  Dispense: 20 tablet; Refill: 0    Tooth Abscess Discharge Instructions   About this topic   A tooth abscess is a collection of pus from an infection. You may have had an injury to a tooth, unhealthy gums, or tooth decay that led to your abscess. You may need a root canal or to have your tooth pulled because of an abscess. You will need antibiotics to treat the infection. It is important to take all of your antibiotics even if you start to feel better. A tooth abscess can become very serious if it is not fully treated.  This is a cross section of a tooth.  There is a cavity extending from the top of the tooth down into the root and into the gum area.  The root and gum are filled with a green material labeled an abscess.   What care is needed at home?   Ask your dentist what you need to do when you go home. Make sure you ask questions if you do not understand what the dentist says.  Avoid very cold or very hot food and drinks. These can make pain worse.  If you smoke, try to quit. Your doctor or nurse can help.  You may want to take medicines like ibuprofen, naproxen, or acetaminophen to help with pain.  Brush your teeth at least 2 times a day. Use toothpaste with fluoride.  Use dental floss to clean between your teeth every day.  What follow-up care is needed?   Your dentist may ask you to make visits to the office to check your progress. Be sure to keep these visits. You may need more treatment.  You may need to go see other experts if you have gum disease, need to have a root canal, or a tooth needs to be pulled.  What drugs may be needed?   Your  dentist may order drugs to:  Help with pain  Prevent or fight an infection  Will physical activity be limited?   You may have to limit your activity for a short time if you have pain from the tooth abscess. Talk to your dentist about the right amount of activity for you.  What problems could happen?   Infection may spread to other parts of your body  Tooth loss  Problems eating  Bad breath  Swelling  Problem swallowing or breathing  Pain  What can be done to prevent this health problem?   See your dentist at least 2 times a year for a cleaning and check ups.  Wear a mouth guard or headgear when playing sports.  When do I need to call the doctor?   You have a fever of 100.4°F (38°C) or higher or chills.  You have swelling or pain in your neck or throat.  You are not able to open your mouth or eat.  You have trouble breathing.  You have very bad pain that is not helped by pain medicines.  You have swelling in your gums or face.  You have discharge around the tooth.  You have a bad taste in your mouth.  You have lots of bleeding from the gums.  You have more pain after having a tooth pulled.  Teach Back: Helping You Understand   The Teach Back Method helps you understand the information we are giving you. After you talk with the staff, tell them in your own words what you learned. This helps to make sure the staff has described each thing clearly. It also helps to explain things that may have been confusing. Before going home, make sure you can do these:  I can tell you about my condition.  I can tell you how I should rinse my mouth.  I can tell you what I will do if I have more tooth pain or swelling.  Where can I learn more?   [facility] Choices  http://www.nhs.uk/Conditions/Dental-abscess/Pages/Introduction.aspx   Last Reviewed Date   2021-06-22  Consumer Information Use and Disclaimer   This information is not specific medical advice and does not replace information you receive from your health care provider. This is only  a brief summary of general information. It does NOT include all information about conditions, illnesses, injuries, tests, procedures, treatments, therapies, discharge instructions or life-style choices that may apply to you. You must talk with your health care provider for complete information about your health and treatment options. This information should not be used to decide whether or not to accept your health care providers advice, instructions or recommendations. Only your health care provider has the knowledge and training to provide advice that is right for you.  Copyright   Copyright © 2021 [employer] and its affiliates and/or licensors. All rights reserved.

## 2024-07-09 ENCOUNTER — PATIENT MESSAGE (OUTPATIENT)
Dept: ADMINISTRATIVE | Facility: HOSPITAL | Age: 39
End: 2024-07-09
Payer: COMMERCIAL

## 2024-08-14 DIAGNOSIS — Z12.31 OTHER SCREENING MAMMOGRAM: ICD-10-CM

## 2024-09-09 ENCOUNTER — OFFICE VISIT (OUTPATIENT)
Dept: URGENT CARE | Facility: CLINIC | Age: 39
End: 2024-09-09
Payer: COMMERCIAL

## 2024-09-09 VITALS
HEIGHT: 64 IN | TEMPERATURE: 98 F | RESPIRATION RATE: 18 BRPM | DIASTOLIC BLOOD PRESSURE: 80 MMHG | BODY MASS INDEX: 26.29 KG/M2 | WEIGHT: 154 LBS | SYSTOLIC BLOOD PRESSURE: 114 MMHG | OXYGEN SATURATION: 95 % | HEART RATE: 68 BPM

## 2024-09-09 DIAGNOSIS — R09.81 NASAL CONGESTION: Primary | ICD-10-CM

## 2024-09-09 LAB
CTP QC/QA: YES
SARS-COV-2 AG RESP QL IA.RAPID: NEGATIVE

## 2024-09-09 PROCEDURE — 87811 SARS-COV-2 COVID19 W/OPTIC: CPT | Mod: QW,S$GLB,, | Performed by: FAMILY MEDICINE

## 2024-09-09 PROCEDURE — 99214 OFFICE O/P EST MOD 30 MIN: CPT | Mod: S$GLB,,, | Performed by: FAMILY MEDICINE

## 2024-09-09 RX ORDER — AZELASTINE 1 MG/ML
1 SPRAY, METERED NASAL 2 TIMES DAILY
Qty: 30 ML | Refills: 2 | Status: SHIPPED | OUTPATIENT
Start: 2024-09-09 | End: 2025-09-09

## 2024-09-09 RX ORDER — AMOXICILLIN AND CLAVULANATE POTASSIUM 875; 125 MG/1; MG/1
1 TABLET, FILM COATED ORAL 2 TIMES DAILY
Qty: 20 TABLET | Refills: 0 | Status: SHIPPED | OUTPATIENT
Start: 2024-09-09 | End: 2024-09-19

## 2024-09-09 RX ORDER — METHYLPREDNISOLONE 4 MG/1
TABLET ORAL
Qty: 1 EACH | Refills: 0 | Status: SHIPPED | OUTPATIENT
Start: 2024-09-09

## 2024-09-09 RX ORDER — FLUTICASONE PROPIONATE 50 MCG
1 SPRAY, SUSPENSION (ML) NASAL 2 TIMES DAILY
Qty: 16 G | Refills: 2 | Status: SHIPPED | OUTPATIENT
Start: 2024-09-09

## 2024-09-09 NOTE — PROGRESS NOTES
"Subjective:      Patient ID: Ganesh Schroeder is a 39 y.o. female.    Vitals:  height is 5' 4" (1.626 m) and weight is 69.9 kg (154 lb). Her oral temperature is 98.2 °F (36.8 °C). Her blood pressure is 114/80 and her pulse is 68. Her respiration is 18 and oxygen saturation is 95%.     Chief Complaint: Nasal Congestion (Maybe sinus infection - Entered by patient) and Sinus Problem    Pt presents with c/o nasal congestion, ear fullness, headaches, sinus pressure Onset- Yesterday. Pt states has taken Flonase for symptoms, no relief. Pain score 6/10    Sinus Problem  This is a new problem. The current episode started yesterday. The problem is unchanged. There has been no fever. The fever has been present for Less than 1 day. Her pain is at a severity of 6/10. The pain is moderate. Associated symptoms include congestion, ear pain, headaches and sinus pressure. Pertinent negatives include no chills, coughing, diaphoresis, hoarse voice, neck pain, shortness of breath, sneezing, sore throat or swollen glands. Past treatments include saline nose sprays. The treatment provided no relief.       Constitution: Negative for chills and sweating.   HENT:  Positive for ear pain, congestion and sinus pressure. Negative for sore throat.    Neck: Negative for neck pain.   Respiratory:  Negative for cough and shortness of breath.    Allergic/Immunologic: Negative for sneezing.   Neurological:  Positive for headaches.      Objective:     Physical Exam    Physical Exam  Vitals signs and nursing note reviewed.   Constitutional:       Appearance: Pt is well-developed. Alert, NAD.  Pt is cooperative.  Non-toxic appearance.  HENT:      Head: Normocephalic and atraumatic. .      Right Ear: External ear normal.      Left Ear: External ear normal.   Eyes:      General: Lids are normal.      Conjunctiva/sclera: Conjunctivae normal. Visual tracking is normal. Right eye exhibits no exudate. Left eye exhibits no exudate. No scleral icterus.     " Pupils: Pupils are equal, round  Neck:      Musculoskeletal: range of motion without pain and neck supple.      Trachea: Trachea and phonation normal.   Throat: No apparent pharyngeal edema or swelling no tonsil swelling or asymmetry  Cardiovascular:      Rate and Rhythm: Normal Rhythm. Extremities well perfused.   Pulmonary:      Effort: Pulmonary effort is normal. No respiratory distress. NO stridor or difficulty breathing     Breath sounds: Normal breath sounds.   Abdomen: NO obvious distention.  Musculoskeletal: Normal range of motion. No ambulation issues  Skin:     General: Skin is warm and dry. No open wounds or abrasions. No petechiae No cyanosis  no jaundice not diaphoretic, not pale, not purpuric  Neurological:      Mental Status:Pt is alert and oriented to person, place, and time.   Psychiatric:         Speech: Speech normal.         Behavior: Behavior normal.         Thought Content: Thought content normal.         Judgment: Judgment normal.       Assessment:     1. Nasal congestion        Plan:   Pocket script given to patient in case symptoms fail to improve or worsen. Pt advised on risk of taking medication too soon and verbalized understanding.    Explained r/b and patient v/u to fill only if symptoms progress or worsen after maximizing home remedies sheet given and/or explained during encounter.       Nasal congestion  -     SARS Coronavirus 2 Antigen, POCT Manual Read    Other orders  -     fluticasone propionate (FLONASE) 50 mcg/actuation nasal spray; 1 spray (50 mcg total) by Each Nostril route 2 (two) times daily.  Dispense: 16 g; Refill: 2  -     azelastine (ASTELIN) 137 mcg (0.1 %) nasal spray; 1 spray (137 mcg total) by Nasal route 2 (two) times daily.  Dispense: 30 mL; Refill: 2  -     methylPREDNISolone (MEDROL DOSEPACK) 4 mg tablet; Dispense one to. use as directed  Dispense: 1 each; Refill: 0  -     amoxicillin-clavulanate 875-125mg (AUGMENTIN) 875-125 mg per tablet; Take 1 tablet by  mouth 2 (two) times daily. for 10 days  Dispense: 20 tablet; Refill: 0

## 2024-10-01 ENCOUNTER — HOSPITAL ENCOUNTER (OUTPATIENT)
Dept: RADIOLOGY | Facility: HOSPITAL | Age: 39
Discharge: HOME | End: 2024-10-01
Attending: FAMILY MEDICINE
Payer: COMMERCIAL

## 2024-10-01 DIAGNOSIS — Z12.31 OTHER SCREENING MAMMOGRAM: ICD-10-CM

## 2024-10-01 PROCEDURE — 77063 BREAST TOMOSYNTHESIS BI: CPT | Mod: 26,,, | Performed by: RADIOLOGY

## 2024-10-01 PROCEDURE — 77067 SCR MAMMO BI INCL CAD: CPT | Mod: 26,,, | Performed by: RADIOLOGY

## 2024-10-01 PROCEDURE — 77067 SCR MAMMO BI INCL CAD: CPT | Mod: TC,PO

## 2024-12-24 ENCOUNTER — OFFICE VISIT (OUTPATIENT)
Dept: FAMILY MEDICINE | Facility: CLINIC | Age: 39
End: 2024-12-24
Payer: COMMERCIAL

## 2024-12-24 VITALS
DIASTOLIC BLOOD PRESSURE: 84 MMHG | SYSTOLIC BLOOD PRESSURE: 120 MMHG | HEIGHT: 64 IN | OXYGEN SATURATION: 97 % | BODY MASS INDEX: 29.02 KG/M2 | HEART RATE: 76 BPM | WEIGHT: 170 LBS

## 2024-12-24 DIAGNOSIS — E66.3 OVERWEIGHT (BMI 25.0-29.9): ICD-10-CM

## 2024-12-24 DIAGNOSIS — Z00.00 WELLNESS EXAMINATION: Primary | ICD-10-CM

## 2024-12-24 DIAGNOSIS — K21.9 GASTROESOPHAGEAL REFLUX DISEASE WITHOUT ESOPHAGITIS: ICD-10-CM

## 2024-12-24 DIAGNOSIS — G44.209 TENSION-TYPE HEADACHE, NOT INTRACTABLE, UNSPECIFIED CHRONICITY PATTERN: ICD-10-CM

## 2024-12-24 PROCEDURE — 99999 PR PBB SHADOW E&M-EST. PATIENT-LVL III: CPT | Mod: PBBFAC,,, | Performed by: FAMILY MEDICINE

## 2024-12-24 NOTE — PROGRESS NOTES
Patient ID: Ganesh Schroeder is a 39 y.o. female.    Chief Complaint: Annual Exam (Pt has been having headaches/)    History of Present Illness    CHIEF COMPLAINT:  Patient presents today for her annual check-up.    GERD AND DYSPHAGIA:  She is currently on omeprazole 40 mg daily for gastroesophageal reflux and solid food dysphagia. She reports food is going down without difficulty.    HEADACHES:  She has a history of tension-type headaches which have decreased in frequency recently. However, headaches continue to return with severe intensity. She denies migrainous features.    ALLERGIES:  She has perennial allergic rhinitis managed with Asteline and Flonase nasal sprays.    WEIGHT MANAGEMENT:  Her BMI is 29.      ROS:  General: -fever, -chills, -fatigue, -weight gain, -weight loss  Eyes: -vision changes, -redness, -discharge  ENT: -ear pain, -nasal congestion, -sore throat, -difficulty swallowing  Cardiovascular: -chest pain, -palpitations, -lower extremity edema  Respiratory: -cough, -shortness of breath  Gastrointestinal: -abdominal pain, -nausea, -vomiting, -diarrhea, -constipation, -blood in stool  Genitourinary: -dysuria, -hematuria, -frequency  Musculoskeletal: -joint pain, -muscle pain  Skin: -rash, -lesion  Neurological: +headache, -dizziness, -numbness, -tingling  Psychiatric: -anxiety, -depression, -sleep difficulty         Physical Exam    Vitals: BMI: ___. BMI 25.0 - 29.9.  General: No acute distress. Well-developed. Well-nourished.  Eyes: EOMI. Sclerae anicteric.  HENT: Normocephalic. Atraumatic. Nares patent. Moist oral mucosa.  Ears: Bilateral TMs clear. Bilateral EACs clear.  Cardiovascular: Regular rate. Regular rhythm. No murmurs. No rubs. No gallops. Normal S1, S2.  Respiratory: Normal respiratory effort. Clear to auscultation bilaterally. No rales. No rhonchi. No wheezing.  Abdomen: Soft. Non-tender. Non-distended. Normoactive bowel sounds.  Musculoskeletal: No  obvious deformity.  Extremities: No  lower extremity edema.  Neurological: Alert & oriented x3. No slurred speech. Normal gait.  Psychiatric: Normal mood. Normal affect. Good insight. Good judgment.  Skin: Warm. Dry. No rash.         Assessment & Plan    IMPRESSION:  - Assessed GERD and solid food dysphagia; current treatment with omeprazole 40mg daily appears effective  - Evaluated recurrent severe headaches, not consistent with migraine  - Considered Fioricet for muscle tension-type headaches if they recur  - Discussed weight management options, including potential use of Zepbound (tirzepatide)  - Noted patient's BMI of 29  - Patient declined flu vaccine and COVID-19 vaccine series completion for now  - Assessed ongoing management of perennial allergic rhinitis with Astelin and Flonase nasal sprays    GASTROESOPHAGEAL REFLUX DISEASE (GERD):  - Continued omeprazole 40 mg daily for GERD management.  - Patient has gastroesophageal reflux.  - Current treatment with omeprazole 40 mg daily is effective, and food goes down adequately.    DYSPHAGIA:  - Continued omeprazole 40 mg daily for dysphagia management.  - Patient has solid food dysphagia.  - Current treatment appears effective, as food goes down adequately.    TENSION-TYPE HEADACHES:  - Referred the patient to neurology for detailed evaluation of recurrent severe headaches.  - Patient has a history of tension-type headaches, which have recently decreased in frequency but still return and are severe.  - Plan to prescribe Fioricet for muscle tension-type headaches if they recur.    PERENNIAL ALLERGIC RHINITIS:  - Continued Astelin nasal spray for perennial allergic rhinitis management.  - Continued Flonase nasal spray for perennial allergic rhinitis management.  - Patient has a history of perennial allergic rhinitis.    PATIENT REFUSAL OF VACCINATION:  - Recommend flu vaccine and completion of COVID-19 vaccine series.  - Patient declined recommended vaccinations for now.  - Will reassess vaccination  status at next year's appointment.    WEIGHT MANAGEMENT:  - Explained the mechanism of action and efficacy data for Zepbound (tirzepatide) in weight management.  - Discussed the role of gut hormones in weight regulation and their potential deficiency in overweight individuals.  - Patient's current BMI is 29.  - Discussed weight management options including Zepbound medication, [employer] [facility] [employer], and the importance of exercise and portion control.  - Patient to exercise regularly.  - Patient to watch portion sizes during meals.    FOLLOW UP:  - Follow up in 1 year for annual physical.              Follow up in about 1 year (around 12/24/2025).    This note was generated with the assistance of ambient listening technology. Verbal consent was obtained by the patient and accompanying visitor(s) for the recording of patient appointment to facilitate this note. I attest to having reviewed and edited the generated note for accuracy, though some syntax or spelling errors may persist. Please contact the author of this note for any clarification.

## 2024-12-30 ENCOUNTER — PATIENT MESSAGE (OUTPATIENT)
Dept: FAMILY MEDICINE | Facility: CLINIC | Age: 39
End: 2024-12-30
Payer: COMMERCIAL

## 2024-12-30 ENCOUNTER — LAB VISIT (OUTPATIENT)
Dept: LAB | Facility: HOSPITAL | Age: 39
End: 2024-12-30
Attending: FAMILY MEDICINE
Payer: COMMERCIAL

## 2024-12-30 DIAGNOSIS — E66.3 OVERWEIGHT (BMI 25.0-29.9): ICD-10-CM

## 2024-12-30 DIAGNOSIS — K21.9 GASTROESOPHAGEAL REFLUX DISEASE WITHOUT ESOPHAGITIS: ICD-10-CM

## 2024-12-30 DIAGNOSIS — Z00.00 WELLNESS EXAMINATION: ICD-10-CM

## 2024-12-30 DIAGNOSIS — E78.00 PURE HYPERCHOLESTEROLEMIA: Primary | ICD-10-CM

## 2024-12-30 LAB
ALBUMIN SERPL BCP-MCNC: 4 G/DL (ref 3.5–5.2)
ALP SERPL-CCNC: 61 U/L (ref 40–150)
ALT SERPL W/O P-5'-P-CCNC: 12 U/L (ref 10–44)
ANION GAP SERPL CALC-SCNC: 9 MMOL/L (ref 8–16)
AST SERPL-CCNC: 20 U/L (ref 10–40)
BASOPHILS # BLD AUTO: 0.04 K/UL (ref 0–0.2)
BASOPHILS NFR BLD: 0.8 % (ref 0–1.9)
BILIRUB SERPL-MCNC: 0.6 MG/DL (ref 0.1–1)
BUN SERPL-MCNC: 9 MG/DL (ref 6–20)
CALCIUM SERPL-MCNC: 9.4 MG/DL (ref 8.7–10.5)
CHLORIDE SERPL-SCNC: 105 MMOL/L (ref 95–110)
CHOLEST SERPL-MCNC: 224 MG/DL (ref 120–199)
CHOLEST/HDLC SERPL: 3.7 {RATIO} (ref 2–5)
CO2 SERPL-SCNC: 24 MMOL/L (ref 23–29)
CREAT SERPL-MCNC: 1.1 MG/DL (ref 0.5–1.4)
DIFFERENTIAL METHOD BLD: NORMAL
EOSINOPHIL # BLD AUTO: 0.3 K/UL (ref 0–0.5)
EOSINOPHIL NFR BLD: 6.5 % (ref 0–8)
ERYTHROCYTE [DISTWIDTH] IN BLOOD BY AUTOMATED COUNT: 13.2 % (ref 11.5–14.5)
EST. GFR  (NO RACE VARIABLE): >60 ML/MIN/1.73 M^2
ESTIMATED AVG GLUCOSE: 108 MG/DL (ref 68–131)
GLUCOSE SERPL-MCNC: 92 MG/DL (ref 70–110)
HBA1C MFR BLD: 5.4 % (ref 4–5.6)
HCT VFR BLD AUTO: 39.6 % (ref 37–48.5)
HDLC SERPL-MCNC: 61 MG/DL (ref 40–75)
HDLC SERPL: 27.2 % (ref 20–50)
HGB BLD-MCNC: 12.7 G/DL (ref 12–16)
IMM GRANULOCYTES # BLD AUTO: 0.01 K/UL (ref 0–0.04)
IMM GRANULOCYTES NFR BLD AUTO: 0.2 % (ref 0–0.5)
LDLC SERPL CALC-MCNC: 150 MG/DL (ref 63–159)
LYMPHOCYTES # BLD AUTO: 1.4 K/UL (ref 1–4.8)
LYMPHOCYTES NFR BLD: 29.1 % (ref 18–48)
MCH RBC QN AUTO: 29.8 PG (ref 27–31)
MCHC RBC AUTO-ENTMCNC: 32.1 G/DL (ref 32–36)
MCV RBC AUTO: 93 FL (ref 82–98)
MONOCYTES # BLD AUTO: 0.4 K/UL (ref 0.3–1)
MONOCYTES NFR BLD: 7.6 % (ref 4–15)
NEUTROPHILS # BLD AUTO: 2.7 K/UL (ref 1.8–7.7)
NEUTROPHILS NFR BLD: 55.8 % (ref 38–73)
NONHDLC SERPL-MCNC: 163 MG/DL
NRBC BLD-RTO: 0 /100 WBC
PLATELET # BLD AUTO: 265 K/UL (ref 150–450)
PMV BLD AUTO: 10.6 FL (ref 9.2–12.9)
POTASSIUM SERPL-SCNC: 3.9 MMOL/L (ref 3.5–5.1)
PROT SERPL-MCNC: 7.5 G/DL (ref 6–8.4)
RBC # BLD AUTO: 4.26 M/UL (ref 4–5.4)
SODIUM SERPL-SCNC: 138 MMOL/L (ref 136–145)
TRIGL SERPL-MCNC: 65 MG/DL (ref 30–150)
TSH SERPL DL<=0.005 MIU/L-ACNC: 1.48 UIU/ML (ref 0.4–4)
WBC # BLD AUTO: 4.75 K/UL (ref 3.9–12.7)

## 2024-12-30 PROCEDURE — 84443 ASSAY THYROID STIM HORMONE: CPT | Performed by: FAMILY MEDICINE

## 2024-12-30 PROCEDURE — 80061 LIPID PANEL: CPT | Performed by: FAMILY MEDICINE

## 2024-12-30 PROCEDURE — 85025 COMPLETE CBC W/AUTO DIFF WBC: CPT | Performed by: FAMILY MEDICINE

## 2024-12-30 PROCEDURE — 36415 COLL VENOUS BLD VENIPUNCTURE: CPT | Mod: PO | Performed by: FAMILY MEDICINE

## 2024-12-30 PROCEDURE — 83036 HEMOGLOBIN GLYCOSYLATED A1C: CPT | Performed by: FAMILY MEDICINE

## 2024-12-30 PROCEDURE — 80053 COMPREHEN METABOLIC PANEL: CPT | Performed by: FAMILY MEDICINE

## 2024-12-30 RX ORDER — ROSUVASTATIN CALCIUM 20 MG/1
20 TABLET, COATED ORAL DAILY
Qty: 90 TABLET | Refills: 3 | Status: SHIPPED | OUTPATIENT
Start: 2024-12-30 | End: 2025-12-30

## 2025-04-24 ENCOUNTER — OFFICE VISIT (OUTPATIENT)
Dept: URGENT CARE | Facility: CLINIC | Age: 40
End: 2025-04-24
Payer: COMMERCIAL

## 2025-04-24 VITALS
HEIGHT: 64 IN | BODY MASS INDEX: 29.02 KG/M2 | DIASTOLIC BLOOD PRESSURE: 82 MMHG | WEIGHT: 170 LBS | OXYGEN SATURATION: 98 % | RESPIRATION RATE: 18 BRPM | TEMPERATURE: 99 F | SYSTOLIC BLOOD PRESSURE: 119 MMHG

## 2025-04-24 DIAGNOSIS — J06.9 VIRAL URI WITH COUGH: Primary | ICD-10-CM

## 2025-04-24 DIAGNOSIS — R09.81 SINUS CONGESTION: ICD-10-CM

## 2025-04-24 LAB
B-HCG UR QL: NEGATIVE
CTP QC/QA: YES
POC MOLECULAR INFLUENZA A AGN: NEGATIVE
POC MOLECULAR INFLUENZA B AGN: NEGATIVE
SARS CORONAVIRUS 2 ANTIGEN: NEGATIVE

## 2025-04-24 PROCEDURE — 87502 INFLUENZA DNA AMP PROBE: CPT | Mod: QW,S$GLB,, | Performed by: NURSE PRACTITIONER

## 2025-04-24 RX ORDER — FLUTICASONE PROPIONATE 50 MCG
1 SPRAY, SUSPENSION (ML) NASAL DAILY PRN
Qty: 16 G | Refills: 0 | Status: SHIPPED | OUTPATIENT
Start: 2025-04-24

## 2025-04-24 RX ORDER — METHYLPREDNISOLONE 4 MG/1
TABLET ORAL
Qty: 21 EACH | Refills: 0 | Status: SHIPPED | OUTPATIENT
Start: 2025-04-24 | End: 2025-05-15

## 2025-04-24 RX ORDER — AZELASTINE 1 MG/ML
1 SPRAY, METERED NASAL 2 TIMES DAILY PRN
Qty: 30 ML | Refills: 0 | Status: SHIPPED | OUTPATIENT
Start: 2025-04-24

## 2025-04-24 RX ORDER — BENZONATATE 200 MG/1
200 CAPSULE ORAL 3 TIMES DAILY PRN
Qty: 30 CAPSULE | Refills: 0 | Status: SHIPPED | OUTPATIENT
Start: 2025-04-24

## 2025-04-24 NOTE — PATIENT INSTRUCTIONS
- You received a steroid, Medrol Dosepak today.  This can elevate your blood pressure, elevate your blood sugar, water weight gain, nervous energy, redness to the face and dimpling of the skin where the shot goes in.   - Do not use steroids more than 3 times per year.   - If you have diabetes, please check you blood sugar frequently.  - If you have high blood pressure, please check your blood pressure frequently.       INSTRUCTIONS:  - Rest.  - Drink plenty of fluids.  - Take Tylenol and/or Ibuprofen as directed as needed for fever/pain.  Do not take more than the recommended dose.  - follow up with your PCP within the next 1-2 weeks as needed.  - You must understand that you have received an Urgent Care treatment only and that you may be released before all of your medical problems are known or treated.   - You, the patient, will arrange for follow up care as instructed.   - If your condition worsens or fails to improve we recommend that you receive another evaluation at the ER immediately or contact your PCP to discuss your concerns.   - You can call [phone] or [phone] to help schedule an appointment with the appropriate provider.     -If you smoke cigarettes, it would be beneficial for you to stop.

## 2025-04-24 NOTE — PROGRESS NOTES
"Subjective:      Patient ID: Ganesh Schroeder is a 39 y.o. female.    Vitals:  height is 5' 4" (1.626 m) and weight is 77.1 kg (169 lb 15.6 oz). Her temperature is 98.5 °F (36.9 °C). Her blood pressure is 119/82. Her respiration is 18 and oxygen saturation is 98%.     Chief Complaint: Sinus Problem    Patient complains of nasal congestion and swollen lymph nodes in her neck for the past 3 days. Patient used Flonase.     Provider note begins below:   Patient has no fever or chills.  Denies sick exposure however she did travel to [facility] by plane over the holiday weekend.    Sinus Problem  This is a new problem. The current episode started in the past 7 days. The problem is unchanged. There has been no fever. Her pain is at a severity of 0/10. She is experiencing no pain. Associated symptoms include congestion and coughing. Pertinent negatives include no chills, diaphoresis, ear pain or sore throat. Past treatments include nasal decongestants. The treatment provided no relief.       Constitution: Negative. Negative for chills, sweating and fatigue.   HENT:  Positive for congestion and sinus pain. Negative for ear pain, facial swelling and sore throat.    Neck: Negative for painful lymph nodes.   Cardiovascular: Negative.  Negative for chest trauma, chest pain and sob on exertion.   Eyes: Negative.  Negative for eye itching and eye pain.   Respiratory:  Positive for cough. Negative for chest tightness and asthma.    Gastrointestinal: Negative.  Negative for nausea, vomiting and diarrhea.   Endocrine: negative. cold intolerance and excessive thirst.   Genitourinary: Negative.  Negative for dysuria, frequency, urgency and hematuria.   Musculoskeletal:  Negative for pain, trauma and joint pain.   Skin: Negative.  Negative for rash, wound and hives.   Allergic/Immunologic: Negative.  Negative for eczema, asthma, hives and itching.   Neurological: Negative.  Negative for disorientation and altered mental status. "   Hematologic/Lymphatic: Negative.  Negative for swollen lymph nodes.   Psychiatric/Behavioral: Negative.  Negative for altered mental status, disorientation and confusion.       Objective:     Physical Exam   Constitutional: She is oriented to person, place, and time. She appears well-developed. She is cooperative.  Non-toxic appearance. She does not appear ill. No distress.   HENT:   Head: Normocephalic and atraumatic.   Ears:   Right Ear: Hearing, tympanic membrane, external ear and ear canal normal. no impacted cerumen  Left Ear: Hearing, tympanic membrane, external ear and ear canal normal. no impacted cerumen  Nose: Mucosal edema and congestion present. No rhinorrhea or nasal deformity. No epistaxis. Right sinus exhibits no maxillary sinus tenderness and no frontal sinus tenderness. Left sinus exhibits no maxillary sinus tenderness and no frontal sinus tenderness.   Mouth/Throat: Uvula is midline, oropharynx is clear and moist and mucous membranes are normal. Mucous membranes are moist. No trismus in the jaw. Normal dentition. No uvula swelling. No oropharyngeal exudate, posterior oropharyngeal edema, posterior oropharyngeal erythema, tonsillar abscesses or cobblestoning. Tonsils are 0 on the right. Tonsils are 0 on the left. No tonsillar exudate. Oropharynx is clear.   Eyes: Conjunctivae and lids are normal. No scleral icterus.   Neck: Trachea normal and phonation normal. Neck supple. No edema present. No erythema present. No neck rigidity present.   Cardiovascular: Normal rate, regular rhythm, normal heart sounds and normal pulses.   Pulmonary/Chest: Effort normal and breath sounds normal. No stridor. No respiratory distress. She has no decreased breath sounds. She has no wheezes. She has no rhonchi. She has no rales. She exhibits no tenderness.   Abdominal: Normal appearance.   Musculoskeletal: Normal range of motion.         General: No deformity. Normal range of motion.   Lymphadenopathy:     She has no  cervical adenopathy.   Neurological: no focal deficit. She is alert, oriented to person, place, and time and at baseline. She exhibits normal muscle tone. Coordination normal.   Skin: Skin is warm, dry, intact, not diaphoretic and not pale.   Psychiatric: Her speech is normal and behavior is normal. Mood, judgment and thought content normal.   Nursing note and vitals reviewed.    Results for orders placed or performed in visit on 04/24/25   SARS Coronavirus 2 Antigen, POCT Manual Read    Collection Time: 04/24/25  5:21 PM   Result Value Ref Range    SARS Coronavirus 2 Antigen Negative Negative, Presumptive Negative     Acceptable Yes    POCT Influenza A/B MOLECULAR    Collection Time: 04/24/25  5:21 PM   Result Value Ref Range    POC Molecular Influenza A Ag Negative Negative    POC Molecular Influenza B Ag Negative Negative     Acceptable Yes    POCT urine pregnancy    Collection Time: 04/24/25  5:25 PM   Result Value Ref Range    POC Preg Test, Ur Negative Negative     Acceptable Yes          Assessment:     1. Viral URI with cough    2. Sinus congestion        Plan:   Discussed all negative testing done today with the patient.  Discussed with patient the symptoms are likely viral in nature.  Discussed supportive care.  Discussed negative side effects of steroids prior to prescribing.  Patient verbalized understanding.    FOLLOWUP  Follow up if symptoms worsen or fail to improve, for PLEASE CONTACT PCP OR CONTACT THE EMERGENCY ROOM..     PATIENT INSTRUCTIONS  Patient Instructions   - You received a steroid, Medrol Dosepak today.  This can elevate your blood pressure, elevate your blood sugar, water weight gain, nervous energy, redness to the face and dimpling of the skin where the shot goes in.   - Do not use steroids more than 3 times per year.   - If you have diabetes, please check you blood sugar frequently.  - If you have high blood pressure, please check your blood  pressure frequently.       INSTRUCTIONS:  - Rest.  - Drink plenty of fluids.  - Take Tylenol and/or Ibuprofen as directed as needed for fever/pain.  Do not take more than the recommended dose.  - follow up with your PCP within the next 1-2 weeks as needed.  - You must understand that you have received an Urgent Care treatment only and that you may be released before all of your medical problems are known or treated.   - You, the patient, will arrange for follow up care as instructed.   - If your condition worsens or fails to improve we recommend that you receive another evaluation at the ER immediately or contact your PCP to discuss your concerns.   - You can call [phone] or [phone] to help schedule an appointment with the appropriate provider.     -If you smoke cigarettes, it would be beneficial for you to stop.        Thank you for allowing me to participate in your health care,     FNP-C       Viral URI with cough  -     benzonatate (TESSALON) 200 MG capsule; Take 1 capsule (200 mg total) by mouth 3 (three) times daily as needed for Cough.  Dispense: 30 capsule; Refill: 0  -     fluticasone propionate (FLONASE) 50 mcg/actuation nasal spray; 1 spray (50 mcg total) by Each Nostril route daily as needed for Rhinitis.  Dispense: 16 g; Refill: 0  -     azelastine (ASTELIN) 137 mcg (0.1 %) nasal spray; 1 spray (137 mcg total) by Nasal route 2 (two) times daily as needed for Rhinitis.  Dispense: 30 mL; Refill: 0  -     methylPREDNISolone (MEDROL DOSEPACK) 4 mg tablet; use as directed  Dispense: 21 each; Refill: 0    Sinus congestion  -     SARS Coronavirus 2 Antigen, POCT Manual Read  -     POCT Influenza A/B MOLECULAR  -     POCT urine pregnancy  -     azelastine (ASTELIN) 137 mcg (0.1 %) nasal spray; 1 spray (137 mcg total) by Nasal route 2 (two) times daily as needed for Rhinitis.  Dispense: 30 mL; Refill: 0  -     methylPREDNISolone (MEDROL DOSEPACK) 4 mg tablet; use as directed  Dispense: 21 each;  Refill: 0

## (undated) DEVICE — KIT ANTIFOG

## (undated) DEVICE — APPLICATOR ARISTA FLEX XL

## (undated) DEVICE — SUT 4-0 CHROMIC GUT / RB1

## (undated) DEVICE — NDL HYPO REG 25G X 1 1/2

## (undated) DEVICE — SUT MCRYL PLUS 4-0 PS2 27IN

## (undated) DEVICE — SEE MEDLINE ITEM 157117

## (undated) DEVICE — SUT VLOC 2 0 BI 12 V12

## (undated) DEVICE — TOWEL OR DISP STRL BLUE 4/PK

## (undated) DEVICE — DRAPE ABDOMINAL TIBURON 14X11

## (undated) DEVICE — SEAL UNIVERSAL 5MM-8MM XI

## (undated) DEVICE — SEE MEDLINE ITEM 156913

## (undated) DEVICE — SPONGE GAUZE 16PLY 4X4

## (undated) DEVICE — DRAPE ARM DAVINCI XI

## (undated) DEVICE — POWDER ARISTA AH 3G

## (undated) DEVICE — ELECTRODE REM PLYHSV RETURN 9

## (undated) DEVICE — SOL ELECTROLUBE ANTI-STIC

## (undated) DEVICE — SUT PLAIN 4-0 SC-1 18IN

## (undated) DEVICE — SUT 0 VICRYL / UR6 (J603)

## (undated) DEVICE — BLADE INFERIOR TURBINATE 5/PK

## (undated) DEVICE — DRAPE SCOPE PILLOW WARMER

## (undated) DEVICE — SEE MEDLINE ITEM 146417

## (undated) DEVICE — DRAPE STERI INSTRUMENT 1018

## (undated) DEVICE — MARKER SKIN STND TIP BLUE BARR

## (undated) DEVICE — SYS CLSR DERMABOND PRINEO 22CM

## (undated) DEVICE — MESH VENTRALIGHT ST 4X6IN: Type: IMPLANTABLE DEVICE | Site: ABDOMEN | Status: NON-FUNCTIONAL

## (undated) DEVICE — COVER LIGHT HANDLE

## (undated) DEVICE — BLADE SURG CARBON STEEL SZ11

## (undated) DEVICE — Device

## (undated) DEVICE — TRAY MINOR GEN SURG

## (undated) DEVICE — SEE MEDLINE ITEM 152622

## (undated) DEVICE — DRESSING SURGICAL 1X3

## (undated) DEVICE — DRAPE COLUMN DAVINCI XI

## (undated) DEVICE — MUPIROCIN OINT 2% 22GM

## (undated) DEVICE — BLADE SCALP OPHTL RND TIP